# Patient Record
Sex: FEMALE | Race: BLACK OR AFRICAN AMERICAN | Employment: UNEMPLOYED | URBAN - METROPOLITAN AREA
[De-identification: names, ages, dates, MRNs, and addresses within clinical notes are randomized per-mention and may not be internally consistent; named-entity substitution may affect disease eponyms.]

---

## 2018-03-29 PROBLEM — Z82.49 FAMILY HISTORY OF ISCHEMIC HEART DISEASE (IHD): Status: ACTIVE | Noted: 2018-03-29

## 2018-03-29 PROBLEM — R07.2 PRECORDIAL PAIN: Status: ACTIVE | Noted: 2018-03-29

## 2018-03-29 PROBLEM — I10 ESSENTIAL HYPERTENSION: Status: ACTIVE | Noted: 2018-03-29

## 2018-05-17 ENCOUNTER — HOSPITAL ENCOUNTER (OUTPATIENT)
Dept: LAB | Age: 63
Discharge: HOME | End: 2018-05-17

## 2018-05-17 PROCEDURE — 88305 TISSUE EXAM BY PATHOLOGIST: CPT | Performed by: INTERNAL MEDICINE

## 2020-02-18 ENCOUNTER — ANESTHESIA EVENT (OUTPATIENT)
Dept: SURGERY | Age: 65
End: 2020-02-18
Payer: MEDICARE

## 2020-02-19 ENCOUNTER — APPOINTMENT (OUTPATIENT)
Dept: GENERAL RADIOLOGY | Age: 65
End: 2020-02-19
Attending: PODIATRIST
Payer: MEDICARE

## 2020-02-19 ENCOUNTER — ANESTHESIA (OUTPATIENT)
Dept: SURGERY | Age: 65
End: 2020-02-19
Payer: MEDICARE

## 2020-02-19 ENCOUNTER — HOSPITAL ENCOUNTER (OUTPATIENT)
Age: 65
Setting detail: OUTPATIENT SURGERY
Discharge: HOME | End: 2020-02-19
Attending: PODIATRIST | Admitting: PODIATRIST
Payer: MEDICARE

## 2020-02-19 VITALS
DIASTOLIC BLOOD PRESSURE: 88 MMHG | WEIGHT: 208 LBS | BODY MASS INDEX: 30.72 KG/M2 | HEART RATE: 66 BPM | RESPIRATION RATE: 17 BRPM | SYSTOLIC BLOOD PRESSURE: 180 MMHG | OXYGEN SATURATION: 98 % | TEMPERATURE: 97.6 F

## 2020-02-19 DIAGNOSIS — G89.18 POST-OPERATIVE PAIN: Primary | ICD-10-CM

## 2020-02-19 LAB
ALBUMIN SERPL-MCNC: 3.6 G/DL (ref 3.2–4.6)
ALBUMIN/GLOB SERPL: 1 {RATIO} (ref 1.2–3.5)
ALP SERPL-CCNC: 73 U/L (ref 50–136)
ALT SERPL-CCNC: 17 U/L (ref 12–65)
ANION GAP SERPL CALC-SCNC: 8 MMOL/L (ref 7–16)
AST SERPL-CCNC: 15 U/L (ref 15–37)
BILIRUB SERPL-MCNC: 0.3 MG/DL (ref 0.2–1.1)
BUN SERPL-MCNC: 21 MG/DL (ref 8–23)
CALCIUM SERPL-MCNC: 9.2 MG/DL (ref 8.3–10.4)
CHLORIDE SERPL-SCNC: 107 MMOL/L (ref 98–107)
CO2 SERPL-SCNC: 26 MMOL/L (ref 21–32)
CREAT SERPL-MCNC: 0.83 MG/DL (ref 0.6–1)
ERYTHROCYTE [DISTWIDTH] IN BLOOD BY AUTOMATED COUNT: 13.2 % (ref 11.9–14.6)
GLOBULIN SER CALC-MCNC: 3.6 G/DL (ref 2.3–3.5)
GLUCOSE SERPL-MCNC: 98 MG/DL (ref 65–100)
HCT VFR BLD AUTO: 35.1 % (ref 35.8–46.3)
HGB BLD-MCNC: 10.9 G/DL (ref 11.7–15.4)
MCH RBC QN AUTO: 27.2 PG (ref 26.1–32.9)
MCHC RBC AUTO-ENTMCNC: 31.1 G/DL (ref 31.4–35)
MCV RBC AUTO: 87.5 FL (ref 79.6–97.8)
NRBC # BLD: 0 K/UL (ref 0–0.2)
PLATELET # BLD AUTO: 223 K/UL (ref 150–450)
PMV BLD AUTO: 10.4 FL (ref 9.4–12.3)
POTASSIUM SERPL-SCNC: 3.7 MMOL/L (ref 3.5–5.1)
PROT SERPL-MCNC: 7.2 G/DL (ref 6.3–8.2)
RBC # BLD AUTO: 4.01 M/UL (ref 4.05–5.2)
SODIUM SERPL-SCNC: 141 MMOL/L (ref 136–145)
WBC # BLD AUTO: 8.2 K/UL (ref 4.3–11.1)

## 2020-02-19 PROCEDURE — 77030033138 HC SUT PGA STRATFX J&J -B: Performed by: PODIATRIST

## 2020-02-19 PROCEDURE — 77030006788 HC BLD SAW OSC STRY -B: Performed by: PODIATRIST

## 2020-02-19 PROCEDURE — 76010000138 HC OR TIME 0.5 TO 1 HR: Performed by: PODIATRIST

## 2020-02-19 PROCEDURE — C1776 JOINT DEVICE (IMPLANTABLE): HCPCS | Performed by: PODIATRIST

## 2020-02-19 PROCEDURE — 80053 COMPREHEN METABOLIC PANEL: CPT

## 2020-02-19 PROCEDURE — 77030032759 HC BIT DRL RMR DISP STRY -D: Performed by: PODIATRIST

## 2020-02-19 PROCEDURE — 74011000250 HC RX REV CODE- 250: Performed by: PODIATRIST

## 2020-02-19 PROCEDURE — 85027 COMPLETE CBC AUTOMATED: CPT

## 2020-02-19 PROCEDURE — 74011000250 HC RX REV CODE- 250: Performed by: NURSE ANESTHETIST, CERTIFIED REGISTERED

## 2020-02-19 PROCEDURE — 77030031139 HC SUT VCRL2 J&J -A: Performed by: PODIATRIST

## 2020-02-19 PROCEDURE — 77030018836 HC SOL IRR NACL ICUM -A: Performed by: PODIATRIST

## 2020-02-19 PROCEDURE — 76210000006 HC OR PH I REC 0.5 TO 1 HR: Performed by: PODIATRIST

## 2020-02-19 PROCEDURE — 74011250636 HC RX REV CODE- 250/636: Performed by: NURSE ANESTHETIST, CERTIFIED REGISTERED

## 2020-02-19 PROCEDURE — 74011250636 HC RX REV CODE- 250/636: Performed by: PODIATRIST

## 2020-02-19 PROCEDURE — 74011250636 HC RX REV CODE- 250/636: Performed by: ANESTHESIOLOGY

## 2020-02-19 PROCEDURE — 76060000033 HC ANESTHESIA 1 TO 1.5 HR: Performed by: PODIATRIST

## 2020-02-19 PROCEDURE — 76210000020 HC REC RM PH II FIRST 0.5 HR: Performed by: PODIATRIST

## 2020-02-19 DEVICE — INTRAMEDULLARY ARTHRODESIS IMPLANT
Type: IMPLANTABLE DEVICE | Site: TOE | Status: FUNCTIONAL
Brand: SMART TOE

## 2020-02-19 RX ORDER — MIDAZOLAM HYDROCHLORIDE 1 MG/ML
2 INJECTION, SOLUTION INTRAMUSCULAR; INTRAVENOUS
Status: DISCONTINUED | OUTPATIENT
Start: 2020-02-19 | End: 2020-02-19 | Stop reason: HOSPADM

## 2020-02-19 RX ORDER — LIDOCAINE HYDROCHLORIDE 10 MG/ML
INJECTION INFILTRATION; PERINEURAL AS NEEDED
Status: DISCONTINUED | OUTPATIENT
Start: 2020-02-19 | End: 2020-02-19 | Stop reason: HOSPADM

## 2020-02-19 RX ORDER — NALOXONE HYDROCHLORIDE 0.4 MG/ML
0.04 INJECTION, SOLUTION INTRAMUSCULAR; INTRAVENOUS; SUBCUTANEOUS
Status: DISCONTINUED | OUTPATIENT
Start: 2020-02-19 | End: 2020-02-19 | Stop reason: HOSPADM

## 2020-02-19 RX ORDER — PROPOFOL 10 MG/ML
INJECTION, EMULSION INTRAVENOUS
Status: DISCONTINUED | OUTPATIENT
Start: 2020-02-19 | End: 2020-02-19 | Stop reason: HOSPADM

## 2020-02-19 RX ORDER — LIDOCAINE HYDROCHLORIDE 10 MG/ML
0.1 INJECTION INFILTRATION; PERINEURAL AS NEEDED
Status: DISCONTINUED | OUTPATIENT
Start: 2020-02-19 | End: 2020-02-19 | Stop reason: HOSPADM

## 2020-02-19 RX ORDER — MIDAZOLAM HYDROCHLORIDE 1 MG/ML
2 INJECTION, SOLUTION INTRAMUSCULAR; INTRAVENOUS ONCE
Status: DISCONTINUED | OUTPATIENT
Start: 2020-02-19 | End: 2020-02-19 | Stop reason: HOSPADM

## 2020-02-19 RX ORDER — ACETAMINOPHEN AND CODEINE PHOSPHATE 300; 30 MG/1; MG/1
1 TABLET ORAL
Qty: 28 TAB | Refills: 0 | Status: SHIPPED | OUTPATIENT
Start: 2020-02-19 | End: 2020-02-22

## 2020-02-19 RX ORDER — OXYCODONE HYDROCHLORIDE 5 MG/1
5 TABLET ORAL
Status: DISCONTINUED | OUTPATIENT
Start: 2020-02-19 | End: 2020-02-19 | Stop reason: HOSPADM

## 2020-02-19 RX ORDER — FENTANYL CITRATE 50 UG/ML
100 INJECTION, SOLUTION INTRAMUSCULAR; INTRAVENOUS ONCE
Status: DISCONTINUED | OUTPATIENT
Start: 2020-02-19 | End: 2020-02-19 | Stop reason: HOSPADM

## 2020-02-19 RX ORDER — CEFAZOLIN SODIUM/WATER 2 G/20 ML
2 SYRINGE (ML) INTRAVENOUS ONCE
Status: COMPLETED | OUTPATIENT
Start: 2020-02-19 | End: 2020-02-19

## 2020-02-19 RX ORDER — SODIUM CHLORIDE, SODIUM LACTATE, POTASSIUM CHLORIDE, CALCIUM CHLORIDE 600; 310; 30; 20 MG/100ML; MG/100ML; MG/100ML; MG/100ML
100 INJECTION, SOLUTION INTRAVENOUS CONTINUOUS
Status: DISCONTINUED | OUTPATIENT
Start: 2020-02-19 | End: 2020-02-19 | Stop reason: HOSPADM

## 2020-02-19 RX ORDER — SODIUM CHLORIDE 0.9 % (FLUSH) 0.9 %
5-40 SYRINGE (ML) INJECTION AS NEEDED
Status: DISCONTINUED | OUTPATIENT
Start: 2020-02-19 | End: 2020-02-19 | Stop reason: HOSPADM

## 2020-02-19 RX ORDER — DEXAMETHASONE SODIUM PHOSPHATE 4 MG/ML
INJECTION, SOLUTION INTRA-ARTICULAR; INTRALESIONAL; INTRAMUSCULAR; INTRAVENOUS; SOFT TISSUE AS NEEDED
Status: DISCONTINUED | OUTPATIENT
Start: 2020-02-19 | End: 2020-02-19 | Stop reason: HOSPADM

## 2020-02-19 RX ORDER — HYDROMORPHONE HYDROCHLORIDE 2 MG/ML
0.5 INJECTION, SOLUTION INTRAMUSCULAR; INTRAVENOUS; SUBCUTANEOUS
Status: DISCONTINUED | OUTPATIENT
Start: 2020-02-19 | End: 2020-02-19 | Stop reason: HOSPADM

## 2020-02-19 RX ORDER — BUPIVACAINE HYDROCHLORIDE 5 MG/ML
INJECTION, SOLUTION EPIDURAL; INTRACAUDAL AS NEEDED
Status: DISCONTINUED | OUTPATIENT
Start: 2020-02-19 | End: 2020-02-19 | Stop reason: HOSPADM

## 2020-02-19 RX ORDER — SODIUM CHLORIDE 0.9 % (FLUSH) 0.9 %
5-40 SYRINGE (ML) INJECTION EVERY 8 HOURS
Status: DISCONTINUED | OUTPATIENT
Start: 2020-02-19 | End: 2020-02-19 | Stop reason: HOSPADM

## 2020-02-19 RX ORDER — CEPHALEXIN 500 MG/1
500 CAPSULE ORAL 2 TIMES DAILY
Qty: 20 CAP | Refills: 0 | Status: SHIPPED | OUTPATIENT
Start: 2020-02-19 | End: 2022-01-24

## 2020-02-19 RX ORDER — LIDOCAINE HYDROCHLORIDE 20 MG/ML
INJECTION, SOLUTION EPIDURAL; INFILTRATION; INTRACAUDAL; PERINEURAL AS NEEDED
Status: DISCONTINUED | OUTPATIENT
Start: 2020-02-19 | End: 2020-02-19 | Stop reason: HOSPADM

## 2020-02-19 RX ORDER — PROPOFOL 10 MG/ML
INJECTION, EMULSION INTRAVENOUS AS NEEDED
Status: DISCONTINUED | OUTPATIENT
Start: 2020-02-19 | End: 2020-02-19 | Stop reason: HOSPADM

## 2020-02-19 RX ADMIN — SODIUM CHLORIDE, SODIUM LACTATE, POTASSIUM CHLORIDE, AND CALCIUM CHLORIDE 100 ML/HR: 600; 310; 30; 20 INJECTION, SOLUTION INTRAVENOUS at 06:24

## 2020-02-19 RX ADMIN — PROPOFOL 40 MG: 10 INJECTION, EMULSION INTRAVENOUS at 07:13

## 2020-02-19 RX ADMIN — PROPOFOL 30 MG: 10 INJECTION, EMULSION INTRAVENOUS at 07:16

## 2020-02-19 RX ADMIN — PROPOFOL 200 MCG/KG/MIN: 10 INJECTION, EMULSION INTRAVENOUS at 07:13

## 2020-02-19 RX ADMIN — Medication 2 G: at 07:12

## 2020-02-19 RX ADMIN — LIDOCAINE HYDROCHLORIDE 60 MG: 20 INJECTION, SOLUTION EPIDURAL; INFILTRATION; INTRACAUDAL; PERINEURAL at 07:12

## 2020-02-19 NOTE — ANESTHESIA POSTPROCEDURE EVALUATION
Procedure(s):  LEFT 3RD TOE CORRECTION OF DEFORMED TOE . total IV anesthesia    Anesthesia Post Evaluation        Patient location during evaluation: PACU  Patient participation: complete - patient participated  Level of consciousness: awake  Pain management: satisfactory to patient  Airway patency: patent  Anesthetic complications: no  Cardiovascular status: hemodynamically stable  Respiratory status: spontaneous ventilation  Hydration status: euvolemic  Post anesthesia nausea and vomiting:  none      Vitals Value Taken Time   /81 2/19/2020  8:22 AM   Temp 36.4 °C (97.6 °F) 2/19/2020  8:09 AM   Pulse 65 2/19/2020  8:24 AM   Resp 15 2/19/2020  8:12 AM   SpO2 100 % 2/19/2020  8:24 AM   Vitals shown include unvalidated device data.

## 2020-02-19 NOTE — H&P
Patient: Yuliya Murray MRN: [id number]  SSN: [id number]    YOB: 1955  Age: 59 y.o. Sex: female      History and Physical    Yuliya Murray is a 59 y.o. female having Procedure(s):  LEFT 3RD TOE CORRECTION OF DEFORMED TOE LOCAL W/ MAC. Allergies: Allergies   Allergen Reactions    Latex Rash    Nexium [Esomeprazole Magnesium] Rash    Other Medication Rash     Silk tape    Seafood [Shellfish Containing Products] Rash        Chief Complaint: Left foot pain. History of Present Illness: One year history of left foot pain, difficulty walking and wearign shoes due to hammertoe. Past Medical History:   Diagnosis Date    Arthritis     pinched nerve in neck    GERD (gastroesophageal reflux disease)     well controlled with omeprazole daily--    Hypertension     Iron deficiency anemia     takes oral Fe- denies hx of blood transfusion      Past Surgical History:   Procedure Laterality Date    HX COLONOSCOPY      HX HYSTERECTOMY  1995    HX TONSILLECTOMY        Family History   Problem Relation Age of Onset    Coronary Artery Disease Mother     Arrhythmia Mother    Lucila Patel Mother     Other Mother 80        Gastroparesis    Stroke Mother         TIA    Diabetes Father     Kidney Disease Father     Breast Cancer Sister     Hypertension Sister     Drug Abuse Brother     Hypertension Brother       Social History     Tobacco Use    Smoking status: Never Smoker    Smokeless tobacco: Never Used   Substance Use Topics    Alcohol use: No        Prior to Admission medications    Medication Sig Start Date End Date Taking? Authorizing Provider   fluticasone propionate (FLONASE NA) by Nasal route daily as needed. Yes Provider, Historical   meloxicam (MOBIC) 15 mg tablet Take 15 mg by mouth daily.  2/18/2020 Instructed to hold for surgery as of now 12/2/19  Yes Provider, Historical   cyclobenzaprine (FLEXERIL) 10 mg tablet Take  by mouth three (3) times daily as needed for Muscle Spasm(s). Yes Provider, Historical   meclizine (ANTIVERT) 25 mg tablet Take 25 mg by mouth three (3) times daily as needed. Yes Provider, Historical   omeprazole (PRILOSEC) 40 mg capsule Take 40 mg by mouth daily. Yes Provider, Historical   multivitamin (ONE A DAY) tablet Take 1 Tab by mouth daily. Yes Provider, Historical   lisinopril-hydrochlorothiazide (ZESTORETIC) 20-12.5 mg per tablet Take 1 Tab by mouth daily. Yes Other, MD Aden   Calcium Carbonate-Vit D3-Min (CALTRATE 600+D PLUS MINERALS) 600-400 mg-unit Tab Take  by mouth. Yes Other, MD Aden   ferrous sulfate (IRON, FERROUS SULFATE,) 325 mg (65 mg Iron) tablet Take  by mouth daily (before breakfast). Yes Other, MD Aden   psyllium (METAMUCIL SMOOTH TEXTURE) 28 % packet Take  by mouth two (2) times daily as needed. Yes Other, MD Aden        Visit Vitals  Resp 16   Wt 94.3 kg (208 lb)   BMI 30.72 kg/m²        Assessment and Plan:   Mauricio Kovacs is a 59 y.o. female having Procedure(s):  LEFT 3RD TOE CORRECTION OF DEFORMED TOE LOCAL W/ MAC for left foot pain. Preanesthesia Evaluation     Last edited 02/19/20 0641 by Meg Bass MD  Date of Service 02/19/20 2573             Relevant Problems   No relevant active problems       Anesthetic History   No history of anesthetic complications            Review of Systems / Medical History  Pertinent labs reviewed    Pulmonary  Within defined limits                 Neuro/Psych   Within defined limits           Cardiovascular    Hypertension              Exercise tolerance: >4 METS  Comments: Normal cardiac w/u with echo and stress test 2018.    GI/Hepatic/Renal     GERD: well controlled           Endo/Other        Arthritis and anemia     Other Findings              Physical Exam    Airway  Mallampati: II  TM Distance: 4 - 6 cm  Neck ROM: normal range of motion   Mouth opening: Normal     Cardiovascular  Regular rate and rhythm,  S1 and S2 normal,  no murmur, click, rub, or gallop             Dental    Dentition: Upper partial plate and Lower partial plate     Pulmonary  Breath sounds clear to auscultation               Abdominal  GI exam deferred       Other Findings            Anesthetic Plan    ASA: 2  Anesthesia type: total IV anesthesia          Induction: Intravenous  Anesthetic plan and risks discussed with: Patient and Spouse               Preanesthesia evaluation performed by Liliane Triplett MD    Revision History       Date/Time User Provider Type Action    > 02/19/20 0641 Liliane Triplett MD Anesthesiologist Addend     02/19/20 8274 Liliane Triplett MD Anesthesiologist Sign                  Signed By: Marleny Sung MD     February 19, 2020

## 2020-02-19 NOTE — ANESTHESIA PREPROCEDURE EVALUATION
Relevant Problems   No relevant active problems       Anesthetic History   No history of anesthetic complications            Review of Systems / Medical History  Pertinent labs reviewed    Pulmonary  Within defined limits                 Neuro/Psych   Within defined limits           Cardiovascular    Hypertension              Exercise tolerance: >4 METS  Comments: Normal cardiac w/u with echo and stress test 2018.    GI/Hepatic/Renal     GERD: well controlled           Endo/Other        Arthritis and anemia     Other Findings              Physical Exam    Airway  Mallampati: II  TM Distance: 4 - 6 cm  Neck ROM: normal range of motion   Mouth opening: Normal     Cardiovascular  Regular rate and rhythm,  S1 and S2 normal,  no murmur, click, rub, or gallop             Dental    Dentition: Upper partial plate and Lower partial plate     Pulmonary  Breath sounds clear to auscultation               Abdominal  GI exam deferred       Other Findings            Anesthetic Plan    ASA: 2  Anesthesia type: total IV anesthesia          Induction: Intravenous  Anesthetic plan and risks discussed with: Patient and Spouse

## 2020-02-19 NOTE — PROGRESS NOTES
Pt instructed to take her BP medication that she held prior to surgery when she arrives home. Pt verbalized understanding.

## 2020-02-19 NOTE — BRIEF OP NOTE
BRIEF OPERATIVE NOTE    Date of Procedure: 2/19/2020   Preoperative Diagnosis: Hammer toe of left foot [M20.42]  Postoperative Diagnosis: Hammer toe of left foot [M20.42]    Procedure(s):  LEFT 3RD TOE CORRECTION OF DEFORMED TOE   Surgeon(s) and Role:     * Korin Hernandez DPM - Primary         Surgical Assistant: none    Surgical Staff:  Circ-1: Jose Alberto Calhoun RN  Scrub Tech-1: Miguel Huang  Event Time In Time Out   Incision Start 0217    Incision Close 0758      Anesthesia: MAC   Estimated Blood Loss: minimal  Specimens: * No specimens in log *   Findings: dictated  Complications: none  Implants:   Implant Name Type Inv.  Item Serial No.  Lot No. LRB No. Used Action   TOE IMPL II 15MM --  - [id number]  TOE IMPL II 15MM --   [employer] HOW [id number] Left 1 Implanted

## 2020-02-19 NOTE — DISCHARGE INSTRUCTIONS
[facility]    Post operative instructions and recommendations for your personal comfort following foot surgery:    1. Upon arrival home, lie down and elevate your feet and legs approximately 16 on pillows. Also, support your knees with pillows. 2. Post-operative swelling is greatly reduced by the use of ice packs. Ice packs should be applied over the top or bottom of the bandage every twenty minutes on the hour until returning to the office. 3. Take medication prescribed by the doctor according to directions. Avoid taking medication on an empty stomach. 4. Remain quiet and off of your feet as much as possible for the first 48 hours. After this period use discretion and common sense regarding the amount of standing or walking you do. Generally, post-operative patients should stay off their feet for 24-48hours. After this period walking to tolerance is usually allowed (unless otherwise specified by your doctor). 5. Do not be alarmed if there is some slight bleeding on the bandages; good blood supply is essential for normal tissue healing. 6. Keep feet elevated as much as possible for the first three days. Generally above the chest is most desirable. 7. Keep bandages completely dry. 8. Do not remove the surgical bandages. 9. The successful result of your surgery depends on the careful following of these instructions. 10. Please refrain from all alcoholic beverages. 11. If there are any questions or problems, please feel free to phone the doctor at the office. Your follow-up appointment has been scheduled for **, 2009 at the [place] office. Nicho Germain, [facility] [address] [place] [address] [phone]  [facility] [phone]    Dr. Orlin Martin  Cell [facility]--[phone]    ACTIVITY  · As tolerated and as directed by your doctor. · Bathe or shower as directed by your doctor.      DIET  · Clear liquids until no nausea or vomiting; then light diet for the first day. · Advance to regular diet on second day, unless your doctor orders otherwise. · If nausea and vomiting continues, call your doctor. PAIN  · Take pain medication as directed by your doctor. · Call your doctor if pain is NOT relieved by medication. · DO NOT take aspirin of blood thinners unless directed by your doctor. DRESSING CARE       CALL YOUR DOCTOR IF   · Excessive bleeding that does not stop after holding pressure over the area  · Temperature of 101 degrees F or above  · Excessive redness, swelling or bruising, and/ or green or yellow, smelly discharge from incision    AFTER ANESTHESIA   · For the first 24 hours: DO NOT Drive, Drink alcoholic beverages, or Make important decisions. · Be aware of dizziness following anesthesia and while taking pain medication. APPOINTMENT DATE/ TIME    YOUR DOCTOR'S PHONE NUMBER       DISCHARGE SUMMARY from Nurse    PATIENT INSTRUCTIONS:    After general anesthesia or intravenous sedation, for 24 hours or while taking prescription Narcotics:  · Limit your activities  · Do not drive and operate hazardous machinery  · Do not make important personal or business decisions  · Do  not drink alcoholic beverages  · If you have not urinated within 8 hours after discharge, please contact your surgeon on call. *  Please give a list of your current medications to your Primary Care Provider. *  Please update this list whenever your medications are discontinued, doses are      changed, or new medications (including over-the-counter products) are added. *  Please carry medication information at all times in case of emergency situations. These are general instructions for a healthy lifestyle:    No smoking/ No tobacco products/ Avoid exposure to second hand smoke    Surgeon General's Warning:  Quitting smoking now greatly reduces serious risk to your health.     Obesity, smoking, and sedentary lifestyle greatly increases your risk for illness    A healthy diet, regular physical exercise & weight monitoring are important for maintaining a healthy lifestyle    You may be retaining fluid if you have a history of heart failure or if you experience any of the following symptoms:  Weight gain of 3 pounds or more overnight or 5 pounds in a week, increased swelling in our hands or feet or shortness of breath while lying flat in bed. Please call your doctor as soon as you notice any of these symptoms; do not wait until your next office visit. Recognize signs and symptoms of STROKE:    F-face looks uneven    A-arms unable to move or move unevenly    S-speech slurred or non-existent    T-time-call 911 as soon as signs and symptoms begin-DO NOT go       Back to bed or wait to see if you get better-TIME IS BRAIN.

## 2020-02-20 NOTE — OP NOTES
[facility]  OPERATIVE REPORT    Name:  Maritza Mena  MR#:  [id number]  :  1955  ACCOUNT #:  [de-identified]  DATE OF SERVICE:  2020    PREOPERATIVE DIAGNOSIS:  Deformed third digit of the left foot. POSTOPERATIVE DIAGNOSIS:  Deformed third digit of the left foot. PROCEDURE PERFORMED:  Correction of the deformed third digit with arthrodesis. SURGEON:  Kary Banda DPM    ASSISTANT:  none    ANESTHESIA:  IV sedation with local infiltration. COMPLICATIONS:  none    SPECIMENS REMOVED:  none    IMPLANTS:  dictated    ESTIMATED BLOOD LOSS:  minimal    PROCEDURE IN DETAIL:  The patient was brought to the operating room and placed on operating table in supine position. After a brief general anesthesia, local anesthesia was achieved utilizing 1:1 mixture of 1% Xylocaine plain and 0.5% Marcaine plain, a total of 6 mL. At this time, the left foot was prepped and draped in the usual standard fashion. The foot was exsanguinated and tourniquet was elevated at the ankle level at 250 mmHg. Attention was then directed to the third digit, which was noted to have a significant deformity at the PIPJ. A dorsolinear incision of approximately 2-3 cm was placed centered at the PIPJ. The incision was carried down to the deeper layer via sharp and dull dissection taking care of neurovascular structures as encountered. At this time, a transverse capsulotomy was performed and the head of the proximal phalanx and base of the intermediate phalanx were exposed into the surgical site. Utilizing a rongeur, the entire cartilaginous surfaces were denuded. At this time, the both proximal phalanx and the intermediate phalanx were prepared for the size 15-mm Smart Toe Implant. The surgical site was irrigated. The implant was implanted. It should be noted that it was a straight. The intraoperative radiograph showed satisfactory alignment and satisfactory approximation.   At this time, the surgical site was once again irrigated. Deep closure was achieved utilizing 3-0 and 4-0 Vicryl and the skin was closed utilizing 4-0 Monocryl. At this time, the surgical area, just proximal to it, in the MPJ area, approximately 3 mL of 0.5% Marcaine mixed with 2 mg of dexamethasone was injected for prolonged anesthesia and to keep the inflammation at minimum. The wound was dressed with a Steri-Strip, Betadine-soaked Adaptic, plain 4x4s, Kerlix and Coban. As soon as the tourniquet was released, instant warming and pinking of the digits were noted indicating good return of the vascular supply. The patient tolerated the anesthesia and procedure well and left the operating room in apparent satisfactory condition. Postoperatively, she has been given written postoperative instructions to follow. She has been provided with numbers to call if any questions or concerns arise. I have given her prescriptions for Keflex 500 mg, #20 one p.o. b.i.d. and Tylenol 3, #28 one every six hours as needed for foot pain. She is to come and see me in approximately 5-7 days.       AJITH [id number]  D:  02/19/2020 8:08  T:  02/19/2020 19:37  JOB #:  [id number]

## 2022-01-24 ENCOUNTER — APPOINTMENT (OUTPATIENT)
Dept: CT IMAGING | Age: 67
End: 2022-01-24
Attending: EMERGENCY MEDICINE
Payer: MEDICARE

## 2022-01-24 ENCOUNTER — HOSPITAL ENCOUNTER (EMERGENCY)
Age: 67
Discharge: HOME | End: 2022-01-24
Attending: EMERGENCY MEDICINE
Payer: MEDICARE

## 2022-01-24 VITALS
WEIGHT: 207.89 LBS | DIASTOLIC BLOOD PRESSURE: 76 MMHG | TEMPERATURE: 98.5 F | SYSTOLIC BLOOD PRESSURE: 113 MMHG | RESPIRATION RATE: 20 BRPM | OXYGEN SATURATION: 97 % | HEART RATE: 74 BPM | BODY MASS INDEX: 30.79 KG/M2 | HEIGHT: 69 IN

## 2022-01-24 DIAGNOSIS — T14.8XXA MUSCLE STRAIN: Primary | ICD-10-CM

## 2022-01-24 PROCEDURE — 99283 EMERGENCY DEPT VISIT LOW MDM: CPT

## 2022-01-24 PROCEDURE — 70450 CT HEAD/BRAIN W/O DYE: CPT

## 2022-01-24 PROCEDURE — 72125 CT NECK SPINE W/O DYE: CPT

## 2022-01-24 RX ORDER — METHOCARBAMOL 750 MG/1
750 TABLET, FILM COATED ORAL 3 TIMES DAILY
Qty: 18 TABLET | Refills: 0 | Status: SHIPPED | OUTPATIENT
Start: 2022-01-24 | End: 2022-04-18 | Stop reason: SDUPTHER
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
## 2022-03-19 PROBLEM — R07.2 PRECORDIAL PAIN: Status: ACTIVE | Noted: 2018-03-29

## 2022-03-19 PROBLEM — I10 ESSENTIAL HYPERTENSION: Status: ACTIVE | Noted: 2018-03-29

## 2022-03-20 PROBLEM — Z82.49 FAMILY HISTORY OF ISCHEMIC HEART DISEASE (IHD): Status: ACTIVE | Noted: 2018-03-29

## 2022-04-04 ENCOUNTER — HOSPITAL ENCOUNTER (OUTPATIENT)
Dept: SURGERY | Age: 67
Discharge: HOME | End: 2022-04-04
Payer: MEDICARE

## 2022-04-04 ENCOUNTER — HOSPITAL ENCOUNTER (OUTPATIENT)
Dept: REHABILITATION | Age: 67
Discharge: HOME | End: 2022-04-04
Payer: MEDICARE

## 2022-04-04 VITALS
BODY MASS INDEX: 32.02 KG/M2 | DIASTOLIC BLOOD PRESSURE: 78 MMHG | HEIGHT: 67 IN | WEIGHT: 204 LBS | TEMPERATURE: 98 F | RESPIRATION RATE: 18 BRPM | SYSTOLIC BLOOD PRESSURE: 146 MMHG | HEART RATE: 59 BPM | OXYGEN SATURATION: 99 %

## 2022-04-04 DIAGNOSIS — R06.83 SNORING: Primary | ICD-10-CM

## 2022-04-04 LAB
ANION GAP SERPL CALC-SCNC: 5 MMOL/L (ref 7–16)
APTT PPP: 29.2 SEC (ref 24.1–35.1)
ATRIAL RATE: 59 BPM
BACTERIA SPEC CULT: NORMAL
BASOPHILS # BLD: 0 K/UL (ref 0–0.2)
BASOPHILS NFR BLD: 1 % (ref 0–2)
BUN SERPL-MCNC: 13 MG/DL (ref 8–23)
CALCIUM SERPL-MCNC: 9.3 MG/DL (ref 8.3–10.4)
CALCULATED P AXIS, ECG09: 42 DEGREES
CALCULATED R AXIS, ECG10: 57 DEGREES
CALCULATED T AXIS, ECG11: 41 DEGREES
CHLORIDE SERPL-SCNC: 105 MMOL/L (ref 98–107)
CO2 SERPL-SCNC: 28 MMOL/L (ref 21–32)
CREAT SERPL-MCNC: 0.9 MG/DL (ref 0.6–1)
DIAGNOSIS, 93000: NORMAL
DIFFERENTIAL METHOD BLD: ABNORMAL
EOSINOPHIL # BLD: 0.2 K/UL (ref 0–0.8)
EOSINOPHIL NFR BLD: 3 % (ref 0.5–7.8)
ERYTHROCYTE [DISTWIDTH] IN BLOOD BY AUTOMATED COUNT: 13.5 % (ref 11.9–14.6)
EST. AVERAGE GLUCOSE BLD GHB EST-MCNC: 105 MG/DL
GLUCOSE SERPL-MCNC: 92 MG/DL (ref 65–100)
HBA1C MFR BLD: 5.3 % (ref 4.2–6.3)
HCT VFR BLD AUTO: 36.9 % (ref 35.8–46.3)
HGB BLD-MCNC: 11.4 G/DL (ref 11.7–15.4)
IMM GRANULOCYTES # BLD AUTO: 0 K/UL (ref 0–0.5)
IMM GRANULOCYTES NFR BLD AUTO: 0 % (ref 0–5)
INR PPP: 1.1
LYMPHOCYTES # BLD: 2 K/UL (ref 0.5–4.6)
LYMPHOCYTES NFR BLD: 35 % (ref 13–44)
MCH RBC QN AUTO: 27.2 PG (ref 26.1–32.9)
MCHC RBC AUTO-ENTMCNC: 30.9 G/DL (ref 31.4–35)
MCV RBC AUTO: 88.1 FL (ref 79.6–97.8)
MONOCYTES # BLD: 0.4 K/UL (ref 0.1–1.3)
MONOCYTES NFR BLD: 8 % (ref 4–12)
NEUTS SEG # BLD: 3.1 K/UL (ref 1.7–8.2)
NEUTS SEG NFR BLD: 53 % (ref 43–78)
NRBC # BLD: 0 K/UL (ref 0–0.2)
P-R INTERVAL, ECG05: 170 MS
PLATELET # BLD AUTO: 229 K/UL (ref 150–450)
PMV BLD AUTO: 10.9 FL (ref 9.4–12.3)
POTASSIUM SERPL-SCNC: 3.4 MMOL/L (ref 3.5–5.1)
PROTHROMBIN TIME: 14.4 SEC (ref 12.6–14.5)
Q-T INTERVAL, ECG07: 408 MS
QRS DURATION, ECG06: 80 MS
QTC CALCULATION (BEZET), ECG08: 403 MS
RBC # BLD AUTO: 4.19 M/UL (ref 4.05–5.2)
SERVICE CMNT-IMP: NORMAL
SODIUM SERPL-SCNC: 138 MMOL/L (ref 136–145)
VENTRICULAR RATE, ECG03: 59 BPM
WBC # BLD AUTO: 5.7 K/UL (ref 4.3–11.1)

## 2022-04-04 PROCEDURE — 85730 THROMBOPLASTIN TIME PARTIAL: CPT

## 2022-04-04 PROCEDURE — 80048 BASIC METABOLIC PNL TOTAL CA: CPT

## 2022-04-04 PROCEDURE — 83036 HEMOGLOBIN GLYCOSYLATED A1C: CPT

## 2022-04-04 PROCEDURE — 94760 N-INVAS EAR/PLS OXIMETRY 1: CPT

## 2022-04-04 PROCEDURE — 85610 PROTHROMBIN TIME: CPT

## 2022-04-04 PROCEDURE — 77030027138 HC INCENT SPIROMETER -A

## 2022-04-04 PROCEDURE — 97161 PT EVAL LOW COMPLEX 20 MIN: CPT

## 2022-04-04 PROCEDURE — 85025 COMPLETE CBC W/AUTO DIFF WBC: CPT

## 2022-04-04 PROCEDURE — 87641 MR-STAPH DNA AMP PROBE: CPT

## 2022-04-04 PROCEDURE — 93005 ELECTROCARDIOGRAM TRACING: CPT

## 2022-04-04 RX ORDER — ASPIRIN 325 MG
325 TABLET ORAL DAILY
COMMUNITY
End: 2022-04-14

## 2022-04-04 RX ORDER — MAG/ALUMINUM/SOD BICARB/ALGINC 14.2-80MG
1 TABLET,CHEWABLE ORAL
COMMUNITY

## 2022-04-04 NOTE — PROGRESS NOTES
Roberto Barney  : [birth year](72 y.o.) [facility]  [address], [place]  [phone]       Physical Therapy Prehab Plan of Treatment and Evaluation Summary:2022    ICD-10: Treatment Diagnosis:   · Pain in Right Knee (M25.561)  · Stiffness of Right Knee, Not elsewhere classified (M25.661)  · Difficulty in walking, Not elsewhere classified (R26.2)  Precautions/Allergies:   Latex, Atorvastatin, Nexium [esomeprazole magnesium], Other medication, and Seafood [shellfish containing products]  MEDICAL/REFERRING DIAGNOSIS:  Unilateral primary osteoarthritis, right knee [M17.11]  REFERRING PHYSICIAN: Gurjit Perez MD  DATE OF SURGERY: 22    Assessment:   Comments:  Pt. Plans to go home with . She needs a left tka as well. PROBLEM LIST (Impacting functional limitations):  Ms. [address] presents with the following right lower extremity(s) problems:  1. Strength  2. Range of Motion  3. Home Exercise Program  4. Pain   INTERVENTIONS PLANNED:  1. Home Exercise Program  2. Educational Discussion      TREATMENT PLAN: Effective Dates: 2022 TO 2022. Frequency/Duration: Patient to continue to perform home exercise program at least twice per day up until her surgery. GOALS: (Goals have been discussed and agreed upon with patient.)  Discharge Goals: Time Frame: 1 Day  1. Patient will demonstrate independence with a home exercise program designed to increase strength, range of motion and pain control to minimize functional deficits and optimize patient for total joint replacement. Rehabilitation Potential For Stated Goals: Good  Regarding Richard Crockett's therapy, I certify that the treatment plan above will be carried out by a therapist or under their direction.   Thank you for this referral,  Cary Cuadra, PT               HISTORY:   Present Symptoms:  Pain Intensity 1:  (10 at worst)  Pain Location 1: Knee   History of Present Injury/Illness (Reason for Referral):  Medical/Referring Diagnosis: Unilateral primary osteoarthritis, right knee [M17.11]   Past Medical History/Comorbidities:   Ms. Mandi Hernandez  has a past medical history of Arthritis, GERD (gastroesophageal reflux disease), Hypertension, Iron deficiency anemia, Osteoarthritis, and Vertigo. Ms. Mandi Hernandez  has a past surgical history that includes hx hysterectomy (1995); hx tonsillectomy; hx colonoscopy; and hx orthopaedic (Left, 02/2020).   Social History/Living Environment:   Home Environment: Private residence  # Steps to Enter: 10  Rails to Enter: Yes  Hand Rails : Right  One/Two Story Residence: One story  Living Alone: No  Support Systems: Spouse/Significant Other  Patient Expects to be Discharged to[de-identified] Home  Current DME Used/Available at Home: None  Tub or Shower Type: Shower    Work/Activity:  disabled  Dominant Side:  BILATERAL  Current Medications:  See Pre-assessment nursing note   Number of Personal Factors/Comorbidities that affect the Plan of Care: 1-2: MODERATE COMPLEXITY   EXAMINATION:   ADLs (Current Functional Status):   Ambulation:  [x] Independent  [] Walk Indoors Only  [] Walk Outdoors  [] Use Assistive Device  [] Use Wheelchair Only Dressing:  [x] [address] from Someone for:  [] Sock/Shoes  [] Pants  [] Everything   Bathing/Showering:   [x] Independent  [] Requires Assistance from Someone  [] [address]:  [] Routine house and yard work  [x] Light Housework Only  [] None   Observation/Orthostatic Postural Assessment:   Exceptions to [employer] shoulders,Genu valgus right,Genu valgus left  ROM/Flexibility:   Gross Assessment: Yes  AROM: Generally decreased, functional (left  LE, left knee limited)                       RLE Assessment  RLE Assessment (WDL): Exceptions to WDL  RLE AROM  R Knee Flexion: 96  R Knee Extension: 9   Strength:   Gross Assessment: Yes  Strength: Generally decreased, functional (left LE)              RLE Strength  R Knee Flexion: 3  R Knee Extension: 3   Functional Mobility:    Gross Assessment: Yes    Gait Description (WDL): Exceptions to WDL  Stand to Sit: Additional time,Independent  Sit to Stand: Independent,Additional time  Distance (ft): 200 Feet (ft)  Ambulation - Level of Assistance: Independent  Speed/Katia: Delayed  Step Length: Right shortened;Left shortened  Stance: Right decreased  Gait Abnormalities: Antalgic          Balance:    Sitting: Intact  Standing: Intact   Body Structures Involved:  1. Bones  2. Joints  3. Muscles  4. Ligaments Body Functions Affected:  1. Movement Related Activities and Participation Affected:  1. Mobility   Number of elements that affect the Plan of Care: 3: MODERATE COMPLEXITY   CLINICAL PRESENTATION:   Presentation: Stable and uncomplicated: LOW COMPLEXITY   CLINICAL DECISION MAKING:   Tool Used: Knee injury and Osteoarthritis Outcome Score for Joint Replacement (KOOS, JR)  Score:  Initial: 16 (Interval: 47.487) 4/4/2022 Most Recent: TBD   Interpretation of Score: The KOOS, JR contains 7 items from the original KOOS survey. Items are coded from 0 to 4, none to extreme respectively. Vic Mosqueda is scored by summing the raw response (range 0-28) and then converting it to an interval score using the table provided below. The interval score ranges from 0 to 100 where 0 represents total knee disability and 100 represents perfect knee health. Medical Necessity:   · Ms. [address] is expected to optimize her lower extremity strength and ROM in preparation for joint replacement surgery. Reason for Services/Other Comments:  · Achieve baseline assesment of musculoskeletal system, functional mobility and home environment. , educate in PT HEP in preparation for surgery, educate in hospital plan of care.    Use of outcome tool(s) and clinical judgement create a POC that gives a: Clear prediction of patient's progress: LOW COMPLEXITY   TREATMENT:   Treatment/Session Assessment:  Patient was instructed in PT- HEP to increase strength and ROM in LEs. Answered all questions.   · Post session pain:  Knee pain  · Compliance with Program/Exercises: compliant  Total Treatment Duration:  PT Patient Time In/Time Out  Time In: 0800  Time Out: [address] [facility],

## 2022-04-04 NOTE — H&P (VIEW-ONLY)
[address] [facility]  Pre Operative History and Physical Exam    Patient ID:  Roberto Barney  [id number]  77 y.o.  1955    Today: April 4, 2022           CC: Right knee pain    HPI:   The patient has end stage arthritis of the right knee. The patient was evaluated and examined during a consultation prior to this office visit. There have been no changes to the patient's orthopedic condition since the initial consultation. The patient has failed previous conservative treatment for this condition including antiinflammatories , and lifestyle modifications. The necessity for joint replacement is present. The patient will be admitted the day of surgery for right knee replacement    Past Medical/Surgical History:  Past Medical History:   Diagnosis Date    Arthritis     pinched nerve in neck    GERD (gastroesophageal reflux disease)     well controlled with omeprazole daily--    Hypertension     managed with medication     Iron deficiency anemia     takes oral Fe- denies hx of blood transfusion    Osteoarthritis     Vertigo     Meclizine PRN      Past Surgical History:   Procedure Laterality Date    HX COLONOSCOPY      HX HYSTERECTOMY  1995    HX ORTHOPAEDIC Left 02/2020    LEFT 3RD TOE CORRECTION OF DEFORMED TOE     HX TONSILLECTOMY          Allergies:    Allergies   Allergen Reactions    Latex Rash    Atorvastatin Rash    Nexium [Esomeprazole Magnesium] Rash    Other Medication Rash     Silk tape    Seafood [Shellfish Containing Products] Rash        Physical Exam:   General: NAD, Alert, Oriented, Appears their stated age     [de-identified]: NC/AT    Skin: No rashes, lesions or wounds seen      Psych: normal affect      Heart: Regular Rate, Rhythm     Lungs: unlabored respirations, no wheezing    Abdomen: Soft and non-distended     Ortho: Pain with limited ROM of the right knee    Neuro: no focal defects, moving extremities equally    Lymph: no lymphadenopathy     Meds:   Current Outpatient Medications Medication Sig    aspirin (aspirin) 325 mg tablet Take 325 mg by mouth daily.  aluminum hydrox-magnesium carb (Gaviscon) 80-14.2 mg chew Take 1 Tablet by mouth four (4) times daily as needed for Indigestion.  methocarbamoL (ROBAXIN) 750 mg tablet Take 1 Tablet by mouth three (3) times daily.  meloxicam (MOBIC) 15 mg tablet Take 15 mg by mouth daily as needed.  meclizine (ANTIVERT) 25 mg tablet Take 25 mg by mouth three (3) times daily as needed.  omeprazole (PRILOSEC) 40 mg capsule Take 40 mg by mouth daily.  multivitamin (ONE A DAY) tablet Take 1 Tab by mouth daily.  lisinopril-hydrochlorothiazide (ZESTORETIC) 20-12.5 mg per tablet Take 1 Tab by mouth daily.  Calcium Carbonate-Vit D3-Min (CALTRATE 600+D PLUS MINERALS) 600-400 mg-unit Tab Take  by mouth.  ferrous sulfate (IRON, FERROUS SULFATE,) 325 mg (65 mg Iron) tablet Take  by mouth daily (before breakfast).  psyllium (METAMUCIL SMOOTH TEXTURE) 28 % packet Take  by mouth two (2) times daily as needed. No current facility-administered medications for this visit.          Labs:  Hospital Outpatient Visit on 04/04/2022   Component Date Value Ref Range Status    WBC 04/04/2022 5.7  4.3 - 11.1 K/uL Final    RBC 04/04/2022 4.19  4.05 - 5.2 M/uL Final    HGB 04/04/2022 11.4* 11.7 - 15.4 g/dL Final    HCT 04/04/2022 36.9  35.8 - 46.3 % Final    MCV 04/04/2022 88.1  79.6 - 97.8 FL Final    MCH 04/04/2022 27.2  26.1 - 32.9 PG Final    MCHC 04/04/2022 30.9* 31.4 - 35.0 g/dL Final    RDW 04/04/2022 13.5  11.9 - 14.6 % Final    PLATELET 50/74/7503 704  150 - 450 K/uL Final    MPV 04/04/2022 10.9  9.4 - 12.3 FL Final    ABSOLUTE NRBC 04/04/2022 0.00  0.0 - 0.2 K/uL Final    **Note: Absolute NRBC parameter is now reported with Hemogram**    NEUTROPHILS 04/04/2022 53  43 - 78 % Final    LYMPHOCYTES 04/04/2022 35  13 - 44 % Final    MONOCYTES 04/04/2022 8  4.0 - 12.0 % Final    EOSINOPHILS 04/04/2022 3  0.5 - 7.8 % Final    BASOPHILS 04/04/2022 1  0.0 - 2.0 % Final    IMMATURE GRANULOCYTES 04/04/2022 0  0.0 - 5.0 % Final    ABS. NEUTROPHILS 04/04/2022 3.1  1.7 - 8.2 K/UL Final    ABS. LYMPHOCYTES 04/04/2022 2.0  0.5 - 4.6 K/UL Final    ABS. MONOCYTES 04/04/2022 0.4  0.1 - 1.3 K/UL Final    ABS. EOSINOPHILS 04/04/2022 0.2  0.0 - 0.8 K/UL Final    ABS. BASOPHILS 04/04/2022 0.0  0.0 - 0.2 K/UL Final    ABS. IMM. GRANS. 04/04/2022 0.0  0.0 - 0.5 K/UL Final    DF 04/04/2022 AUTOMATED    Final    Ventricular Rate 04/04/2022 59  BPM Incomplete    Atrial Rate 04/04/2022 59  BPM Incomplete    P-R Interval 04/04/2022 170  ms Incomplete    QRS Duration 04/04/2022 80  ms Incomplete    Q-T Interval 04/04/2022 408  ms Incomplete    QTC Calculation (Bezet) 04/04/2022 403  ms Incomplete    Calculated P Axis 04/04/2022 42  degrees Incomplete    Calculated R Axis 04/04/2022 57  degrees Incomplete    Calculated T Axis 04/04/2022 41  degrees Incomplete    Diagnosis 04/04/2022    Incomplete                    Value:Sinus bradycardia  Increased R/S ratio in V1, consider early transition or posterior infarct  Abnormal ECG  When compared with ECG of 10-OCT-2016 17:18,  ST elevation now present in Anterior leads  Nonspecific T wave abnormality no longer evident in Anterior leads      Hemoglobin A1c 04/04/2022 5.3  4.20 - 6.30 % Final    Est. average glucose 04/04/2022 105  mg/dL Final    Comment: (NOTE)  The eAG should be interpreted with patient characteristics in mind   since ethnicity, interindividual differences, red cell lifespan,   variation in rates of glycation, etc. may affect the validity of the   calculation.       Sodium 04/04/2022 138  136 - 145 mmol/L Final    Potassium 04/04/2022 3.4* 3.5 - 5.1 mmol/L Final    Chloride 04/04/2022 105  98 - 107 mmol/L Final    CO2 04/04/2022 28  21 - 32 mmol/L Final    Anion gap 04/04/2022 5* 7 - 16 mmol/L Final    Glucose 04/04/2022 92  65 - 100 mg/dL Final    Comment: 47 - 60 mg/dl Consistent with, but not fully diagnostic of hypoglycemia. 101 - 125 mg/dl Impaired fasting glucose/consistent with pre-diabetes mellitus  > 126 mg/dl Fasting glucose consistent with overt diabetes mellitus      BUN 04/04/2022 13  8 - 23 MG/DL Final    Creatinine 04/04/2022 0.90  0.6 - 1.0 MG/DL Final    GFR est AA 04/04/2022 >60  >60 ml/min/1.73m2 Final    GFR est non-AA 04/04/2022 >60  >60 ml/min/1.73m2 Final    Comment: (NOTE)  Estimated GFR is calculated using the Modification of Diet in Renal   Disease (MDRD) Study equation, reported for both  Americans   (GFRAA) and non- Americans (GFRNA), and normalized to 1.73m2   body surface area. The physician must decide which value applies to   the patient. The MDRD study equation should only be used in   individuals age 25 or older. It has not been validated for the   following: pregnant women, patients with serious comorbid conditions,   or on certain medications, or persons with extremes of body size,   muscle mass, or nutritional status.  Calcium 04/04/2022 9.3  8.3 - 10.4 MG/DL Final    Prothrombin time 04/04/2022 14.4  12.6 - 14.5 sec Final    INR 04/04/2022 1.1    Final    Comment: Suggested therapeutic INR range:  Venous thrombosis and embolus  2.0-3.0  Prosthetic heart valve         2.5-3.5  ** Note new reference range and method **      aPTT 04/04/2022 29.2  24.1 - 35.1 SEC Final    Comment: Heparin Therapeutic Range = 74 - 123 seconds  In addition to factor deficiency, monitoring heparin therapy, etc., evaluation of a prolonged aPTT result should include consideration of preanalytic variables such as heparin flush contamination, specimen integrity issues, etc.                   Patient Active Problem List   Diagnosis Code    Precordial pain R07.2    Essential hypertension I10    Family history of ischemic heart disease (IHD) Z82.49         Assessment:   1. Arthritis of the right knee      Plan:    1.  Proceed with scheduled right knee replacement      The patient was counseled at length about the risks of jessica Covid-19 during their perioperative period and any recovery window from their procedure. The patient was made aware that jessica Covid-19  may worsen their prognosis for recovering from their procedure and lend to a higher morbidity and/or mortality risk. All material risks, benefits, and reasonable alternatives including postponing the procedure were discussed. The patient does wish to proceed with the procedure at this time.          Signed By: Saint Rubenstein, PA  April 4, 2022

## 2022-04-04 NOTE — PERIOP NOTES
PLEASE CONTINUE TAKING ALL PRESCRIPTION MEDICATIONS UP TO THE DAY OF SURGERY UNLESS OTHERWISE DIRECTED BELOW. DISCONTINUE all vitamins, herbals and supplements 21 days prior to surgery. DISCONTINUE Non-Steriodal Anti-Inflammatory (NSAIDS) such as Advil, Ibuprofen, and Aleve 5 days prior to surgery. Home Medications to HOLD      All vitamins, supplements, and herbals stop today. All NSAIDs such as Meloxicam,Advil, Aleve, Ibuprofen, Diclofenac, Naproxen, etc. Aspirin Stop 5 days prior to surgery. *IT IS OK TO TAKE TYLENOL*  *CONTINUE IRON SUPPLEMENT*     Home Medications to take  the day of surgery   Omeprazole, Meclizine if needed, Methocarbamol (Robaxin) if needed        Comments   *The day before surgery, 4/12/22, take Acetaminophen (Tylenol) 1000mg in the morning and again at bedtime*   BRING: Omeprazole, incentive spirometer           Please do not bring home medications with you on the day of surgery unless otherwise directed by your nurse. If you are instructed to bring home medications, please give them to your nurse as they will be administered by the nursing staff. If you have any questions, please call [facility] [phone] or [facility] [phone]. Copy of above instructions given to patient.

## 2022-04-04 NOTE — PERIOP NOTES
Patient verified name and . Order for consent found in EHR and matches case posting; patient verified. Type 3 surgery, joint assessment complete. Labs per surgeon: CBC,BMP, PT/PTT, HgbA1c; results pending. T&S DOS and POC glucose DOS; orders signed and held in EHR. Labs per anesthesia protocol: no additional  EKG: Completed today; results to be reviewed by anesthesia. Charge nurse to f/u. Echo (18) and Stress () in EHR if needed. MRSA/MSSA swab collected; pharmacy to review and dose antibiotic as appropriate. Hospital approved surgical skin cleanser and instructions to return bottle on DOS given per hospital policy. Patient provided with handouts including Guide to Surgery, Pain Management, Hand Hygiene, Blood Transfusion Education, and [facility] Brochure. Patient answered medical/surgical history questions at their best of ability. All prior to admission medications documented in [facility]. Original medication prescription bottle NOT visualized during patient appointment. Patient instructed to hold all vitamins, supplements, herbals 3 weeks prior to surgery and NSAIDS/ASA 5 days prior to surgery. Pt instructed to continue iron supplement up until the DOS. Patient teach back successful and patient demonstrates knowledge of instruction.

## 2022-04-04 NOTE — PROGRESS NOTES
04/04/22 0815   Oxygen Therapy   O2 Sat (%) 99 %   Pulse via Oximetry 67 beats per minute   O2 Device None (Room air)   Pre-Treatment   Breath Sounds Bilateral Clear   Pre FEV1 (liters) 2 liters   % Predicted 82   Pt's symptoms include:    Snoring  Tiredness- excessive daytime sleepiness  Bed at 11:30pm until 10:00 am  TALK IN SLEEP  Waking up from sleep gasping   GERD  Neck size        36      cm  Modified Daily stage 4  SACS Score 6  STOP BANG 4  Height   5   ' 9   \"   Weight  204   lbs  BMI 32.5    Sleepiness Scale:     Sitting and reading 2    Watching TV 2    Sitting inactive in a public place 0    As a passenger in a car for an hour without a break 2    Lying down to rest in the afternoon when circumstances Permits 3    Sitting and talking to someone 0    Sitting quietly after lunch without alcohol 2    In a car, while stopped for a few minutes 0    Total :  11      Refer patient for sleep study based on above assessment. [facility]  Phone number:  [phone]  Initial respiratory Assessment completed with pt. Pt was interviewed and evaluated in [facility] prior to surgery. Patient ID:  Abdulkadir Colon  [id number]  45 y.o.  1955  Surgeon: Dr. Lesia Aldrich  Date of Surgery: 4/13/2022  Procedure:  Total Right Knee Arthroplasty  Primary Care Physician: Sarahi Luz [phone]  Specialists:     Pt taught proper COUGH technique  DIAPHRAGMATIC BREATHING EXERCISE INSTRUCTIONS GIVEN    History of smoking:   DENIES                 Quit date:         Secondhand smoke:DENIES    Past procedures with Oxygen desaturation or delayed awakening:DENIES    Past Medical History:   Diagnosis Date    Arthritis     pinched nerve in neck    GERD (gastroesophageal reflux disease)     well controlled with omeprazole daily--    Hypertension     managed with medication     Iron deficiency anemia     takes oral Fe- denies hx of blood transfusion    Osteoarthritis     Vertigo     Meclizine PRN         Respiratory history:DENIES SOB                                                                  Respiratory meds:  DENIES    FAMILY PRESENT:             NO     PAST SLEEP STUDY:                        DENIES  HX OF OSCAR:                                           DENIES  OSCAR assessment:                                               GOES TO BED AT 11:30 PM AND SLEEPS UNTIL 10:00 AM  SLEEPS ON SIDE       &      BACK         &       STOMACH  PHYSICAL EXAM   Body mass index is 32.43 kg/m².    Visit Vitals  BP (!) 146/78 (BP 1 Location: Right upper arm, BP Patient Position: Sitting)   Pulse (!) 59   Temp 98 °F (36.7 °C)   Resp 18   Ht 5' 6.5\" (1.689 m)   Wt 92.5 kg (204 lb)   SpO2 (P) 99%   BMI 32.43 kg/m²     Neck circumference:   36   cm    Loud snoring:                                                 YES            Witnessed apnea or wakening gasping or choking:        DENIES        Awakens with headaches:                                               DENIES  Morning or daytime tiredness/ sleepiness:                               TIRED- NAPS 30 MIN TO 1 HOUR  Dry mouth or sore throat in morning:            YES                                              Daily stage:  4                                   SACS score:6  Stop Bang   STOP-BANG  Does the patient snore loudly (louder than talking or loud enough to be heard through closed doors)?: Yes  Does the patient often feel tired, fatigued, or sleepy during the daytime, even after a \"good\" night's sleep?: Yes  Has anyone ever observed the patient stop breathing during their sleep? : No  Does the patient have or are they being treated for high blood pressure?: Yes  Is the patient's BMI greater than 35?: No  Is your neck circumference greater than 17 inches (Male) or 16 inches (Female)?: No  Is the patient older than 48?: Yes  Is the patient male?: No  OSCAR Score: 4  Has the patient been referred to Sleep Medicine?: Yes  Has the patient previously been diagnosed with Obstructive Sleep Apnea?: No                            CS [facility]  RESPIRATORY ASSESSMENT Q SHIFT   O2 PRN    ALBUTEROL  NEBULIZER Q6 PRN WHEEZING                                                Referrals:  [facility]  Pt.  Phone Number:

## 2022-04-04 NOTE — PERIOP NOTES
The below lab results are within anesthesia limits. Results routed, via [address] [facility], to patient's PCP per surgeon's request.     Recent Results (from the past 12 hour(s))   CBC WITH AUTOMATED DIFF    Collection Time: 04/04/22  7:30 AM   Result Value Ref Range    WBC 5.7 4.3 - 11.1 K/uL    RBC 4.19 4.05 - 5.2 M/uL    HGB 11.4 (L) 11.7 - 15.4 g/dL    HCT 36.9 35.8 - 46.3 %    MCV 88.1 79.6 - 97.8 FL    MCH 27.2 26.1 - 32.9 PG    MCHC 30.9 (L) 31.4 - 35.0 g/dL    RDW 13.5 11.9 - 14.6 %    PLATELET 165 489 - 489 K/uL    MPV 10.9 9.4 - 12.3 FL    ABSOLUTE NRBC 0.00 0.0 - 0.2 K/uL    NEUTROPHILS 53 43 - 78 %    LYMPHOCYTES 35 13 - 44 %    MONOCYTES 8 4.0 - 12.0 %    EOSINOPHILS 3 0.5 - 7.8 %    BASOPHILS 1 0.0 - 2.0 %    IMMATURE GRANULOCYTES 0 0.0 - 5.0 %    ABS. NEUTROPHILS 3.1 1.7 - 8.2 K/UL    ABS. LYMPHOCYTES 2.0 0.5 - 4.6 K/UL    ABS. MONOCYTES 0.4 0.1 - 1.3 K/UL    ABS. EOSINOPHILS 0.2 0.0 - 0.8 K/UL    ABS. BASOPHILS 0.0 0.0 - 0.2 K/UL    ABS. IMM. GRANS. 0.0 0.0 - 0.5 K/UL    DF AUTOMATED     HEMOGLOBIN A1C WITH EAG    Collection Time: 04/04/22  7:30 AM   Result Value Ref Range    Hemoglobin A1c 5.3 4.20 - 6.30 %    Est. average glucose 830 mg/dL   METABOLIC PANEL, BASIC    Collection Time: 04/04/22  7:30 AM   Result Value Ref Range    Sodium 138 136 - 145 mmol/L    Potassium 3.4 (L) 3.5 - 5.1 mmol/L    Chloride 105 98 - 107 mmol/L    CO2 28 21 - 32 mmol/L    Anion gap 5 (L) 7 - 16 mmol/L    Glucose 92 65 - 100 mg/dL    BUN 13 8 - 23 MG/DL    Creatinine 0.90 0.6 - 1.0 MG/DL    GFR est AA >60 >60 ml/min/1.73m2    GFR est non-AA >60 >60 ml/min/1.73m2    Calcium 9.3 8.3 - 10.4 MG/DL   MSSA/MRSA SC BY PCR, NASAL SWAB    Collection Time: 04/04/22  7:30 AM    Specimen: Nasal swab   Result Value Ref Range    Special Requests: NO SPECIAL REQUESTS      Culture result:        SA target not detected. A MRSA NEGATIVE, SA NEGATIVE test result does not preclude MRSA or SA nasal colonization. PROTHROMBIN TIME + INR    Collection Time: 04/04/22  7:30 AM   Result Value Ref Range    Prothrombin time 14.4 12.6 - 14.5 sec    INR 1.1     PTT    Collection Time: 04/04/22  7:30 AM   Result Value Ref Range    aPTT 29.2 24.1 - 35.1 SEC   EKG, 12 LEAD, INITIAL    Collection Time: 04/04/22  7:45 AM   Result Value Ref Range    Ventricular Rate 59 BPM    Atrial Rate 59 BPM    P-R Interval 170 ms    QRS Duration 80 ms    Q-T Interval 408 ms    QTC Calculation (Bezet) 403 ms    Calculated P Axis 42 degrees    Calculated R Axis 57 degrees    Calculated T Axis 41 degrees    Diagnosis       Sinus bradycardia  Increased R/S ratio in V1, consider early transition or posterior infarct  Abnormal ECG  When compared with ECG of 10-OCT-2016 17:18,  ST elevation now present in Anterior leads  Nonspecific T wave abnormality no longer evident in Anterior leads

## 2022-04-05 PROBLEM — R06.83 SNORING: Status: ACTIVE | Noted: 2022-04-05

## 2022-04-05 NOTE — PERIOP NOTES
Dr. Onur Cormier reviewed chart - EKGs dated 04/04/22 & 03/29/18 and Stress Test 04/23/18. No order received. Ok to proceed.

## 2022-04-05 NOTE — ADVANCED PRACTICE NURSE
Total Joint Surgery Preoperative Chart Review      Patient ID:  Isiah Rhodes  [id number]  77 y.o.  1955  Surgeon: Dr. Dominique Ashford  Date of Surgery: 4/13/2022  Procedure: Total Right Knee Arthroplasty  Primary Care Physician: Bonifacio Jensen [phone]  Specialty Physician(s):      Subjective:   Isiah Rhodes is a 77 y.o. BLACK/ female who presents for preoperative evaluation for Total Right Knee arthroplasty. This is a preoperative chart review note based on data collected by the nurse at the surgical Pre-Assessment visit. Past Medical History:   Diagnosis Date    Arthritis     pinched nerve in neck    GERD (gastroesophageal reflux disease)     well controlled with omeprazole daily--    Hypertension     managed with medication     Iron deficiency anemia     takes oral Fe- denies hx of blood transfusion    Osteoarthritis     Vertigo     Meclizine PRN       Past Surgical History:   Procedure Laterality Date    HX COLONOSCOPY      HX HYSTERECTOMY  1995    HX ORTHOPAEDIC Left 02/2020    LEFT 3RD TOE CORRECTION OF DEFORMED TOE     HX TONSILLECTOMY       Family History   Problem Relation Age of Onset    Coronary Art Dis Mother     Arrhythmia Mother    Marian Ravalli Mother     Other Mother 80        Gastroparesis    Stroke Mother         TIA    Diabetes Father     Kidney Disease Father     Breast Cancer Sister     Hypertension Sister     Drug Abuse Brother     Hypertension Brother       Social History     Tobacco Use    Smoking status: Never Smoker    Smokeless tobacco: Never Used   Substance Use Topics    Alcohol use: No       Prior to Admission medications    Medication Sig Start Date End Date Taking? Authorizing Provider   aspirin (aspirin) 325 mg tablet Take 325 mg by mouth daily. Yes Provider, Historical   aluminum hydrox-magnesium carb (Gaviscon) 80-14.2 mg chew Take 1 Tablet by mouth four (4) times daily as needed for Indigestion.    Yes Provider, Historical methocarbamoL (ROBAXIN) 750 mg tablet Take 1 Tablet by mouth three (3) times daily. 1/24/22  Yes Sigrid Mcdonough MD   meloxicam (MOBIC) 15 mg tablet Take 15 mg by mouth daily as needed. 12/2/19  Yes Provider, Historical   meclizine (ANTIVERT) 25 mg tablet Take 25 mg by mouth three (3) times daily as needed. Yes Provider, Historical   omeprazole (PRILOSEC) 40 mg capsule Take 40 mg by mouth daily. Yes Provider, Historical   multivitamin (ONE A DAY) tablet Take 1 Tab by mouth daily. Yes Provider, Historical   lisinopril-hydrochlorothiazide (ZESTORETIC) 20-12.5 mg per tablet Take 1 Tab by mouth daily. Yes Other, MD Aden   Calcium Carbonate-Vit D3-Min (CALTRATE 600+D PLUS MINERALS) 600-400 mg-unit Tab Take  by mouth. Yes Other, MD Aden   ferrous sulfate (IRON, FERROUS SULFATE,) 325 mg (65 mg Iron) tablet Take  by mouth daily (before breakfast). Yes Other, MD Aden   psyllium (METAMUCIL SMOOTH TEXTURE) 28 % packet Take  by mouth two (2) times daily as needed. Yes Other, MD Aden     Allergies   Allergen Reactions    Latex Rash    Atorvastatin Rash    Nexium [Esomeprazole Magnesium] Rash    Other Medication Rash     Silk tape    Seafood [Shellfish Containing Products] Rash          Objective:     Physical Exam:   No data found.     ECG:    EKG Results     Procedure 720 Value Units Date/Time    EKG, 12 LEAD, INITIAL [[id number]] Collected: 04/04/22 0745    Order Status: Completed Updated: 04/04/22 1823     Ventricular Rate 59 BPM      Atrial Rate 59 BPM      P-R Interval 170 ms      QRS Duration 80 ms      Q-T Interval 408 ms      QTC Calculation (Bezet) 403 ms      Calculated P Axis 42 degrees      Calculated R Axis 57 degrees      Calculated T Axis 41 degrees      Diagnosis --     Sinus bradycardia  Increased R/S ratio in V1, consider early transition or posterior infarct  ST elevation, consider early repolarization  Abnormal ECG  When compared with ECG of 10-OCT-2016 17:18,  ST elevation now present in Anterior leads  Nonspecific T wave abnormality no longer evident in Anterior leads  Confirmed by ST DEENA VARELA MD (), KRISTI BYRON ([id number]) on 4/4/2022 6:22:54 PM            Data Review:   Labs:   Labs reviewed    Problem List:  )  Patient Active Problem List   Diagnosis Code    Precordial pain R07.2    Essential hypertension I10    Family history of ischemic heart disease (IHD) Z82.49    Snoring R06.83       Total Joint Surgery Pre-Assessment Recommendations:           Patient reports the symptoms of snoring, fatigue, observed apnea and /or excessive daytime sleepiness. Will refer patient for HST based on above assessment. Recommend continuous saturation monitoring hours of sleep, during hospitalization.         Signed By: ROSANNE Garces    April 5, 2022

## 2022-04-12 ENCOUNTER — ANESTHESIA EVENT (OUTPATIENT)
Dept: SURGERY | Age: 67
End: 2022-04-12
Payer: MEDICARE

## 2022-04-13 ENCOUNTER — HOSPITAL ENCOUNTER (OUTPATIENT)
Age: 67
Discharge: HOME HEALTH | End: 2022-04-14
Attending: ORTHOPAEDIC SURGERY | Admitting: ORTHOPAEDIC SURGERY
Payer: MEDICARE

## 2022-04-13 ENCOUNTER — ANESTHESIA (OUTPATIENT)
Dept: SURGERY | Age: 67
End: 2022-04-13
Payer: MEDICARE

## 2022-04-13 ENCOUNTER — HOME HEALTH ADMISSION (OUTPATIENT)
Dept: HOME HEALTH SERVICES | Facility: HOME HEALTH | Age: 67
End: 2022-04-13
Payer: MEDICARE

## 2022-04-13 DIAGNOSIS — Z96.651 STATUS POST TOTAL RIGHT KNEE REPLACEMENT: Primary | ICD-10-CM

## 2022-04-13 PROBLEM — M17.11 OSTEOARTHRITIS OF RIGHT KNEE: Status: ACTIVE | Noted: 2022-04-13

## 2022-04-13 LAB — HGB BLD-MCNC: 10.6 G/DL (ref 11.7–15.4)

## 2022-04-13 PROCEDURE — 74011250636 HC RX REV CODE- 250/636: Performed by: ANESTHESIOLOGY

## 2022-04-13 PROCEDURE — 77030040922 HC BLNKT HYPOTHRM STRY -A: Performed by: ANESTHESIOLOGY

## 2022-04-13 PROCEDURE — C1776 JOINT DEVICE (IMPLANTABLE): HCPCS | Performed by: ORTHOPAEDIC SURGERY

## 2022-04-13 PROCEDURE — 74011250637 HC RX REV CODE- 250/637: Performed by: ANESTHESIOLOGY

## 2022-04-13 PROCEDURE — 77030003028 HC SUT VCRL J&J -A: Performed by: ORTHOPAEDIC SURGERY

## 2022-04-13 PROCEDURE — 2709999900 HC NON-CHARGEABLE SUPPLY: Performed by: ORTHOPAEDIC SURGERY

## 2022-04-13 PROCEDURE — 74011250637 HC RX REV CODE- 250/637: Performed by: PHYSICIAN ASSISTANT

## 2022-04-13 PROCEDURE — 76010010054 HC POST OP PAIN BLOCK: Performed by: ORTHOPAEDIC SURGERY

## 2022-04-13 PROCEDURE — 74011000258 HC RX REV CODE- 258: Performed by: ORTHOPAEDIC SURGERY

## 2022-04-13 PROCEDURE — 97530 THERAPEUTIC ACTIVITIES: CPT

## 2022-04-13 PROCEDURE — 97165 OT EVAL LOW COMPLEX 30 MIN: CPT

## 2022-04-13 PROCEDURE — 94762 N-INVAS EAR/PLS OXIMTRY CONT: CPT

## 2022-04-13 PROCEDURE — 77030020044 HC CLD THERAPY UNIT -B

## 2022-04-13 PROCEDURE — 77030031139 HC SUT VCRL2 J&J -A: Performed by: ORTHOPAEDIC SURGERY

## 2022-04-13 PROCEDURE — 74011250636 HC RX REV CODE- 250/636: Performed by: PHYSICIAN ASSISTANT

## 2022-04-13 PROCEDURE — 77030029828 HC FEM TIB CKPNT KT DISP STRY -B: Performed by: ORTHOPAEDIC SURGERY

## 2022-04-13 PROCEDURE — 74011000250 HC RX REV CODE- 250: Performed by: PHYSICIAN ASSISTANT

## 2022-04-13 PROCEDURE — 85018 HEMOGLOBIN: CPT

## 2022-04-13 PROCEDURE — 77030003665 HC NDL SPN BBMI -A: Performed by: ANESTHESIOLOGY

## 2022-04-13 PROCEDURE — 76210000006 HC OR PH I REC 0.5 TO 1 HR: Performed by: ORTHOPAEDIC SURGERY

## 2022-04-13 PROCEDURE — 76942 ECHO GUIDE FOR BIOPSY: CPT | Performed by: ORTHOPAEDIC SURGERY

## 2022-04-13 PROCEDURE — 77030038149 HC BLD SAW SAG STRY -D: Performed by: ORTHOPAEDIC SURGERY

## 2022-04-13 PROCEDURE — 20985 CPTR-ASST DIR MS PX: CPT | Performed by: ORTHOPAEDIC SURGERY

## 2022-04-13 PROCEDURE — 77030007880 HC KT SPN EPDRL BBMI -B: Performed by: ANESTHESIOLOGY

## 2022-04-13 PROCEDURE — 77030039760: Performed by: ORTHOPAEDIC SURGERY

## 2022-04-13 PROCEDURE — C1713 ANCHOR/SCREW BN/BN,TIS/BN: HCPCS | Performed by: ORTHOPAEDIC SURGERY

## 2022-04-13 PROCEDURE — 77030029820: Performed by: ORTHOPAEDIC SURGERY

## 2022-04-13 PROCEDURE — 27447 TOTAL KNEE ARTHROPLASTY: CPT | Performed by: ORTHOPAEDIC SURGERY

## 2022-04-13 PROCEDURE — 97161 PT EVAL LOW COMPLEX 20 MIN: CPT

## 2022-04-13 PROCEDURE — 94760 N-INVAS EAR/PLS OXIMETRY 1: CPT

## 2022-04-13 PROCEDURE — 97535 SELF CARE MNGMENT TRAINING: CPT

## 2022-04-13 PROCEDURE — 2709999900 HC NON-CHARGEABLE SUPPLY

## 2022-04-13 PROCEDURE — 77030006720 HC BLD PAT RMR ZIMM -B: Performed by: ORTHOPAEDIC SURGERY

## 2022-04-13 PROCEDURE — 74011000250 HC RX REV CODE- 250: Performed by: NURSE ANESTHETIST, CERTIFIED REGISTERED

## 2022-04-13 PROCEDURE — 74011250636 HC RX REV CODE- 250/636: Performed by: ORTHOPAEDIC SURGERY

## 2022-04-13 PROCEDURE — 77030012935 HC DRSG AQUACEL BMS -B: Performed by: ORTHOPAEDIC SURGERY

## 2022-04-13 PROCEDURE — 77030002912 HC SUT ETHBND J&J -A: Performed by: ORTHOPAEDIC SURGERY

## 2022-04-13 PROCEDURE — 77030019557 HC ELECTRD VES SEAL MEDT -F: Performed by: ORTHOPAEDIC SURGERY

## 2022-04-13 PROCEDURE — 74011000250 HC RX REV CODE- 250: Performed by: ORTHOPAEDIC SURGERY

## 2022-04-13 PROCEDURE — 77030003602 HC NDL NRV BLK BBMI -B: Performed by: ANESTHESIOLOGY

## 2022-04-13 PROCEDURE — 77030038692 HC WND DEB SYS IRMX -B: Performed by: ORTHOPAEDIC SURGERY

## 2022-04-13 PROCEDURE — 27447 TOTAL KNEE ARTHROPLASTY: CPT | Performed by: PHYSICIAN ASSISTANT

## 2022-04-13 PROCEDURE — 36415 COLL VENOUS BLD VENIPUNCTURE: CPT

## 2022-04-13 PROCEDURE — 76010000171 HC OR TIME 2 TO 2.5 HR INTENSV-TIER 1: Performed by: ORTHOPAEDIC SURGERY

## 2022-04-13 PROCEDURE — 74011250636 HC RX REV CODE- 250/636: Performed by: NURSE ANESTHETIST, CERTIFIED REGISTERED

## 2022-04-13 PROCEDURE — 76060000035 HC ANESTHESIA 2 TO 2.5 HR: Performed by: ORTHOPAEDIC SURGERY

## 2022-04-13 PROCEDURE — 77030018836 HC SOL IRR NACL ICUM -A: Performed by: ORTHOPAEDIC SURGERY

## 2022-04-13 DEVICE — DEPUY CMW 2 FAST SET BONE CEMENT 20G: Type: IMPLANTABLE DEVICE | Site: KNEE | Status: FUNCTIONAL

## 2022-04-13 DEVICE — KNEE K2 TOT HEMI ADV CMTLS -- IMPL CAPPED K2: Type: IMPLANTABLE DEVICE | Status: FUNCTIONAL

## 2022-04-13 DEVICE — PATELLA
Type: IMPLANTABLE DEVICE | Site: KNEE | Status: FUNCTIONAL
Brand: TRIATHLON

## 2022-04-13 DEVICE — CRUCIATE RETAINING FEMORAL
Type: IMPLANTABLE DEVICE | Site: KNEE | Status: FUNCTIONAL
Brand: TRIATHLON

## 2022-04-13 DEVICE — TIBIAL COMPONENT
Type: IMPLANTABLE DEVICE | Site: KNEE | Status: FUNCTIONAL
Brand: TRIATHLON

## 2022-04-13 DEVICE — TIBIAL BEARING INSERT
Type: IMPLANTABLE DEVICE | Site: KNEE | Status: FUNCTIONAL
Brand: TRIATHLON

## 2022-04-13 RX ORDER — LISINOPRIL AND HYDROCHLOROTHIAZIDE 12.5; 2 MG/1; MG/1
1 TABLET ORAL DAILY
Status: DISCONTINUED | OUTPATIENT
Start: 2022-04-14 | End: 2022-04-14 | Stop reason: HOSPADM

## 2022-04-13 RX ORDER — METHOCARBAMOL 750 MG/1
750 TABLET, FILM COATED ORAL 3 TIMES DAILY
Status: DISCONTINUED | OUTPATIENT
Start: 2022-04-13 | End: 2022-04-14 | Stop reason: HOSPADM

## 2022-04-13 RX ORDER — CEFAZOLIN SODIUM/WATER 2 G/20 ML
2 SYRINGE (ML) INTRAVENOUS ONCE
Status: COMPLETED | OUTPATIENT
Start: 2022-04-13 | End: 2022-04-13

## 2022-04-13 RX ORDER — ACETAMINOPHEN 325 MG/1
975 TABLET ORAL ONCE
Status: DISCONTINUED | OUTPATIENT
Start: 2022-04-13 | End: 2022-04-13

## 2022-04-13 RX ORDER — OXYCODONE HYDROCHLORIDE 5 MG/1
5 TABLET ORAL
Status: DISCONTINUED | OUTPATIENT
Start: 2022-04-13 | End: 2022-04-13 | Stop reason: HOSPADM

## 2022-04-13 RX ORDER — MIDAZOLAM HYDROCHLORIDE 1 MG/ML
INJECTION, SOLUTION INTRAMUSCULAR; INTRAVENOUS
Status: ACTIVE
Start: 2022-04-13 | End: 2022-04-13

## 2022-04-13 RX ORDER — DEXAMETHASONE SODIUM PHOSPHATE 4 MG/ML
INJECTION, SOLUTION INTRA-ARTICULAR; INTRALESIONAL; INTRAMUSCULAR; INTRAVENOUS; SOFT TISSUE
Status: COMPLETED | OUTPATIENT
Start: 2022-04-13 | End: 2022-04-13

## 2022-04-13 RX ORDER — PROMETHAZINE HYDROCHLORIDE 12.5 MG/1
12.5 TABLET ORAL
Qty: 30 TABLET | Refills: 0 | Status: SHIPPED | OUTPATIENT
Start: 2022-04-13

## 2022-04-13 RX ORDER — OXYCODONE HYDROCHLORIDE 5 MG/1
5-10 TABLET ORAL
Qty: 60 TABLET | Refills: 0 | Status: SHIPPED | OUTPATIENT
Start: 2022-04-13 | End: 2022-04-18

## 2022-04-13 RX ORDER — MIDAZOLAM HYDROCHLORIDE 1 MG/ML
INJECTION, SOLUTION INTRAMUSCULAR; INTRAVENOUS AS NEEDED
Status: DISCONTINUED | OUTPATIENT
Start: 2022-04-13 | End: 2022-04-13 | Stop reason: HOSPADM

## 2022-04-13 RX ORDER — DEXAMETHASONE SODIUM PHOSPHATE 100 MG/10ML
INJECTION INTRAMUSCULAR; INTRAVENOUS AS NEEDED
Status: DISCONTINUED | OUTPATIENT
Start: 2022-04-13 | End: 2022-04-13 | Stop reason: HOSPADM

## 2022-04-13 RX ORDER — LANOLIN ALCOHOL/MO/W.PET/CERES
1 CREAM (GRAM) TOPICAL
Status: DISCONTINUED | OUTPATIENT
Start: 2022-04-14 | End: 2022-04-14 | Stop reason: HOSPADM

## 2022-04-13 RX ORDER — NALOXONE HYDROCHLORIDE 0.4 MG/ML
.2-.4 INJECTION, SOLUTION INTRAMUSCULAR; INTRAVENOUS; SUBCUTANEOUS
Status: DISCONTINUED | OUTPATIENT
Start: 2022-04-13 | End: 2022-04-14 | Stop reason: HOSPADM

## 2022-04-13 RX ORDER — OXYCODONE HYDROCHLORIDE 5 MG/1
10 TABLET ORAL
Status: DISCONTINUED | OUTPATIENT
Start: 2022-04-13 | End: 2022-04-14 | Stop reason: HOSPADM

## 2022-04-13 RX ORDER — ASPIRIN 81 MG/1
81 TABLET ORAL EVERY 12 HOURS
Qty: 70 TABLET | Refills: 0 | Status: SHIPPED | OUTPATIENT
Start: 2022-04-13 | End: 2022-05-18

## 2022-04-13 RX ORDER — NALOXONE HYDROCHLORIDE 0.4 MG/ML
0.04 INJECTION, SOLUTION INTRAMUSCULAR; INTRAVENOUS; SUBCUTANEOUS
Status: DISCONTINUED | OUTPATIENT
Start: 2022-04-13 | End: 2022-04-13 | Stop reason: HOSPADM

## 2022-04-13 RX ORDER — ASPIRIN 81 MG/1
81 TABLET ORAL EVERY 12 HOURS
Status: DISCONTINUED | OUTPATIENT
Start: 2022-04-13 | End: 2022-04-14 | Stop reason: HOSPADM

## 2022-04-13 RX ORDER — TRANEXAMIC ACID 100 MG/ML
INJECTION, SOLUTION INTRAVENOUS AS NEEDED
Status: DISCONTINUED | OUTPATIENT
Start: 2022-04-13 | End: 2022-04-13 | Stop reason: HOSPADM

## 2022-04-13 RX ORDER — FENTANYL CITRATE 50 UG/ML
100 INJECTION, SOLUTION INTRAMUSCULAR; INTRAVENOUS ONCE
Status: COMPLETED | OUTPATIENT
Start: 2022-04-13 | End: 2022-04-13

## 2022-04-13 RX ORDER — PANTOPRAZOLE SODIUM 40 MG/1
40 TABLET, DELAYED RELEASE ORAL
Status: DISCONTINUED | OUTPATIENT
Start: 2022-04-14 | End: 2022-04-14 | Stop reason: HOSPADM

## 2022-04-13 RX ORDER — SODIUM CHLORIDE 9 MG/ML
100 INJECTION, SOLUTION INTRAVENOUS CONTINUOUS
Status: DISCONTINUED | OUTPATIENT
Start: 2022-04-13 | End: 2022-04-14 | Stop reason: HOSPADM

## 2022-04-13 RX ORDER — LIDOCAINE HYDROCHLORIDE 20 MG/ML
INJECTION, SOLUTION EPIDURAL; INFILTRATION; INTRACAUDAL; PERINEURAL AS NEEDED
Status: DISCONTINUED | OUTPATIENT
Start: 2022-04-13 | End: 2022-04-13 | Stop reason: HOSPADM

## 2022-04-13 RX ORDER — PROPOFOL 10 MG/ML
INJECTION, EMULSION INTRAVENOUS AS NEEDED
Status: DISCONTINUED | OUTPATIENT
Start: 2022-04-13 | End: 2022-04-13 | Stop reason: HOSPADM

## 2022-04-13 RX ORDER — CELECOXIB 200 MG/1
200 CAPSULE ORAL EVERY 12 HOURS
Status: DISCONTINUED | OUTPATIENT
Start: 2022-04-13 | End: 2022-04-14 | Stop reason: HOSPADM

## 2022-04-13 RX ORDER — CELECOXIB 200 MG/1
CAPSULE ORAL
Status: ACTIVE
Start: 2022-04-13 | End: 2022-04-13

## 2022-04-13 RX ORDER — ONDANSETRON 4 MG/1
4 TABLET, ORALLY DISINTEGRATING ORAL
Status: DISCONTINUED | OUTPATIENT
Start: 2022-04-13 | End: 2022-04-14 | Stop reason: HOSPADM

## 2022-04-13 RX ORDER — ACETAMINOPHEN 650 MG/1
650 SUPPOSITORY RECTAL ONCE
Status: DISCONTINUED | OUTPATIENT
Start: 2022-04-13 | End: 2022-04-13

## 2022-04-13 RX ORDER — SODIUM CHLORIDE 0.9 % (FLUSH) 0.9 %
5-40 SYRINGE (ML) INJECTION AS NEEDED
Status: DISCONTINUED | OUTPATIENT
Start: 2022-04-13 | End: 2022-04-14 | Stop reason: HOSPADM

## 2022-04-13 RX ORDER — MIDAZOLAM HYDROCHLORIDE 1 MG/ML
2 INJECTION, SOLUTION INTRAMUSCULAR; INTRAVENOUS
Status: DISCONTINUED | OUTPATIENT
Start: 2022-04-13 | End: 2022-04-13 | Stop reason: HOSPADM

## 2022-04-13 RX ORDER — LIDOCAINE HYDROCHLORIDE 10 MG/ML
0.1 INJECTION INFILTRATION; PERINEURAL AS NEEDED
Status: DISCONTINUED | OUTPATIENT
Start: 2022-04-13 | End: 2022-04-13 | Stop reason: HOSPADM

## 2022-04-13 RX ORDER — CEFAZOLIN SODIUM/WATER 2 G/20 ML
2 SYRINGE (ML) INTRAVENOUS EVERY 8 HOURS
Status: COMPLETED | OUTPATIENT
Start: 2022-04-13 | End: 2022-04-13

## 2022-04-13 RX ORDER — GLYCOPYRROLATE 0.2 MG/ML
INJECTION INTRAMUSCULAR; INTRAVENOUS AS NEEDED
Status: DISCONTINUED | OUTPATIENT
Start: 2022-04-13 | End: 2022-04-13 | Stop reason: HOSPADM

## 2022-04-13 RX ORDER — FENTANYL CITRATE 50 UG/ML
INJECTION, SOLUTION INTRAMUSCULAR; INTRAVENOUS
Status: ACTIVE
Start: 2022-04-13 | End: 2022-04-13

## 2022-04-13 RX ORDER — AMOXICILLIN 250 MG
2 CAPSULE ORAL DAILY
Status: DISCONTINUED | OUTPATIENT
Start: 2022-04-14 | End: 2022-04-14 | Stop reason: HOSPADM

## 2022-04-13 RX ORDER — KETOROLAC TROMETHAMINE 30 MG/ML
INJECTION, SOLUTION INTRAMUSCULAR; INTRAVENOUS AS NEEDED
Status: DISCONTINUED | OUTPATIENT
Start: 2022-04-13 | End: 2022-04-13 | Stop reason: HOSPADM

## 2022-04-13 RX ORDER — ACETAMINOPHEN 500 MG
1000 TABLET ORAL EVERY 6 HOURS
Status: DISCONTINUED | OUTPATIENT
Start: 2022-04-13 | End: 2022-04-14 | Stop reason: HOSPADM

## 2022-04-13 RX ORDER — DEXAMETHASONE SODIUM PHOSPHATE 100 MG/10ML
10 INJECTION INTRAMUSCULAR; INTRAVENOUS ONCE
Status: DISCONTINUED | OUTPATIENT
Start: 2022-04-14 | End: 2022-04-14 | Stop reason: HOSPADM

## 2022-04-13 RX ORDER — MECLIZINE HYDROCHLORIDE 25 MG/1
25 TABLET ORAL
Status: DISCONTINUED | OUTPATIENT
Start: 2022-04-13 | End: 2022-04-13

## 2022-04-13 RX ORDER — ROPIVACAINE HYDROCHLORIDE 2 MG/ML
INJECTION, SOLUTION EPIDURAL; INFILTRATION; PERINEURAL AS NEEDED
Status: DISCONTINUED | OUTPATIENT
Start: 2022-04-13 | End: 2022-04-13 | Stop reason: HOSPADM

## 2022-04-13 RX ORDER — HYDROMORPHONE HYDROCHLORIDE 1 MG/ML
1 INJECTION, SOLUTION INTRAMUSCULAR; INTRAVENOUS; SUBCUTANEOUS
Status: DISCONTINUED | OUTPATIENT
Start: 2022-04-13 | End: 2022-04-14 | Stop reason: HOSPADM

## 2022-04-13 RX ORDER — CEFAZOLIN SODIUM/WATER 2 G/20 ML
SYRINGE (ML) INTRAVENOUS
Status: ACTIVE
Start: 2022-04-13 | End: 2022-04-13

## 2022-04-13 RX ORDER — SODIUM CHLORIDE 0.9 % (FLUSH) 0.9 %
5-40 SYRINGE (ML) INJECTION EVERY 8 HOURS
Status: DISCONTINUED | OUTPATIENT
Start: 2022-04-13 | End: 2022-04-14 | Stop reason: HOSPADM

## 2022-04-13 RX ORDER — ACETAMINOPHEN 500 MG
1000 TABLET ORAL ONCE
Status: DISCONTINUED | OUTPATIENT
Start: 2022-04-13 | End: 2022-04-13 | Stop reason: HOSPADM

## 2022-04-13 RX ORDER — ONDANSETRON 2 MG/ML
INJECTION INTRAMUSCULAR; INTRAVENOUS AS NEEDED
Status: DISCONTINUED | OUTPATIENT
Start: 2022-04-13 | End: 2022-04-13 | Stop reason: HOSPADM

## 2022-04-13 RX ORDER — SODIUM CHLORIDE, SODIUM LACTATE, POTASSIUM CHLORIDE, CALCIUM CHLORIDE 600; 310; 30; 20 MG/100ML; MG/100ML; MG/100ML; MG/100ML
100 INJECTION, SOLUTION INTRAVENOUS CONTINUOUS
Status: DISCONTINUED | OUTPATIENT
Start: 2022-04-13 | End: 2022-04-13 | Stop reason: HOSPADM

## 2022-04-13 RX ORDER — HYDROMORPHONE HYDROCHLORIDE 2 MG/ML
0.5 INJECTION, SOLUTION INTRAMUSCULAR; INTRAVENOUS; SUBCUTANEOUS
Status: DISCONTINUED | OUTPATIENT
Start: 2022-04-13 | End: 2022-04-13 | Stop reason: HOSPADM

## 2022-04-13 RX ORDER — CELECOXIB 200 MG/1
200 CAPSULE ORAL ONCE
Status: COMPLETED | OUTPATIENT
Start: 2022-04-13 | End: 2022-04-13

## 2022-04-13 RX ORDER — DIPHENHYDRAMINE HCL 25 MG
25 CAPSULE ORAL
Status: DISCONTINUED | OUTPATIENT
Start: 2022-04-13 | End: 2022-04-14 | Stop reason: HOSPADM

## 2022-04-13 RX ORDER — MIDAZOLAM HYDROCHLORIDE 1 MG/ML
2 INJECTION, SOLUTION INTRAMUSCULAR; INTRAVENOUS ONCE
Status: COMPLETED | OUTPATIENT
Start: 2022-04-13 | End: 2022-04-13

## 2022-04-13 RX ADMIN — GLYCOPYRROLATE 0.1 MG: 0.2 INJECTION, SOLUTION INTRAMUSCULAR; INTRAVENOUS at 09:50

## 2022-04-13 RX ADMIN — CELECOXIB 200 MG: 200 CAPSULE ORAL at 06:30

## 2022-04-13 RX ADMIN — CELECOXIB 200 MG: 200 CAPSULE ORAL at 21:22

## 2022-04-13 RX ADMIN — MIDAZOLAM 1 MG: 1 INJECTION INTRAMUSCULAR; INTRAVENOUS at 08:10

## 2022-04-13 RX ADMIN — LIDOCAINE HYDROCHLORIDE 60 MG: 20 INJECTION, SOLUTION EPIDURAL; INFILTRATION; INTRACAUDAL; PERINEURAL at 08:17

## 2022-04-13 RX ADMIN — GLYCOPYRROLATE 0.1 MG: 0.2 INJECTION, SOLUTION INTRAMUSCULAR; INTRAVENOUS at 08:16

## 2022-04-13 RX ADMIN — PROPOFOL 20 MG: 10 INJECTION, EMULSION INTRAVENOUS at 08:16

## 2022-04-13 RX ADMIN — ONDANSETRON 4 MG: 2 INJECTION INTRAMUSCULAR; INTRAVENOUS at 08:13

## 2022-04-13 RX ADMIN — Medication 3 AMPULE: at 06:30

## 2022-04-13 RX ADMIN — CEFAZOLIN SODIUM 2 G: 10 INJECTION, POWDER, FOR SOLUTION INTRAVENOUS at 23:48

## 2022-04-13 RX ADMIN — MIDAZOLAM 2 MG: 1 INJECTION INTRAMUSCULAR; INTRAVENOUS at 07:37

## 2022-04-13 RX ADMIN — PROPOFOL 30 MG: 10 INJECTION, EMULSION INTRAVENOUS at 09:50

## 2022-04-13 RX ADMIN — SODIUM CHLORIDE, SODIUM LACTATE, POTASSIUM CHLORIDE, AND CALCIUM CHLORIDE 100 ML/HR: 600; 310; 30; 20 INJECTION, SOLUTION INTRAVENOUS at 06:38

## 2022-04-13 RX ADMIN — TRANEXAMIC ACID 1 G: 100 INJECTION, SOLUTION INTRAVENOUS at 08:18

## 2022-04-13 RX ADMIN — PROPOFOL 50 MCG/KG/MIN: 10 INJECTION, EMULSION INTRAVENOUS at 08:17

## 2022-04-13 RX ADMIN — CEFAZOLIN SODIUM 2 G: 10 INJECTION, POWDER, FOR SOLUTION INTRAVENOUS at 15:36

## 2022-04-13 RX ADMIN — METHOCARBAMOL TABLETS 750 MG: 750 TABLET, COATED ORAL at 15:36

## 2022-04-13 RX ADMIN — DEXAMETHASONE SODIUM PHOSPHATE 10 MG: 10 INJECTION INTRAMUSCULAR; INTRAVENOUS at 08:18

## 2022-04-13 RX ADMIN — SODIUM CHLORIDE, SODIUM LACTATE, POTASSIUM CHLORIDE, AND CALCIUM CHLORIDE: 600; 310; 30; 20 INJECTION, SOLUTION INTRAVENOUS at 08:28

## 2022-04-13 RX ADMIN — ACETAMINOPHEN 1000 MG: 500 TABLET, FILM COATED ORAL at 21:21

## 2022-04-13 RX ADMIN — Medication 81 MG: at 21:22

## 2022-04-13 RX ADMIN — DEXAMETHASONE SODIUM PHOSPHATE 4 MG: 4 INJECTION, SOLUTION INTRAMUSCULAR; INTRAVENOUS at 07:40

## 2022-04-13 RX ADMIN — Medication 2 G: at 08:18

## 2022-04-13 RX ADMIN — LIDOCAINE HYDROCHLORIDE 40 MG: 20 INJECTION, SOLUTION EPIDURAL; INFILTRATION; INTRACAUDAL; PERINEURAL at 08:16

## 2022-04-13 RX ADMIN — METHOCARBAMOL TABLETS 750 MG: 750 TABLET, COATED ORAL at 21:22

## 2022-04-13 RX ADMIN — FENTANYL CITRATE 50 MCG: 50 INJECTION, SOLUTION INTRAMUSCULAR; INTRAVENOUS at 07:37

## 2022-04-13 RX ADMIN — ROPIVACAINE HYDROCHLORIDE 20 ML: 2 INJECTION, SOLUTION EPIDURAL; INFILTRATION at 07:40

## 2022-04-13 RX ADMIN — ACETAMINOPHEN 1000 MG: 500 TABLET, FILM COATED ORAL at 15:36

## 2022-04-13 NOTE — PROGRESS NOTES
Problem: Mobility Impaired (Adult and Pediatric)  Goal: *Acute Goals and Plan of Care (Insert Text)  Outcome: Progressing Towards Goal  Note: GOALS (1-4 days):  (1.)Ms. Rodger Florian will move from supine to sit and sit to supine  in bed with STAND BY ASSIST.  (2.)Ms. Rodger Florian will transfer from bed to chair and chair to bed with STAND BY ASSIST using the least restrictive device. (3.)Ms. Rodger Florian will ambulate with STAND BY ASSIST for 200 feet with the least restrictive device. (4.)Ms. Rodger Florian will ambulate up/down 10 steps with right railing with CONTACT GUARD ASSIST with cane. (5.)Ms. Rodger Florian will increase right knee ROM to 0°-90°.  ________________________________________________________________________________________________        PHYSICAL THERAPY JOINT CAMP TKA: Initial Assessment and PM 4/13/2022  OUTPATIENT: Hospital Day: 1  [place] / [place] B / Product Type: Medicare /      NAME/AGE/GENDER: Courtney Kate is a 77 y.o. female   PRIMARY DIAGNOSIS:  Primary osteoarthritis of right knee [M17.11]   Procedure(s) and Anesthesia Type:     * RIGHT KNEE ARTHROPLASTY TOTAL ROBOTIC ASSISTED TANYA MILDRED SPINAL/ADDUCTOR-Spinal (Right)  ICD-10: Treatment Diagnosis:    Pain in Right Knee (M25.561)  Stiffness of Right Knee, Not elsewhere classified (M25.661)  Difficulty in walking, Not elsewhere classified (R26.2)      ASSESSMENT:     Ms. Rodger Florian presents with decreased strength and range of motion right lower extremity and with decreased independence with functional mobility s/p right TKA. Pt will benefit from skilled PT interventions to maximize independence with functional mobility and TKA management. Pt did well with assessment. She was already up with student nurse and OT. PT had patient stand and ambulate 40 ft with RW and Min assist then she transferred back into the chair and performed LE therex. Patient left up in chair. Pt instructed not to get up without assistance.  Hope to progress mobility and exercises in the morning. Pt plans to discharge to her home with her  and with continued therapy for follow up. This section established at most recent assessment   PROBLEM LIST (Impairments causing functional limitations):  Decreased Strength  Decreased ADL/Functional Activities  Decreased Transfer Abilities  Decreased Ambulation Ability/Technique  Decreased Flexibility/Joint Mobility  Edema/Girth  Decreased [place] with Home Exercise Program   INTERVENTIONS PLANNED: (Benefits and precautions of physical therapy have been discussed with the patient.)  Bed Mobility  Cold  Gait Training  Home Exercise Program (HEP)  Range of Motion (ROM)  Therapeutic Activites  Therapeutic Exercise/Strengthening  Transfer Training     TREATMENT PLAN: Frequency/Duration: Follow patient BID for duration of hospital stay to address above goals. Rehabilitation Potential For Stated Goals: Excellent     RECOMMENDED REHABILITATION/EQUIPMENT: (at time of discharge pending progress): Continue Skilled Therapy and Home Health: Physical Therapy. HISTORY:   History of Present Injury/Illness (Reason for Referral):  Pt s/p total knee arthroplasty on R LE  Past Medical History/Comorbidities:   Ms. Kandace Ochoa  has a past medical history of Arthritis, GERD (gastroesophageal reflux disease), Hypertension, Iron deficiency anemia, Osteoarthritis, and Vertigo. Ms. Kandace Ochoa  has a past surgical history that includes hx hysterectomy (1995); hx tonsillectomy; hx colonoscopy; and hx orthopaedic (Left, 02/2020). Social History/Living Environment:   Home Environment: Private residence  # Steps to Enter: 10  Rails to Enter: Yes  Hand Rails : Right  One/Two Story Residence: One story  Living Alone: No  Support Systems: Spouse/Significant Other  Patient Expects to be Discharged to[de-identified] Home with home health  Current DME Used/Available at Home: None  Tub or Shower Type: Shower  Prior Level of Function/Work/Activity:  Independent prior to admit. Lives with her . Number of Personal Factors/Comorbidities that affect the Plan of Care: 0: LOW COMPLEXITY   EXAMINATION:   Most Recent Physical Functioning:      Gross Assessment  AROM: Within functional limits (limited R LE)  Strength: Within functional limits (limited R LE)                          Transfers  Sit to Stand: Contact guard assistance  Stand to Sit: Contact guard assistance  Bed to Chair: Minimum assistance    Balance  Sitting: Intact  Standing: Pull to stand; With support              Weight Bearing Status  Right Side Weight Bearing: As tolerated  Distance (ft): 40 Feet (ft)  Ambulation - Level of Assistance: Minimal assistance  Assistive Device: Walker, rolling  Speed/Katia: Pace decreased (<100 feet/min)  Step Length: Left shortened  Stance: Right decreased  Gait Abnormalities: Antalgic;Decreased step clearance; Step to gait  Interventions: Verbal cues; Safety awareness training     Braces/Orthotics: none    Right Knee Cold  Type: Cryocuff      Body Structures Involved:  Joints  Muscles Body Functions Affected: Movement Related Activities and Participation Affected: Mobility  Self Care   Number of elements that affect the Plan of Care: 4+: HIGH COMPLEXITY   CLINICAL PRESENTATION:   Presentation: Stable and uncomplicated: LOW COMPLEXITY   CLINICAL DECISION MAKIN[address] [place] [address] AM-PAC 6 Clicks   Basic Mobility Inpatient Short Form  How much difficulty does the patient currently have. .. Unable A Lot A Little None   1. Turning over in bed (including adjusting bedclothes, sheets and blankets)? [] 1   [] 2   [x] 3   [] 4   2. Sitting down on and standing up from a chair with arms ( e.g., wheelchair, bedside commode, etc.)   [] 1   [] 2   [x] 3   [] 4   3. Moving from lying on back to sitting on the side of the bed? [] 1   [] 2   [x] 3   [] 4   How much help from another person does the patient currently need. .. Total A Lot A Little None   4.   Moving to and from a bed to a chair (including a wheelchair)? [] 1   [] 2   [x] 3   [] 4   5. Need to walk in hospital room? [] 1   [] 2   [x] 3   [] 4   6. Climbing 3-5 steps with a railing? [] 1   [] 2   [x] 3   [] 4   © 2007, Trustees of [facility] [employer], under license to [employer]. All rights reserved     Score:  Initial: 18 Most Recent: X (Date: -- )    Interpretation of Tool:  Represents activities that are increasingly more difficult (i.e. Bed mobility, Transfers, Gait). Medical Necessity:     Patient is expected to demonstrate progress in   strength, range of motion, and functional technique   to   decrease assistance required with functional mobility and TKA managment  . Reason for Services/Other Comments:  Patient continues to require skilled intervention due to   Inability to complete functional mobility and TKA management independently  . Use of outcome tool(s) and clinical judgement create a POC that gives a: Clear prediction of patient's progress: LOW COMPLEXITY            TREATMENT:   (In addition to Assessment/Re-Assessment sessions the following treatments were rendered)     Pre-treatment Symptoms/Complaints:  none  Pain Initial:      Post Session:  0     Therapeutic Activity: (    30 minutes): Therapeutic activities including Chair transfers, Ambulation on level ground, and LE therex to improve mobility, strength, and balance. Required minimal Verbal cues; Safety awareness training to promote dynamic balance in standing.        ASSESSMENT TODAY    Date:  4/13 Date:   Date:     ACTIVITY/EXERCISE AM PM AM PM AM PM     []  []  []  []  []  []   Ankle Pumps  15       Quad Sets  15       Gluteal Sets  15       Hip ABd/ADduction  15       Straight Leg Raises  15       Knee Slides  15       Short Arc Quads  15       Chair Slides                           B = bilateral; AA = active assistive; A = active; P = passive      Treatment/Session Assessment:     Response to Treatment:  tolerated therapy well but wants to stay until tomorrow. Education:  [x] Home Exercises  [x] Fall Precautions  [x] Use of Cold Therapy Unit [] D/C Instruction Review  [] Knee Prosthesis Review  [x] Walker Management/Safety [] Adaptive Equipment as Needed  [x] No pillow under knee       Interdisciplinary Collaboration:   Physical Therapist  Occupational Therapist  Registered Nurse    After treatment position/precautions:   Up in chair  Bed/Chair-wheels locked  Bed in low position  Call light within reach  RN notified    Compliance with Program/Exercises: Compliant all of the time. Recommendations/Intent for next treatment session:  Treatment next visit will focus on increasing Ms. Fair's independence with bed mobility, transfers, gait training, strength/ROM exercises, modalities for pain, and patient education.       Total Treatment Duration:  PT Patient Time In/Time Out  Time In: 1600  Time Out: Tobiase Eldon [address], PT

## 2022-04-13 NOTE — OP NOTES
[facility] Robotic Assisted Total Knee Arthroplasty: Posterior Cruciate Retaining       Patient:Richard Crockett   : 1955  Medical Record Number:[id number]  Pre-operative Diagnosis:  Primary osteoarthritis of right knee [M17.11]  Post-operative Diagnosis: Primary osteoarthritis of right knee [M17.11]  Location: [address] [place] [address]  Surgeon: Jeremy Gurrola MD   Assistant: Larissa Oliva    Anesthesia: Spinal and ACB    Indications: Patient has end stage arthritis. They have tried and failed conservative management. Procedure:            CPT- 11428- Total knee arthroplasty           46161- Other procedures on musculoskeletal system            0055T- Computer assisted surgical navigation   The complexity of the total joint surgery requires the use of a first assistant for positioning, retraction and expertise in closure. Tourniquet Time: 0 minutes  EBL: 250 cc  Findings: severe degenerative arthritis with loss of cartilage in weight bearing compartments of the knee,  patellar osteophytes with loss of patella cartilage, posterior femoral osteophytes   BMI: Body mass index is 32.91 kg/m². Asuncion Dumont was brought to the operating room and positioned on the operating table. She was anesthestized with anesthesia. IV antibiotics were administered. Prior to the incision being made a timeout was called identifying the patient, procedure ,operative side and surgeon The operative leg was prepped and draped in the usual sterile manner. An anterior longitudinal incision was accomplished just medial to the tibial tubercle and extending approximal 6 centimeters proximal to the superior pole of the patella. A medial parapatellar capsular incision was performed. The medial capsular flap was elevated around to the insertion of the semimembranous tendon. The patella was everted and the knee flexed and externally rotated. The medial and external menisci were excised.   The lateral half of the fat pad excised and the patella femoral ligament was released. The anterior cruciate ligament was resect and the posterior cruciate ligament was retained. The femoral and tibial arrays were pinned in place and registered with the [employer] 92. The patient landmarks were collected and the tibial and femoral checkpoints were registered and verified. The preresection balancing was performed. The distal femur was addressed first. Utilizing the [facility] robotic arm the distal femoral cut was made. The anterior and posterior cuts were then made. The osteophytes were removed from the tibial and femoral surfaces. The tibia was then addressed. The [employer] robotic arm was then used to make the measured resection of the tibia. The tibia was sized. The tibial base plate was pinned into place with the appropriate external rotation and stem site prepared. A trial femoral component and poly was placed. A preliminary range of motion was accomplished with the trial components. The patient was found to obtain full extension as well as appropriate flexion. The patient's ligaments were stable in flexion and extension to medial and lateral stressing and the alignment was through the appropriate mechanical axis. Additional surgical procedures included: none. The patella was then everted. The bone was resect to accommodate the three peg patellar button. A trial reduction of the patella revealed appropriate tracking through the patellofemoral groove with no lateral retinacular release being accomplished. All trial components were removed. The real implants were opened: Sizes listed below. The knee was irrigated. There were no femoral deficiencies. There were no tibial deficiencies. No augmentation was utilized. The tibial and femoral components were impacted into place. The patella component was cemented into place.     [facility] knee was placed through range of motion and noted to be stable as mentioned above with the trail components. The wound was dry, therefore no drain was used. The operative knee was injected with 60 cc of Naropin, 10 cc's of morphine and 1 cc of 30 mg of Toradol. The knee was then soaked with a diluted betadine solution for approximately 3 min. This was then thoroughly irrigated. The capsular layer was closed using a #1 Stratafix suture. Then, 1 gram (100 mg/ml) of Transexamic Acid was injected into the joint space. The subcutaneous layers were closed using 2-0 Stratafix. Finally the skin was closed using 3-0 Vicryl and staples which were applied in occlusive fashion and sterile bandage applied. An Iceman cryo pad was applied on the operative leg. Sponge count and needle counts were correct. Candie Gordon left the operating room     Implants:   Implant Name Type Inv.  Item Serial No.  Lot No. LRB No. Used Action   CEMENT BNE 20GM HALF DOSE PMMA W/ GENT M VISC RADPQ FAST - [id number]  CEMENT BNE 20GM HALF DOSE PMMA W/ GENT M VISC RADPQ FAST  [id number] [id number] Right 1 Implanted   COMPNT FEM CR TRIATHLN 2 R PA -- MOR-KNEE - [id number]  COMPNT FEM CR TRIATHLN 2 R PA -- MOR-KNEE [id number] [employer] HOWM[employer]WD [id number] Right 1 Implanted   BASEPLATE TIB SZ 3 [id number] ML67MM KNEE TRITANIUM 4 CRUCFRM - [id number]  BASEPLATE TIB SZ 3 [id number] ML67MM KNEE TRITANIUM 4 CRUCFRM [id number] [employer] HOWM[employer]WD [id number] Right 1 Implanted   COMPONENT PAT [id number] SUP INFERIOR ASYM TRIATHLON - [id number]  COMPONENT PAT [id number] SUP INFERIOR ASYM TRIATHLON [id number] [employer] HOWM[employer]WD [id number] Right 1 Implanted   INSERT TIB SZ 3 THK9MM UNIV KNEE POLYETH CNDYL STBL CHIRAG NEUT - [id number]  INSERT TIB SZ 3 THK9MM UNIV KNEE POLYETH CNDYL STBL CHIRAG NEUT [id number] [employer] HOWM_WD [id number] Right 1 Implanted         Signed By: Gurjit Ha MD   4/13/2022,  10:55 AM

## 2022-04-13 NOTE — ANESTHESIA PROCEDURE NOTES
Peripheral Block    Start time: 4/13/2022 7:37 AM  End time: 4/13/2022 7:40 AM  Performed by: Oh Corea MD  Authorized by: Oh Corea MD       Pre-procedure: Indications: at surgeon's request and post-op pain management    Preanesthetic Checklist: patient identified, risks and benefits discussed, site marked, timeout performed, anesthesia consent given and patient being monitored    Timeout Time: 07:37 EDT  Preanesthetic Checklist comment:  Risk of nerve damage discussed. Block Type:   Block Type: Adductor canal  Laterality:  Right  Monitoring:  Standard ASA monitoring, continuous pulse ox, frequent vital sign checks, heart rate, oxygen and responsive to questions  Injection Technique:  Single shot  Procedures: ultrasound guided    Prep: chlorhexidine    Location:  Mid thigh  Needle Type:  Stimuplex (4 inch)  Needle Gauge:  20 G  Needle Localization:  Ultrasound guidance  Medication Injected:  Ropivacaine 0.2% with epinephrine 1:200,000 injection, 20 mL (Mixture components: ropivacaine 2 mg/mL (0.2 %) Soln, 1 mL; EPINEPHrine HCl (PF) 1 mg/mL (1 mL) Soln, . 005 mL)  dexamethasone (DECADRON) 4 mg/mL injection, 4 mg  Med Admin Time: 4/13/2022 7:40 AM    Assessment:  Number of attempts:  1  Injection Assessment:  Incremental injection every 5 mL, local visualized surrounding nerve on ultrasound, negative aspiration for blood, no intravascular symptoms, no paresthesia and ultrasound image on chart  Patient tolerance:  Patient tolerated the procedure well with no immediate complications  3 cc 1% Lidocaine injected at needle insertion site. Needle inserted and placed in close proximity to the nerve under real time ultrasound guidance. Ultrasound was used to visualize the spread of local anesthetic in close proximity to the nerve being blocked. The nerve appeared anatomically normal and there were no abnormal findings.

## 2022-04-13 NOTE — PROGRESS NOTES
Problem: Self Care Deficits Care Plan (Adult)  Goal: *Acute Goals and Plan of Care (Insert Text)  Note: GOALS:   DISCHARGE GOALS (in preparation for going home/rehab):  3 days  1. Ms. Robert Westbrook will perform one lower body dressing activity with minimal assistance required to demonstrate improved functional mobility and safety. 2.  Ms. Robert Westbrook will perform one lower body bathing activity with minimal assistance required to demonstrate improved functional mobility and safety. 3.  Ms. Robert Westbrook will perform toileting/toilet transfer with contact guard assistance to demonstrate improved functional mobility and safety. 4.  Ms. Robert Westbrook will perform shower transfer with contact guard assistance to demonstrate improved functional mobility and safety. [facility] OCCUPATIONAL THERAPY TKA: Initial Assessment and PM 4/13/2022  OUTPATIENT: Hospital Day: 1  [place] / Plan[place] PART [place] / Product Type: Medicare /      NAME/AGE/GENDER: Char Contreras is a 77 y.o. female   PRIMARY DIAGNOSIS:  Primary osteoarthritis of right knee [M17.11]   Procedure(s) and Anesthesia Type:     * RIGHT KNEE ARTHROPLASTY TOTAL ROBOTIC ASSISTED TANYA MILDRED SPINAL/ADDUCTORSDD - Spinal (Right)  ICD-10: Treatment Diagnosis:    Pain in Right Knee (M25.561)  Stiffness of Right Knee, Not elsewhere classified (M25.661)  Other lack of cordination (R27.8)  Difficulty in walking, Not elsewhere classified (R26.2)  Other abnormalities of gait and mobility (R26.89)      ASSESSMENT:     Ms. Robert Westbrook is s/p right TKA and presents with decreased weight bearing on right LE and decreased independence with functional mobility and activities of daily living as compared to baseline level of function and safety. Patient would benefit from skilled Occupational Therapy to maximize independence and safety with self-care task and functional mobility.   Pt would also benefit from education on adaptive equipment and safety precautions in preparation for going home.  Earlier OT assisted patient onto bed pan. She was up on the bsc. She donned a clean taped brief. She did sit to stand multiple time to enter her bladder with CGA/min assist. SHe took steps to recliner with min assist. She was set up in recliner and washed her hands. She ambulated with PT in the room with min/CGA. She plans to spend the night and go home tomorrow. Will see in am for Full ADL session. This section established at most recent assessment   PROBLEM LIST (Impairments causing functional limitations):  Decreased Strength  Decreased ADL/Functional Activities  Decreased Transfer Abilities  Increased Pain  Increased Fatigue  Decreased Flexibility/Joint Mobility  Decreased Knowledge of Precautions   INTERVENTIONS PLANNED: (Benefits and precautions of occupational therapy have been discussed with the patient.)  Activities of daily living training  Adaptive equipment training  Balance training  Clothing management  Donning&doffing training  Theraputic activity     TREATMENT PLAN: Frequency/Duration: Follow patient 1-2 times to address above goals. Rehabilitation Potential For Stated Goals: Good     RECOMMENDED REHABILITATION/EQUIPMENT: (at time of discharge pending progress): Continue Skilled Therapy. OCCUPATIONAL PROFILE AND HISTORY:   History of Present Injury/Illness (Reason for Referral): Pt presents this date s/p (right) TKA. Past Medical History/Comorbidities:   Ms. Lisa Boykin  has a past medical history of Arthritis, GERD (gastroesophageal reflux disease), Hypertension, Iron deficiency anemia, Osteoarthritis, and Vertigo. Ms. Lisa Boykin  has a past surgical history that includes hx hysterectomy (1995); hx tonsillectomy; hx colonoscopy; and hx orthopaedic (Left, 02/2020).   Social History/Living Environment:   Home Environment: Private residence  # Steps to Enter: 10  Rails to Enter: Yes  Hand Rails : Right  One/Two Story Residence: One story  Living Alone: No  Support Systems: Spouse/Significant Other  Patient Expects to be Discharged to[de-identified] Home with home health  Current DME Used/Available at Home: None  Tub or Shower Type: Shower    Prior Level of Function/Work/Activity:  Mod I     Number of Personal Factors/Comorbidities that affect the Plan of Care: Brief history (0):  LOW COMPLEXITY   ASSESSMENT OF OCCUPATIONAL PERFORMANCE[de-identified]   Most Recent Physical Functioning:   Balance  Sitting: Intact  Standing: Pull to stand; With support                    Coordination  Fine Motor Skills-Upper: Left Intact; Right Intact  Gross Motor Skills-Upper: Left Intact; Right Intact         Mental Status  Neurologic State: Alert; Appropriate for age  Orientation Level: Appropriate for age  Cognition: Appropriate decision making; Appropriate for age attention/concentration; Appropriate safety awareness; Follows commands  Perception: Appears intact  Perseveration: No perseveration noted  Safety/Judgement: Awareness of environment; Fall prevention                Basic ADLs (From Assessment) Complex ADLs (From Assessment)   Basic ADL  Feeding: Independent  Oral Facial Hygiene/Grooming: Supervision  Bathing: Minimum assistance,Moderate assistance  Upper Body Dressing: Supervision  Lower Body Dressing: Minimum assistance,Moderate assistance  Toileting: Minimum assistance     Grooming/Bathing/Dressing Activities of Daily Living     Cognitive Retraining  Safety/Judgement: Awareness of environment; Fall prevention                 Functional Transfers  Toilet Transfer : Minimum assistance  Shower Transfer: Minimum assistance     Bed/Mat Mobility  Sit to Stand: Contact guard assistance  Stand to Sit: Minimum assistance         Physical Skills Involved:  Balance  Strength  Activity Tolerance Cognitive Skills Affected (resulting in the inability to perform in a timely and safe manner):  none Psychosocial Skills Affected:  Habits/Routines  Environmental Adaptation  Self-Awareness   Number of elements that affect the Plan of Care: 5+:  HIGH COMPLEXITY   CLINICAL DECISION MAKING:   [facility] AM-PAC 6 Clicks   Daily Activity Inpatient Short Form  How much help from another person does the patient currently need. .. Total A Lot A Little None   1. Putting on and taking off regular lower body clothing? [] 1   [x] 2   [] 3   [] 4   2. Bathing (including washing, rinsing, drying)? [] 1   [x] 2   [] 3   [] 4   3. Toileting, which includes using toilet, bedpan or urinal?   [] 1   [] 2   [x] 3   [] 4   4. Putting on and taking off regular upper body clothing? [] 1   [] 2   [] 3   [x] 4   5. Taking care of personal grooming such as brushing teeth? [] 1   [] 2   [] 3   [x] 4   6. Eating meals? [] 1   [] 2   [] 3   [x] 4   © 2007, Trustees of [facility], under license to [place]. All rights reserved     Score:  Initial: 19 Most Recent: X (Date: -- )    Interpretation of Tool:  Represents activities that are increasingly more difficult (i.e. Bed mobility, Transfers, Gait). Medical Necessity:     · Patient is expected to demonstrate progress in   · Self care skills and functional mobility  ·     Reason for Services/Other Comments:  · Patient continues to require skilled intervention due to   · Above listed deficits     Use of outcome tool(s) and clinical judgement create a POC that gives a: LOW COMPLEXITY            TREATMENT:   (In addition to Assessment/Re-Assessment sessions the following treatments were rendered)     Pre-treatment Symptoms/Complaints:    Pain: Initial:   Pain Intensity 1: 2  Post Session:  2/10 rest     Self Care: (25): Procedure(s) (per grid) utilized to improve and/or restore self-care/home management as related to dressing, toileting, grooming, and functional transfers . Required minimal visual, verbal, manual, and tactile cueing to facilitate activities of daily living skills and compensatory activities.   Assessment complete    Treatment/Session Assessment:     Response to Treatment: tolerated well. Education:  [] Home Exercises  [x] Fall Precautions  [] Hip Precautions [] Going Home Video  [x] Knee/Hip Prosthesis Review  [x] Walker Management/Safety [x] Adaptive Equipment as Needed       Interdisciplinary Collaboration:   Physical Therapist  Occupational Therapist  Registered Nurse    After treatment position/precautions:   Up in chair  Bed/Chair-wheels locked  Bed in low position  Caregiver at bedside  Call light within reach  RN notified     Compliance with Program/Exercises: Will assess as treatment progresses. Recommendations/Intent for next treatment session:  Treatment next visit will focus on increasing Ms. Fair's independence with bed mobility, transfers, self care, functional mobility, modalities for pain, and patient education.       Total Treatment Duration:25  OT Patient Time In/Time Out  Time In: 1540  Time Out: [address], OT

## 2022-04-13 NOTE — ANESTHESIA POSTPROCEDURE EVALUATION
Procedure(s):  RIGHT KNEE ARTHROPLASTY TOTAL ROBOTIC ASSISTED TANYA MILDRED SPINAL/ADDUCTOR    spinal    Anesthesia Post Evaluation        Patient location during evaluation: PACU  Patient participation: complete - patient participated  Level of consciousness: awake  Pain management: satisfactory to patient  Airway patency: patent  Anesthetic complications: no  Cardiovascular status: hemodynamically stable  Respiratory status: spontaneous ventilation  Hydration status: euvolemic  Post anesthesia nausea and vomiting:  none      INITIAL Post-op Vital signs:   Vitals Value Taken Time   /81 04/13/22 1058   Temp 37.1 °C (98.7 °F) 04/13/22 1028   Pulse 58 04/13/22 1058   Resp 16 04/13/22 1058   SpO2 100 % 04/13/22 1028

## 2022-04-13 NOTE — INTERVAL H&P NOTE
Update History & Physical    The Patient's History and Physical of April 4,   H&P was reviewed with the patient and I examined the patient. There was no change. The surgical site was confirmed by the patient and me. Plan:  The risk, benefits, expected outcome, and alternative to the recommended procedure have been discussed with the patient. Patient understands and wants to proceed with the procedure.     Electronically signed by Beverly Sotelo MD on 4/13/2022 at 6:34 AM

## 2022-04-13 NOTE — PROGRESS NOTES
04/13/22 1534   Oxygen Therapy   O2 Sat (%) 95 %   Pulse via Oximetry 72 beats per minute   O2 Device None (Room air)   O2 Flow Rate (L/min) 0 l/min   Incentive Spirometry Treatment   Actual Volume (ml) 1500 ml   Number of Attempts 1   Patient achieved    1500    Ml/sec on IS. Patient encouraged to do every hour while awake-patient agreed and demonstrated. No shortness of breath or distress noted. BS are clear b/l.    [facility] notes reviewed- continuous sat # ordered HS

## 2022-04-13 NOTE — ANESTHESIA PREPROCEDURE EVALUATION
Relevant Problems   CARDIOVASCULAR   (+) Essential hypertension       Anesthetic History   No history of anesthetic complications            Review of Systems / Medical History  Pertinent labs reviewed    Pulmonary  Within defined limits        Undiagnosed apnea         Neuro/Psych   Within defined limits           Cardiovascular    Hypertension              Exercise tolerance: >4 METS  Comments: Unremarkable stress test 2018.    GI/Hepatic/Renal     GERD: well controlled           Endo/Other        Arthritis and anemia     Other Findings              Physical Exam    Airway  Mallampati: II  TM Distance: 4 - 6 cm  Neck ROM: normal range of motion   Mouth opening: Normal     Cardiovascular  Regular rate and rhythm,  S1 and S2 normal,  no murmur, click, rub, or gallop             Dental    Dentition: Lower partial plate and Upper partial plate     Pulmonary  Breath sounds clear to auscultation               Abdominal  GI exam deferred       Other Findings            Anesthetic Plan    ASA: 2  Anesthesia type: spinal      Post-op pain plan if not by surgeon: peripheral nerve block single    Induction: Intravenous  Anesthetic plan and risks discussed with: Patient

## 2022-04-13 NOTE — PERIOP NOTES
TRANSFER - OUT REPORT:    Verbal report given to [facility] RN on [employer]  being transferred to [facility] for routine post - op       Report consisted of patients Situation, Background, Assessment and   Recommendations(SBAR). Information from the following report(s) SBAR was reviewed with the receiving nurse. Opportunity for questions and clarification was provided.       Patient transported with:   [employer]

## 2022-04-14 VITALS
TEMPERATURE: 97.7 F | WEIGHT: 207 LBS | HEIGHT: 67 IN | HEART RATE: 62 BPM | SYSTOLIC BLOOD PRESSURE: 130 MMHG | DIASTOLIC BLOOD PRESSURE: 88 MMHG | RESPIRATION RATE: 17 BRPM | OXYGEN SATURATION: 96 % | BODY MASS INDEX: 32.49 KG/M2

## 2022-04-14 LAB — HGB BLD-MCNC: 9.9 G/DL (ref 11.7–15.4)

## 2022-04-14 PROCEDURE — 99024 POSTOP FOLLOW-UP VISIT: CPT | Performed by: ORTHOPAEDIC SURGERY

## 2022-04-14 PROCEDURE — 74011250637 HC RX REV CODE- 250/637: Performed by: PHYSICIAN ASSISTANT

## 2022-04-14 PROCEDURE — 36415 COLL VENOUS BLD VENIPUNCTURE: CPT

## 2022-04-14 PROCEDURE — 97530 THERAPEUTIC ACTIVITIES: CPT

## 2022-04-14 PROCEDURE — 85018 HEMOGLOBIN: CPT

## 2022-04-14 PROCEDURE — 97535 SELF CARE MNGMENT TRAINING: CPT

## 2022-04-14 RX ADMIN — OXYCODONE 10 MG: 5 TABLET ORAL at 04:24

## 2022-04-14 RX ADMIN — Medication 1 AMPULE: at 08:57

## 2022-04-14 RX ADMIN — METHOCARBAMOL TABLETS 750 MG: 750 TABLET, COATED ORAL at 09:00

## 2022-04-14 RX ADMIN — ACETAMINOPHEN 1000 MG: 500 TABLET, FILM COATED ORAL at 04:10

## 2022-04-14 RX ADMIN — ONDANSETRON 4 MG: 4 TABLET, ORALLY DISINTEGRATING ORAL at 10:18

## 2022-04-14 RX ADMIN — Medication 81 MG: at 08:57

## 2022-04-14 RX ADMIN — PANTOPRAZOLE SODIUM 40 MG: 40 TABLET, DELAYED RELEASE ORAL at 05:55

## 2022-04-14 RX ADMIN — CELECOXIB 200 MG: 200 CAPSULE ORAL at 08:57

## 2022-04-14 RX ADMIN — OXYCODONE 10 MG: 5 TABLET ORAL at 08:57

## 2022-04-14 RX ADMIN — LISINOPRIL AND HYDROCHLOROTHIAZIDE 1 TABLET: 12.5; 2 TABLET ORAL at 08:57

## 2022-04-14 RX ADMIN — FERROUS SULFATE 325 MG: 325 TABLET ORAL at 05:55

## 2022-04-14 RX ADMIN — DOCUSATE SODIUM 50MG AND SENNOSIDES 8.6MG 2 TABLET: 8.6; 5 TABLET, FILM COATED ORAL at 08:57

## 2022-04-14 NOTE — PROGRESS NOTES
Care Management Interventions  PCP Verified by CM: Yes  Mode of Transport at Discharge: Self  Transition of Care Consult (CM Consult): Home Health,DME/Supply [address]: Yes  Discharge Durable Medical Equipment: Yes  Physical Therapy Consult: Yes  Occupational Therapy Consult: No  Speech Therapy Consult: No  Support Systems: Spouse/Significant Other  Confirm Follow Up Transport: Family  The Patient and/or Patient Representative was Provided with a Choice of Provider and Agrees with the Discharge Plan?: Yes  Name of the Patient Representative Who was Provided with a Choice of Provider and Agrees with the Discharge Plan: Lisa Cano, pt  Discharge Location  Patient Expects to be Discharged to[de-identified] Home with family assistance     CM met with patient to confirm discharge plans. Pt to discharge home with spouse. She is in agreement with plan for home health services and referral to [facility]. Pt in need of DME (rolling walker and vania) and has no preference in providers. DME ordered from [facility] and delivered to patient's room by CM. No other discharge planning needs noted at this time.

## 2022-04-14 NOTE — DISCHARGE INSTRUCTIONS
[address] [facility]   Patient Discharge Instructions    Lisa Cano / [id number] : 1955    Admitted 2022 Discharged: 2022     IF YOU HAVE ANY PROBLEMS ONCE YOU ARE AT HOME CALL THE FOLLOWING NUMBERS:   Main office number: [phone]      Medications    · The medications you are to continue on are listed on the medication reconciliation sheet. · Narcotic pain medications as well as supplemental iron can cause constipation. If this occurs try stopping the narcotic pain medication and/or the iron. · It is important that you take the medication exactly as they are prescribed. · Medications which increase your risk of blood clots are listed to stop for 5 weeks after surgery as well as medications or supplements which increase your risk of bleeding complications. · Keep your medication in the bottles provided by the pharmacist and keep a list of the medication names, dosages, and times to be taken in your wallet. · Do not take other medications without consulting your doctor. Important Information    Do NOT smoke as this will greatly increase your risk of infection! Resume your prehospital diet. If you have excessive nausea or vomitting call your doctor's office     Leg swelling and warmth is normal for 6 months after surgery. If you experience swelling in your leg elevate you leg while laying down with your toes above your heart. If you have sudden onset severe swelling with leg pain call our office. The stitches deep inside take approximately 6 months to dissolve. There will be sharp shooting, stinging and burning pain. This is normal and will resolve between 3-6 months after surgery. Difficulty sleeping is normal following total Knee and Hip replacement. You may try melatonin, an over-the-counter sleep aid or benadryl to help with sleep. Most patients will resume sleeping through the night 8 weeks after surgery. Home Physical Therapy is arranged.  Home Health will contact you within 48 hrs of discharge that you have chosen. If you have not received a call within this time frame please contact that provider you chose. You should be given this information before you leave the hospital.     You are at a risk for falls. Use the rolling walker when walking. Patients who have had a joint replacement should not drive if they are still taking narcotic pain mediation during the daytime hours. Most patients wean themselves off of pain medication within 2-5 weeks after surgery. When to Call the office    - If you have a temperature greater then 101  - Uncontrolled vomiting   - Loose control of your bladder or bowel function  - Are unable to bear any wieght   - Need a pain medication refill     Patient Education        Total Knee Replacement: What to Expect at  [address] had a total knee replacement. The doctor replaced the worn ends of the bones that connect to your knee (thighbone and lower leg bone) with plastic and metal parts. When you leave the hospital, you should be able to move around with a walker or crutches. But you will need someone to help you at home until you have more energy and can move around better. You will go home with a bandage and stitches, staples, skin glue, or tape strips. Change the bandage as your doctor tells you to. If you have stitches or staples, your doctor will remove them about 2 weeks after your surgery. Glue or tape strips will fall off on their own over time. You may still have some mild pain, and the area may be swollen for a few months after surgery. Your knee will continue to improve for up to a year. You will probably use a walker for some time after surgery. When you are ready, you can use a cane. You may be able to walk without support after a couple weeks, or when you are comfortable. You will need to do months of physical rehabilitation (rehab) after a knee replacement.  Rehab will help you strengthen the muscles of the knee and help you regain movement. After you recover, your artificial knee will allow you to do normal daily activities with less pain or no pain at all. You may be able to hike, dance, or ride a bike. Talk to your doctor about whether you can do more strenuous activities. Always tell your caregivers that you have an artificial knee. How long it will take to walk on your own, return to normal activities, and go back to work depends on your health and how well your rehabilitation (rehab) program goes. The better you do with your rehab exercises, the quicker you will get your strength and movement back. This care sheet gives you a general idea about how long it will take for you to recover. But each person recovers at a different pace. Follow the steps below to get better as quickly as possible. How can you care for yourself at home? Activity    · Rest when you feel tired. You may take a nap, but don't stay in bed all day. When you sit, use a chair with arms. You can use the arms to help you stand up.     · Work with your physical therapist to find the best way to exercise. What you can do as your knee heals will depend on whether your new knee is cemented or uncemented. You may not be able to do certain things for a while if your new knee is uncemented.     · After your knee has healed enough, you can do more strenuous activities with caution. ? You can golf, but you may want to use a golf cart for some time. And don't wear shoes with spikes. ? You can bike on a flat road or on a stationary bike. Talk to your doctor before biking uphill. ? Your doctor may suggest that you stay away from activities that put stress on your knee. These include tennis, badminton, contact sports like football, jumping (such as in basketball), jogging, and running. ? Avoid activities where you might fall.     · Do not sit for more than 1 hour at a time. Get up and walk around for a while before you sit again.  If you must sit for a long time, prop up your leg with a chair or footstool. This will help you avoid swelling.     · Ask your doctor when you can drive again. It may take several weeks after knee replacement surgery before it's safe for you to drive.     · When you get into a car, sit on the edge of the seat. Then pull in your legs, and turn to face the front.     · You should be able to do many everyday activities 3 to 6 weeks after your surgery. You will probably need to take 4 to 16 weeks off from work. When you can go back to work depends on the type of work you do and how you feel.     · Ask your doctor when it is okay for you to have sex.     · For 12 weeks, do not lift anything heavier than 10 pounds and do not lift weights. Diet    · By the time you leave the hospital, you should be eating your normal diet. If your stomach is upset, try bland, low-fat foods like plain rice, broiled chicken, toast, and yogurt. Your doctor may suggest that you take iron and vitamin supplements.     · Drink plenty of fluids (unless your doctor tells you not to).   · Eat healthy foods, and watch your portion sizes. Try to stay at your ideal weight. Too much weight puts more stress on your new knee.     · You may notice that your bowel movements are not regular right after your surgery. This is common. Try to avoid constipation and straining with bowel movements. Drinking enough fluids, taking a stool softener, and eating foods that are good sources of fiber can help you avoid constipation. If you have not had a bowel movement after a couple of days, talk to your doctor. Medicines    · Your doctor will tell you if and when you can restart your medicines. You will also get instructions about taking any new medicines.     · If you take aspirin or some other blood thinner, ask your doctor if and when to start taking it again.  Make sure that you understand exactly what your doctor wants you to do.     · Your doctor may give you a blood-thinning medicine to prevent blood clots. If you take a blood thinner, be sure you get instructions about how to take your medicine safely. Blood thinners can cause serious bleeding problems. This medicine could be in pill form or as a shot (injection). If a shot is needed, your doctor will tell you how to do this.     · Be safe with medicines. Take pain medicines exactly as directed. ? If the doctor gave you a prescription medicine for pain, take it as prescribed. ? If you are not taking a prescription pain medicine, ask your doctor if you can take an over-the-counter medicine. ? Plan to take your pain medicine 30 minutes before exercises. It is easier to prevent pain before it starts than to stop it after it has started.     · If you think your pain medicine is making you sick to your stomach:  ? Take your medicine after meals (unless your doctor has told you not to). ? Ask your doctor for a different pain medicine.     · If your doctor prescribed antibiotics, take them as directed. Do not stop taking them just because you feel better. You need to take the full course of antibiotics. Incision care    · If your doctor told you how to care for your cut (incision), follow your doctor's instructions. You will have a dressing over the cut. A dressing helps the incision heal and protects it. Your doctor will tell you how to take care of this.     · If you did not get instructions, follow this general advice:  ? If you have strips of tape on the cut the doctor made, leave the tape on for a week or until it falls off.  ? If you have stitches or staples, your doctor will tell you when to come back to have them removed. ? If you have skin glue on the cut, leave it on until it falls off. Skin glue is also called skin adhesive or liquid stitches. ? Change the bandage every day. ? Wash the area daily with warm water, and pat it dry. Don't use hydrogen peroxide or alcohol. They can slow healing.   ? You may cover the area with a gauze bandage if it oozes fluid or rubs against clothing. ? You may shower 24 to 48 hours after surgery. Pat the incision dry. Don't swim or take a bath for the first 2 weeks, or until your doctor tells you it is okay. Exercise    · Your rehab program will give you a number of exercises to do to help you get back your knee's range of motion and strength. Always do them as your therapist tells you. Ice    · For pain and swelling, put ice or a cold pack on the area for 10 to 20 minutes at a time. Put a thin cloth between the ice and your skin. If your doctor recommended cold therapy using a portable machine, follow the instructions that came with the machine. Other instructions    · Wear compression stockings if your doctor told you to. These may help to prevent blood clots. Your doctor will tell you how long you need to keep wearing the compression stockings.     · Carry a medical alert card that says you have an artificial joint. You have metal pieces in your knee. These may set off some airport metal detectors. Follow-up care is a key part of your treatment and safety. Be sure to make and go to all appointments, and call your doctor if you are having problems. It's also a good idea to know your test results and keep a list of the medicines you take. When should you call for help? Call 911 anytime you think you may need emergency care. For example, call if:    · You passed out (lost consciousness).     · You have severe trouble breathing.     · You have sudden chest pain and shortness of breath, or you cough up blood. Call your doctor now or seek immediate medical care if:    · You have signs of infection, such as:  ? Increased pain, swelling, warmth, or redness. ? Red streaks leading from the incision. ? Pus draining from the incision. ? A fever.     · You have signs of a blood clot, such as:  ? Pain in your calf, back of the knee, thigh, or groin. ?  Redness and swelling in your leg or groin.     · Your incision comes open and begins to bleed, or the bleeding increases.     · You have pain that does not get better after you take pain medicine. Watch closely for changes in your health, and be sure to contact your doctor if:    · You do not have a bowel movement after taking a laxative. Where can you learn more? Go to http://www.gray.com/  Enter T054 in the search box to learn more about \"Total Knee Replacement: What to Expect at Home. \"  Current as of: July 1, 2021               Content Version: 13.2  © 7291-0431 [employer]. Care instructions adapted under license by [employer] (which disclaims liability or warranty for this information). If you have questions about a medical condition or this instruction, always ask your healthcare professional. [place] [address] any warranty or liability for your use of this information. Information obtained by :  I understand that if any problems occur once I am at home I am to contact my physician. I understand and acknowledge receipt of the instructions indicated above.                                                                                                                                            Physician's or R.N.'s Signature                                                                  Date/Time                                                                                                                                              Patient or Representative Signature                                                          Date/Time

## 2022-04-14 NOTE — PROGRESS NOTES
Problem: Self Care Deficits Care Plan (Adult)  Goal: *Acute Goals and Plan of Care (Insert Text)  Note: GOALS: ALL GOALS MET 4/14/22    DISCHARGE GOALS (in preparation for going home/rehab):  3 days  1. Ms. Robert Westbrook will perform one lower body dressing activity with minimal assistance required to demonstrate improved functional mobility and safety. 2.  Ms. Robert Westbrook will perform one lower body bathing activity with minimal assistance required to demonstrate improved functional mobility and safety. 3.  Ms. Robert Westbrook will perform toileting/toilet transfer with contact guard assistance to demonstrate improved functional mobility and safety. 4.  Ms. Robert Westbrook will perform shower transfer with contact guard assistance to demonstrate improved functional mobility and safety. JOINT CAMP OCCUPATIONAL THERAPY TKA: Daily Note, Discharge and AM 4/14/2022  OUTPATIENT: Hospital Day: 2  [place] / [place] / Product Type: Medicare /      NAME/AGE/GENDER: Joao Sales is a 77 y.o. female   PRIMARY DIAGNOSIS:  Primary osteoarthritis of right knee [M17.11]   Procedure(s) and Anesthesia Type:     * RIGHT KNEE ARTHROPLASTY TOTAL ROBOTIC ASSISTED TANYA MILDRED SPINAL/ADDUCTORSDD - Spinal (Right)  ICD-10: Treatment Diagnosis:    · Pain in Right Knee (M25.561)  · Stiffness of Right Knee, Not elsewhere classified (M25.661)  · Other lack of cordination (R27.8)  · Difficulty in walking, Not elsewhere classified (R26.2)  · Other abnormalities of gait and mobility (R26.89)      ASSESSMENT:     Ms. Robert Westbrook is s/p right TKA and presents with decreased weight bearing on right LE and decreased independence with functional mobility and activities of daily living as compared to baseline level of function and safety. Patient would benefit from skilled Occupational Therapy to maximize independence and safety with self-care task and functional mobility.   Pt would also benefit from education on adaptive equipment and safety precautions in preparation for going home. Earlier OT assisted patient onto bed pan. She was up on the bsc. She donned a clean taped brief. She did sit to stand multiple time to empty her bladder with CGA/min assist. SHe took steps to recliner with min assist. She was set up in recliner and washed her hands. She ambulated with PT in the room with min/CGA. She plans to spend the night and go home tomorrow. Will see in am for Full ADL session. 4/14/22: Pt was able to perform functional household mobility with CGA and RW. She performed dressing, bathing, toileting and grooming with assistance as charted below. She became slightly lightheaded. /64. She plans to have support from spouse and should progress well with continued therapy once home. Discharge acute OT as all goals have been met. This section established at most recent assessment   PROBLEM LIST (Impairments causing functional limitations):  1. Decreased Strength  2. Decreased ADL/Functional Activities  3. Decreased Transfer Abilities  4. Increased Pain  5. Increased Fatigue  6. Decreased Flexibility/Joint Mobility  7. Decreased Knowledge of Precautions   INTERVENTIONS PLANNED: (Benefits and precautions of occupational therapy have been discussed with the patient.)  1. Activities of daily living training  2. Adaptive equipment training  3. Balance training  4. Clothing management  5. Donning&doffing training  6. Theraputic activity     TREATMENT PLAN: Frequency/Duration: Follow patient 1-2 times to address above goals. Rehabilitation Potential For Stated Goals: Good     RECOMMENDED REHABILITATION/EQUIPMENT: (at time of discharge pending progress): Continue Skilled Therapy. OCCUPATIONAL PROFILE AND HISTORY:   History of Present Injury/Illness (Reason for Referral): Pt presents this date s/p (right) TKA.     Past Medical History/Comorbidities:   Ms. Sinan Santiago  has a past medical history of Arthritis, GERD (gastroesophageal reflux disease), Hypertension, Iron deficiency anemia, Osteoarthritis, and Vertigo. Ms. Alisia Ch  has a past surgical history that includes hx hysterectomy (1995); hx tonsillectomy; hx colonoscopy; and hx orthopaedic (Left, 02/2020).   Social History/Living Environment:   Home Environment: Private residence  # Steps to Enter: 10  Rails to Enter: Yes  Hand Rails : Right  One/Two Story Residence: One story  Living Alone: No  Support Systems: Spouse/Significant Other  Patient Expects to be Discharged to[de-identified] Home with family assistance  Current DME Used/Available at Home: None  Tub or Shower Type: Shower    Prior Level of Function/Work/Activity:  Mod I     Number of Personal Factors/Comorbidities that affect the Plan of Care: Brief history (0):  LOW COMPLEXITY   ASSESSMENT OF OCCUPATIONAL PERFORMANCE[de-identified]   Most Recent Physical Functioning:   Balance  Sitting: Intact  Standing: With support                              Mental Status  Neurologic State: Alert  Orientation Level: Oriented X4  Cognition: Appropriate decision making  Perception: Appears intact  Perseveration: No perseveration noted  Safety/Judgement: Awareness of environment          RLE AROM  R Knee Flexion: 91  R Knee Extension: 5     Basic ADLs (From Assessment) Complex ADLs (From Assessment)   Basic ADL  Feeding: Independent  Oral Facial Hygiene/Grooming: Supervision  Bathing: Minimum assistance,Moderate assistance  Type of Bath: Chlorhexidine (CHG),Full,Shower  Upper Body Dressing: Supervision  Lower Body Dressing: Minimum assistance,Moderate assistance  Toileting: Minimum assistance     Grooming/Bathing/Dressing Activities of Daily Living   Grooming  Grooming Assistance: Supervision  Position Performed: Standing Cognitive Retraining  Safety/Judgement: Awareness of environment   Upper Body Bathing  Bathing Assistance: Set-up     Lower Body Bathing  Bathing Assistance: Set-up     Upper Body Dressing Assistance  Dressing Assistance: Supervision Functional Transfers  Bathroom Mobility: Contact guard assistance  Toilet Transfer : Stand-by assistance  Shower Transfer: Contact guard assistance   Lower Body Dressing Assistance  Dressing Assistance: Stand-by assistance  Socks: Minimum assistance Bed/Mat Mobility  Supine to Sit: Contact guard assistance  Sit to Stand: Stand-by assistance  Stand to Sit: Stand-by assistance  Bed to Chair: Contact guard assistance  Scooting: Contact guard assistance         Physical Skills Involved:  1. Balance  2. Strength  3. Activity Tolerance Cognitive Skills Affected (resulting in the inability to perform in a timely and safe manner): 1. none Psychosocial Skills Affected:  1. Habits/Routines  2. Environmental Adaptation  3. Self-Awareness   Number of elements that affect the Plan of Care: 5+:  HIGH COMPLEXITY   CLINICAL DECISION MAKIN[address] [place], [place] [id number] AM-PAC 6 Clicks   Daily Activity Inpatient Short Form  How much help from another person does the patient currently need. .. Total A Lot A Little None   1. Putting on and taking off regular lower body clothing? [] 1   [] 2   [x] 3   [] 4   2. Bathing (including washing, rinsing, drying)? [] 1   [] 2   [x] 3   [] 4   3. Toileting, which includes using toilet, bedpan or urinal?   [] 1   [] 2   [x] 3   [] 4   4. Putting on and taking off regular upper body clothing? [] 1   [] 2   [] 3   [x] 4   5. Taking care of personal grooming such as brushing teeth? [] 1   [] 2   [] 3   [x] 4   6. Eating meals? [] 1   [] 2   [] 3   [x] 4   © , Trustees of [address] [place], [place] [address], under license to [employer]. All rights reserved     Score:  Initial: 19 Most Recent: 21 (Date: 22  )    Interpretation of Tool:  Represents activities that are increasingly more difficult (i.e. Bed mobility, Transfers, Gait).    Medical Necessity:     · Patient is expected to demonstrate progress in   · Self care skills and functional mobility  ·     Reason for Services/Other Comments:  · Patient continues to require skilled intervention due to   · Above listed deficits     Use of outcome tool(s) and clinical judgement create a POC that gives a: LOW COMPLEXITY            TREATMENT:   (In addition to Assessment/Re-Assessment sessions the following treatments were rendered)     Pre-treatment Symptoms/Complaints:    Pain: Initial:   Pain Intensity 1: 3  Post Session:  2/10 rest     Self Care: (45): Procedure(s) (per grid) utilized to improve and/or restore self-care/home management as related to dressing, bathing, toileting, grooming and functional transfers. Required minimal visual, verbal, manual, and tactile cueing to facilitate activities of daily living skills and compensatory activities. Treatment/Session Assessment:     Response to Treatment:  tolerated well. Education:  [] Home Exercises  [x] Fall Precautions  [] Hip Precautions [] Going Home Video  [x] Knee/Hip Prosthesis Review  [x] Walker Management/Safety [x] Adaptive Equipment as Needed       Interdisciplinary Collaboration:   o Physical Therapist  o Occupational Therapist  o Registered Nurse    After treatment position/precautions:   o Up in chair  o Bed/Chair-wheels locked  o Caregiver at bedside  o Call light within reach  o RN notified     Compliance with Program/Exercises: Will assess as treatment progresses. Recommendations/Intent for next treatment session:  Treatment next visit will focus on increasing Ms. Fair's independence with bed mobility, transfers, self care, functional mobility, modalities for pain, and patient education.       Total Treatment Duration:25  OT Patient Time In/Time Out  Time In: 1100  Time Out: Steven Sky

## 2022-04-14 NOTE — PROGRESS NOTES
2022         Post Op day: 1 Day Post-Op      Admit Date: 2022  Admit Diagnosis: Primary osteoarthritis of right knee [M17.11]; Osteoarthritis of right knee [M17.11]    LAB:    Recent Results (from the past 24 hour(s))   HEMOGLOBIN    Collection Time: 22  7:50 PM   Result Value Ref Range    HGB 10.6 (L) 11.7 - 15.4 g/dL   HEMOGLOBIN    Collection Time: 22  4:59 AM   Result Value Ref Range    HGB 9.9 (L) 11.7 - 15.4 g/dL     Vital Signs:    Patient Vitals for the past 8 hrs:   BP Temp Pulse Resp SpO2   22 0410 116/67 97.6 °F (36.4 °C) 70 16 100 %   22 2356 121/73 97.7 °F (36.5 °C) 62 16 99 %     Temp (24hrs), Av.9 °F (36.6 °C), Min:97.6 °F (36.4 °C), Max:98.7 °F (37.1 °C)    Body mass index is 32.91 kg/m².   Pain Control:   Pain Assessment  Pain Scale 1: Numeric (0 - 10)  Pain Intensity 1: 5  Pain Onset 1: post op  Pain Location 1: Knee  Pain Orientation 1: Right  Pain Description 1: Aching  Pain Intervention(s) 1: Medication (see MAR)    Subjective: Doing well, No complaints, No SOB, No Chest Pain, No nausea or vomiting     Objective: Vital Signs are Stable, No Acute Distress, Alert and Oriented, Dressing is dry,  Neurovascular exam is normal.       PT/OT:            Assistive Device: Walker (comment)                Wieght Bearing Status: WBAT    Meds:    Current Facility-Administered Medications:     ferrous sulfate tablet 325 mg, 1 Tablet, Oral, ACB, Dolores Quentin, PA, 325 mg at 22 0555    lisinopril-hydroCHLOROthiazide (PRINZIDE, ZESTORETIC) 20-12.5 mg per tablet 1 Tablet, 1 Tablet, Oral, DAILY, Dolores Quentin, PA    methocarbamoL (ROBAXIN) tablet 750 mg, 750 mg, Oral, TID, Dolores Quentin, PA, 750 mg at 04/13/22 2122    pantoprazole (PROTONIX) tablet 40 mg, 40 mg, Oral, ACB, Dolores Faustt, PA, 40 mg at 22 0555    psyllium husk-aspartame (METAMUCIL FIBER) packet 1 Packet, 1 Packet, Oral, DAILY PRN, Dolores Saavedra, PA    alcohol 62% (NOZIN) nasal  1 Ampule, 1 Ampule, Topical, Q12H, Manolo, [address]    0.9% sodium chloride infusion, 100 mL/hr, IntraVENous, CONTINUOUS, Manolo, [address]    sodium chloride (NS) flush 5-40 mL, 5-40 mL, IntraVENous, Q8H, GHADA Lama, [address]    sodium chloride (NS) flush 5-40 mL, 5-40 mL, IntraVENous, PRN, JOANA French    acetaminophen (TYLENOL) tablet 1,000 mg, 1,000 mg, Oral, Q6H, Manolo, PA, 1,000 mg at 04/14/22 0410    celecoxib (CELEBREX) capsule 200 mg, 200 mg, Oral, Q12H, Manolo, PA, 200 mg at 04/13/22 2122    oxyCODONE IR (ROXICODONE) tablet 10 mg, 10 mg, Oral, Q4H PRN, JOANA French, 10 mg at 04/14/22 0424    HYDROmorphone (DILAUDID) injection 1 mg, 1 mg, IntraVENous, Q3H PRN, JOANA French    naloxone [facility]) injection 0.2-0.4 mg, 0.2-0.4 mg, IntraVENous, Q10MIN PRN, JOANA French    dexamethasone (DECADRON) 10 mg/mL injection 10 mg, 10 mg, IntraVENous, ONCE, JOANA French    ondansetron (ZOFRAN ODT) tablet 4 mg, 4 mg, Oral, Q4H PRN, JOANA French    diphenhydrAMINE (BENADRYL) capsule 25 mg, 25 mg, Oral, Q4H PRN, JOANA French    senna-docusate (PERICOLACE) 8.6-50 mg per tablet 2 Tablet, 2 Tablet, Oral, DAILY, JOANA French    aspirin delayed-release tablet 81 mg, 81 mg, Oral, Q12H, JOANA French, 81 mg at 04/13/22 2122     Assessment:   Patient Active Problem List   Diagnosis Code    Precordial pain R07.2    Essential hypertension I10    Family history of ischemic heart disease (IHD) Z82.49    Snoring R06.83    Osteoarthritis of right knee M17.11    Status post right knee replacement Z96.651             Plan:  Continue Physical Therapy, Monitor labs, home today        Signed By: Dani Diego MD

## 2022-04-14 NOTE — PROGRESS NOTES
Problem: Mobility Impaired (Adult and Pediatric)  Goal: *Acute Goals and Plan of Care (Insert Text)  Outcome: Progressing Towards Goal  Note: GOALS (1-4 days):  (1.)Ms. Mandi Hernandez will move from supine to sit and sit to supine  in bed with STAND BY ASSIST.  (2.)Ms. Mandi Hernandez will transfer from bed to chair and chair to bed with STAND BY ASSIST using the least restrictive device. (3.)Ms. Mandi Hernandez will ambulate with STAND BY ASSIST for 200 feet with the least restrictive device. (4.)Ms. Mandi Hernandez will ambulate up/down 10 steps with right railing with CONTACT GUARD ASSIST with cane. (5.)Ms. Mandi Hernandez will increase right knee ROM to 0°-90°.  ________________________________________________________________________________________________        PHYSICAL THERAPY JOINT CAMP TKA: Daily Note and AM 4/14/2022  OUTPATIENT: Hospital Day: 2  [place] / [place] B / Product Type: Medicare /      NAME/AGE/GENDER: Rosangela Caldwell is a 77 y.o. female   PRIMARY DIAGNOSIS:  Primary osteoarthritis of right knee [M17.11]   Procedure(s) and Anesthesia Type:     * RIGHT KNEE ARTHROPLASTY TOTAL ROBOTIC ASSISTED TANYA MILDRED SPINAL/ADDUCTOR-Spinal (Right)  ICD-10: Treatment Diagnosis:    · Pain in Right Knee (M25.561)  · Stiffness of Right Knee, Not elsewhere classified (M25.661)  · Difficulty in walking, Not elsewhere classified (R26.2)      ASSESSMENT:     Ms. Mandi Hernandez presents with decreased strength and range of motion right lower extremity and with decreased independence with functional mobility s/p right TKA. Pt will benefit from skilled PT interventions to maximize independence with functional mobility and TKA management. Pt plans to discharge to her home with her  and with continued therapy for follow up. This am, we review her PT HEP, PT DC Instructions and Use of Cryocuff. She performs her R TKA exs in the bed. ROM 5-91. She then performs bed mobility and sit to stand with CGA. She c/o slight dizziness.  As she ambulates a bit further, she asks to sit down. I then instruct stair climbing once she feels better. After this, she feels dizzy and again and nauseous. I took her back to her room, got her a cold rag for her forehead. RN aware and getting nausea meds. I left a ginger ale with her. Call bell in reach. Planning on going home today. Will see how she feels after her ADL. This section established at most recent assessment   PROBLEM LIST (Impairments causing functional limitations):  1. Decreased Strength  2. Decreased ADL/Functional Activities  3. Decreased Transfer Abilities  4. Decreased Ambulation Ability/Technique  5. Decreased Flexibility/Joint Mobility  6. Edema/Girth  7. Decreased Stanley with Home Exercise Program   INTERVENTIONS PLANNED: (Benefits and precautions of physical therapy have been discussed with the patient.)  1. Bed Mobility  2. Cold  3. Gait Training  4. Home Exercise Program (HEP)  5. Range of Motion (ROM)  6. Therapeutic Activites  7. Therapeutic Exercise/Strengthening  8. Transfer Training     TREATMENT PLAN: Frequency/Duration: Follow patient BID for duration of hospital stay to address above goals. Rehabilitation Potential For Stated Goals: Excellent     RECOMMENDED REHABILITATION/EQUIPMENT: (at time of discharge pending progress): Continue Skilled Therapy and Home Health: Physical Therapy. HISTORY:   History of Present Injury/Illness (Reason for Referral):  Pt s/p total knee arthroplasty on R LE  Past Medical History/Comorbidities:   Ms. Garth Brown  has a past medical history of Arthritis, GERD (gastroesophageal reflux disease), Hypertension, Iron deficiency anemia, Osteoarthritis, and Vertigo. Ms. Garth Brown  has a past surgical history that includes hx hysterectomy (1995); hx tonsillectomy; hx colonoscopy; and hx orthopaedic (Left, 02/2020).   Social History/Living Environment:   Home Environment: Private residence  # Steps to Enter: 10  Rails to Enter: Yes  [employer] : Right  One/Two Story Residence: One story  Living Alone: No  Support Systems: Spouse/Significant Other  Patient Expects to be Discharged to[de-identified] Home with home health  Current DME Used/Available at Home: None  Tub or Shower Type: Shower  Prior Level of Function/Work/Activity:  Independent prior to admit. Lives with her . Number of Personal Factors/Comorbidities that affect the Plan of Care: 0: LOW COMPLEXITY   EXAMINATION:   Most Recent Physical Functioning:               RLE AROM  R Knee Flexion: 91  R Knee Extension: 5            Bed Mobility  Supine to Sit: Contact guard assistance  Scooting: Contact guard assistance    Transfers  Sit to Stand: Contact guard assistance  Stand to Sit: Contact guard assistance  Bed to Chair: Contact guard assistance    Balance  Sitting: Intact  Standing: With support              Weight Bearing Status  Right Side Weight Bearing: As tolerated  Distance (ft): 50 Feet (ft)  Ambulation - Level of Assistance: Contact guard assistance  Assistive Device: Walker, rolling  Speed/Katia: Pace decreased (<100 feet/min)  Step Length: Left shortened  Stance: Right decreased  Gait Abnormalities: Antalgic  Stairs - Level of Assistance:  (up/down 5 steps with rails and close CGA)  Interventions: Safety awareness training;Verbal cues     Braces/Orthotics: none    Right Knee Cold  Type: Cryocuff      Body Structures Involved:  1. Joints  2. Muscles Body Functions Affected:  1. Movement Related Activities and Participation Affected:  1. Mobility  2. Self Care   Number of elements that affect the Plan of Care: 4+: HIGH COMPLEXITY   CLINICAL PRESENTATION:   Presentation: Stable and uncomplicated: LOW COMPLEXITY   CLINICAL DECISION MAKIN [facility] [address] [id number] AM-PAC 6 Clicks   Basic Mobility Inpatient Short Form  How much difficulty does the patient currently have. .. Unable A Lot A Little None   1. Turning over in bed (including adjusting bedclothes, sheets and blankets)? [] 1   [] 2   [x] 3   [] 4   2. Sitting down on and standing up from a chair with arms ( e.g., wheelchair, bedside commode, etc.)   [] 1   [] 2   [x] 3   [] 4   3. Moving from lying on back to sitting on the side of the bed? [] 1   [] 2   [x] 3   [] 4   How much help from another person does the patient currently need. .. Total A Lot A Little None   4. Moving to and from a bed to a chair (including a wheelchair)? [] 1   [] 2   [x] 3   [] 4   5. Need to walk in hospital room? [] 1   [] 2   [x] 3   [] 4   6. Climbing 3-5 steps with a railing? [] 1   [] 2   [x] 3   [] 4   © 2007, Trustees of [address] [place] [address] [address] [address], under license to [employer]. All rights reserved     Score:  Initial: 18 Most Recent: X (Date: -- )    Interpretation of Tool:  Represents activities that are increasingly more difficult (i.e. Bed mobility, Transfers, Gait). Medical Necessity:     · Patient is expected to demonstrate progress in   · strength, range of motion, and functional technique  ·  to   · decrease assistance required with functional mobility and TKA managment  · .  Reason for Services/Other Comments:  · Patient continues to require skilled intervention due to   · Inability to complete functional mobility and TKA management independently  · .    Use of outcome tool(s) and clinical judgement create a POC that gives a: Clear prediction of patient's progress: LOW COMPLEXITY            TREATMENT:   (In addition to Assessment/Re-Assessment sessions the following treatments were rendered)     Pre-treatment Symptoms/Complaints:  Dizziness and nausea when up  Pain Initial:   Pain Intensity 1: 3  Pain Location 1: Knee  Pain Orientation 1: Right  Pain Intervention(s) 1: Ambulation/Increased Activity,Cold pack,Elevation,Exercise,Repositioned  Post Session:  1, applied cryocuff and elevated LEs     Therapeutic Activity: (  30 Minutes ):  Therapeutic activities including bed mobility, Chair transfers, Ambulation on level ground and stairs, instruction in use of cryocuff, PT DC instructions and LE ther ex to improve mobility, strength, and balance. Required minimal Safety awareness training;Verbal cues to promote dynamic balance in standing and insure safe technique. ASSESSMENT TODAY    Date:  4/13 Date:  4/14/22 Date:     ACTIVITY/EXERCISE AM PM AM PM AM PM     []  []  []  []  []  []   Ankle Pumps  15 20      Quad Sets  15 20      Gluteal Sets  15 20      Hip ABd/ADduction  15 20      Straight Leg Raises  15 20AA      Knee Slides  15 20AA      Short Arc Quads  15 20      Chair Slides   20                        B = bilateral; AA = active assistive; A = active; P = passive      Treatment/Session Assessment:     Response to Treatment:  became dizzy and nauseous with ambulation. She thinks it's her pain meds     Education:  [x] Home Exercises  [x] Fall Precautions  [x] Use of Cold Therapy Unit [x] D/C Instruction Review  [] Knee Prosthesis Review  [x] Walker Management/Safety [] Adaptive Equipment as Needed  [x] No pillow under knee       Interdisciplinary Collaboration:   o Physical Therapist  o Occupational Therapist  o Registered Nurse    After treatment position/precautions:   o Up in chair  o Bed/Chair-wheels locked  o Bed in low position  o Call light within reach  o RN notified    Compliance with Program/Exercises: Compliant all of the time. Recommendations/Intent for next treatment session:  Treatment next visit will focus on increasing Ms. Crockett's independence with bed mobility, transfers, gait training, strength/ROM exercises, modalities for pain, and patient education.  Probable DC home later today with Aditya Sosa PT to follow     Total Treatment Duration:  PT Patient Time In/Time Out  Time In: 1000  Time Out: [address] [place], [place]

## 2022-04-14 NOTE — PROGRESS NOTES
04/13/22 2056   Oxygen Therapy   O2 Sat (%) 100 %   Pulse via Oximetry 78 beats per minute   O2 Device None (Room air)   Pt on continuous monitor for HS. Alarm limits set. Pt working on IS.

## 2022-04-16 ENCOUNTER — HOME CARE VISIT (OUTPATIENT)
Dept: SCHEDULING | Facility: HOME HEALTH | Age: 67
End: 2022-04-16
Payer: MEDICARE

## 2022-04-16 VITALS
TEMPERATURE: 97.5 F | DIASTOLIC BLOOD PRESSURE: 62 MMHG | SYSTOLIC BLOOD PRESSURE: 124 MMHG | RESPIRATION RATE: 18 BRPM | OXYGEN SATURATION: 100 % | HEART RATE: 80 BPM

## 2022-04-16 PROCEDURE — 400018 HH-NO PAY CLAIM PROCEDURE

## 2022-04-16 PROCEDURE — 3331090001 HH PPS REVENUE CREDIT

## 2022-04-16 PROCEDURE — 3331090002 HH PPS REVENUE DEBIT

## 2022-04-16 PROCEDURE — 400013 HH SOC

## 2022-04-16 PROCEDURE — G0151 HHCP-SERV OF PT,EA 15 MIN: HCPCS

## 2022-04-16 NOTE — Clinical Note
Please confirm appointment with patient, thank you.     ----- Message -----  From: Vargas Arguetaion: 4/17/2022   9:09 AM EDT  To: Watson Xiong  Subject: OP PT APPT                                         Please confirm with pt  5/3/22 @11am.  Please arrive [address]  Thank you  ----- Message -----  From: Cong Sands  Sent: 4/16/2022   2:37 PM EDT  To: Andrae 33    Patient requests to attend OP PT at [place] [address]. Please contact her to schedule for OP following HH PT DC planned for 4/29/22.

## 2022-04-16 NOTE — CASE COMMUNICATION
Patient requests to attend OP PT at [place] [address]. Please contact her to schedule for OP following HH PT DC planned for 4/29/22.

## 2022-04-16 NOTE — Clinical Note
S: 73yo female s/p MILDRED R TKA on 04/13/2022 by Dr. Cristina Juan, skin closed using staples; WBAT RLE.   B: PMH: OA, GERD, HTN, Iron deficiency anemia, Vertigo, HYSTERECTOMY, LEFT 3RD TOE CORRECTION OF DEFORMED TOE  A: Patient lives with spouse in single story home with x4 steps with railing to enter. PLOF: Independent in home and short community without AD. Patient utilized electric scooter in grocery store; independent with bathing, dressing, driving. Patient goal: Walk in home and community without AD. R knee AROM measured in seated, closed chain position: 82 degrees flexion to 14 degrees extension. Aquacel at R knee clean/dry/intact. Patient is managing pain with Oxycodone and ice. Patient will benefit from skilled [facility] PT intervention for strength, posture, balance, gait, step maneuvers, transfers, wound care, pt/CG education, edema management, and pain management. Pt agrees to PT POC. R: 1w1, 3w1, 2w1.

## 2022-04-17 PROCEDURE — 3331090002 HH PPS REVENUE DEBIT

## 2022-04-17 PROCEDURE — 3331090001 HH PPS REVENUE CREDIT

## 2022-04-18 ENCOUNTER — HOME CARE VISIT (OUTPATIENT)
Dept: SCHEDULING | Facility: HOME HEALTH | Age: 67
End: 2022-04-18
Payer: MEDICARE

## 2022-04-18 VITALS
DIASTOLIC BLOOD PRESSURE: 74 MMHG | TEMPERATURE: 98 F | RESPIRATION RATE: 17 BRPM | SYSTOLIC BLOOD PRESSURE: 128 MMHG | HEART RATE: 80 BPM | OXYGEN SATURATION: 98 %

## 2022-04-18 PROCEDURE — 3331090001 HH PPS REVENUE CREDIT

## 2022-04-18 PROCEDURE — G0157 HHC PT ASSISTANT EA 15: HCPCS

## 2022-04-18 PROCEDURE — 3331090002 HH PPS REVENUE DEBIT

## 2022-04-19 PROCEDURE — 3331090002 HH PPS REVENUE DEBIT

## 2022-04-19 PROCEDURE — 3331090001 HH PPS REVENUE CREDIT

## 2022-04-20 ENCOUNTER — HOME CARE VISIT (OUTPATIENT)
Dept: SCHEDULING | Facility: HOME HEALTH | Age: 67
End: 2022-04-20
Payer: MEDICARE

## 2022-04-20 VITALS
HEART RATE: 84 BPM | DIASTOLIC BLOOD PRESSURE: 74 MMHG | TEMPERATURE: 97.7 F | RESPIRATION RATE: 17 BRPM | SYSTOLIC BLOOD PRESSURE: 118 MMHG | OXYGEN SATURATION: 97 %

## 2022-04-20 PROCEDURE — 3331090002 HH PPS REVENUE DEBIT

## 2022-04-20 PROCEDURE — 3331090001 HH PPS REVENUE CREDIT

## 2022-04-20 PROCEDURE — G0157 HHC PT ASSISTANT EA 15: HCPCS

## 2022-04-21 PROCEDURE — 3331090002 HH PPS REVENUE DEBIT

## 2022-04-21 PROCEDURE — 3331090001 HH PPS REVENUE CREDIT

## 2022-04-22 ENCOUNTER — HOME CARE VISIT (OUTPATIENT)
Dept: SCHEDULING | Facility: HOME HEALTH | Age: 67
End: 2022-04-22
Payer: MEDICARE

## 2022-04-22 VITALS
RESPIRATION RATE: 17 BRPM | DIASTOLIC BLOOD PRESSURE: 62 MMHG | HEART RATE: 80 BPM | TEMPERATURE: 98.2 F | SYSTOLIC BLOOD PRESSURE: 108 MMHG | OXYGEN SATURATION: 98 %

## 2022-04-22 PROCEDURE — G0157 HHC PT ASSISTANT EA 15: HCPCS

## 2022-04-22 PROCEDURE — 3331090001 HH PPS REVENUE CREDIT

## 2022-04-22 PROCEDURE — 3331090002 HH PPS REVENUE DEBIT

## 2022-04-23 PROCEDURE — 3331090002 HH PPS REVENUE DEBIT

## 2022-04-23 PROCEDURE — 3331090001 HH PPS REVENUE CREDIT

## 2022-04-24 PROCEDURE — 3331090002 HH PPS REVENUE DEBIT

## 2022-04-24 PROCEDURE — 3331090001 HH PPS REVENUE CREDIT

## 2022-04-25 PROCEDURE — 3331090002 HH PPS REVENUE DEBIT

## 2022-04-25 PROCEDURE — 3331090001 HH PPS REVENUE CREDIT

## 2022-04-26 ENCOUNTER — HOME CARE VISIT (OUTPATIENT)
Dept: SCHEDULING | Facility: HOME HEALTH | Age: 67
End: 2022-04-26
Payer: MEDICARE

## 2022-04-26 ENCOUNTER — HOME CARE VISIT (OUTPATIENT)
Dept: HOME HEALTH SERVICES | Facility: HOME HEALTH | Age: 67
End: 2022-04-26
Payer: MEDICARE

## 2022-04-26 VITALS
TEMPERATURE: 98 F | SYSTOLIC BLOOD PRESSURE: 122 MMHG | HEART RATE: 84 BPM | RESPIRATION RATE: 17 BRPM | OXYGEN SATURATION: 98 % | DIASTOLIC BLOOD PRESSURE: 68 MMHG

## 2022-04-26 PROCEDURE — 3331090001 HH PPS REVENUE CREDIT

## 2022-04-26 PROCEDURE — 3331090002 HH PPS REVENUE DEBIT

## 2022-04-26 PROCEDURE — G0157 HHC PT ASSISTANT EA 15: HCPCS

## 2022-04-27 PROCEDURE — 3331090001 HH PPS REVENUE CREDIT

## 2022-04-27 PROCEDURE — 3331090002 HH PPS REVENUE DEBIT

## 2022-04-28 PROCEDURE — 3331090001 HH PPS REVENUE CREDIT

## 2022-04-28 PROCEDURE — 3331090002 HH PPS REVENUE DEBIT

## 2022-04-29 ENCOUNTER — HOME CARE VISIT (OUTPATIENT)
Dept: SCHEDULING | Facility: HOME HEALTH | Age: 67
End: 2022-04-29
Payer: MEDICARE

## 2022-04-29 PROCEDURE — G0151 HHCP-SERV OF PT,EA 15 MIN: HCPCS

## 2022-04-29 PROCEDURE — 3331090001 HH PPS REVENUE CREDIT

## 2022-04-29 PROCEDURE — 3331090002 HH PPS REVENUE DEBIT

## 2022-04-30 VITALS
TEMPERATURE: 97.8 F | SYSTOLIC BLOOD PRESSURE: 140 MMHG | DIASTOLIC BLOOD PRESSURE: 82 MMHG | RESPIRATION RATE: 18 BRPM | HEART RATE: 78 BPM | OXYGEN SATURATION: 98 %

## 2022-04-30 PROCEDURE — 3331090002 HH PPS REVENUE DEBIT

## 2022-04-30 PROCEDURE — 3331090001 HH PPS REVENUE CREDIT

## 2022-05-01 PROCEDURE — 3331090002 HH PPS REVENUE DEBIT

## 2022-05-01 PROCEDURE — 3331090001 HH PPS REVENUE CREDIT

## 2022-05-24 ENCOUNTER — OFFICE VISIT (OUTPATIENT)
Dept: ORTHOPEDIC SURGERY | Age: 67
End: 2022-05-24
Payer: MEDICARE

## 2022-05-24 DIAGNOSIS — R26.2 DIFFICULTY IN WALKING: Primary | ICD-10-CM

## 2022-05-24 DIAGNOSIS — Z74.09 DECREASED FUNCTIONAL MOBILITY AND ENDURANCE: ICD-10-CM

## 2022-05-24 DIAGNOSIS — M25.561 ACUTE PAIN OF RIGHT KNEE: ICD-10-CM

## 2022-05-24 DIAGNOSIS — R26.89 UNSTABLE BALANCE: ICD-10-CM

## 2022-05-24 DIAGNOSIS — M25.661 KNEE STIFFNESS, RIGHT: ICD-10-CM

## 2022-05-24 DIAGNOSIS — R53.1 WEAKNESS GENERALIZED: ICD-10-CM

## 2022-05-24 PROCEDURE — 97016 VASOPNEUMATIC DEVICE THERAPY: CPT | Performed by: PHYSICAL THERAPIST

## 2022-05-24 PROCEDURE — 97110 THERAPEUTIC EXERCISES: CPT | Performed by: PHYSICAL THERAPIST

## 2022-05-24 NOTE — PROGRESS NOTES
[address] [address] [phone]  Physical Therapy Daily Note      Referring MD: Liana Coyle MD  Diagnosis:     ICD-10-CM ICD-9-CM    1. Difficulty in walking  R26.2 719.7    2. Weakness generalized  R53.1 780.79    3. Decreased functional mobility and endurance  Z74.09 780.99    4. Unstable balance  R26.89 781.2    5. Acute pain of right knee  M25.561 719.46    6. Knee stiffness, right  M25.661 719.56      Surgery: Right Knee Arthroplasty Total Robotic Assisted Isaiah Shiva Spinal/adductor  DOS:  4/13/2022   Therapy precautions: Fall risk  Co-morbidities affecting plan of care: Vertigo    Total Timed Codes: 48 min, Total Treatment Time: 63 min  Time In: 02:08 PM  Time Out: 03:11 PM      Patient History    Past medical and surgical history:   Past Medical History:   Diagnosis Date    Arthritis     pinched nerve in neck    GERD (gastroesophageal reflux disease)     well controlled with omeprazole daily--    Hypertension     managed with medication     Iron deficiency anemia     takes oral Fe- denies hx of blood transfusion    Osteoarthritis     Vertigo     Meclizine PRN       Past Surgical History:   Procedure Laterality Date    HX COLONOSCOPY      HX HYSTERECTOMY  1995    HX ORTHOPAEDIC Left 02/2020    LEFT 3RD TOE CORRECTION OF DEFORMED TOE     HX TONSILLECTOMY       Medications: reviewed in chart   Allergies: Allergies   Allergen Reactions    Latex Rash    Atorvastatin Rash    Nexium [Esomeprazole Magnesium] Rash    Other Medication Rash     Silk tape    Seafood [Shellfish Containing Products] Rash        Chief complaints/history of injury: Patient is a 79 y.o. female with a PMH complicated as noted above. She presents to PT 3 weeks s/p right TKA. She has completed HHPT with no post-op complications. However notes last week she noted possible blood in her bowel movement.   States it was on one stool but not on the seconds to improve walking pace to be able to cross the street efficiently.  Patient will achieve an average score on the Patient-Specific Functional Scale ? 3/10 indicating improving functional mobility.  Patient will report pain no greater than 6/10 to improve performance of MRADLs. Short term goals to be met by 6/28/2022 (8 weeks)  · Pt. will demonstrate normal/unassisted ambulation on even terrain for home distances. · Patient will improve quadriceps strength to ? 3/5 for stair negotiation. · Pt. will be able to ascend steps with a reciprocal gait and descend with a step-to pattern safely using rails for balance. · Pt. will be able to do simple ADLs independently with min restrictions from knee including bathing, dressing and driving. · Patient will improve TUG time to ? 20 seconds to improve walking pace to be able to cross the street efficiently. · Patient will achieve an average score on the Patient-Specific Functional Scale ? 5/10 indicating improving functional mobility. · Patient will report pain no greater than 4/10 to improve performance of MRADLs    Long term goals to be met by 7/26/2022 (12 weeks)   Demonstrate AROM of involved knee to be ? 0°-110° allowing for ADLs with min to no restrictions from knee stiffness.  Patient will improve quadriceps strength to ? 3+/5 for stair negotiation.  Patient will demonstrate independent floor transfers for fall safety.  Pt. will be able to rise from commode height without having to use hands to help and weight distributed equally demonstrating improved functional strength of surgical LE.    Pt. will ascend and descend steps with a reciprocal gait pattern displaying good control and balance using rails only for balance.  Patient will achieve an average score on the Patient-Specific Functional Scale ? 7/10 indicating improving functional mobility.    Improve LEFS to ? 55/80 allowing for improved Functional Deficit of 31% (Mobility).  Discharged from PT     PLAN: Continue efforts to improve patient's quality of gait and quadriceps strength    Access Code: [email or web address]  URL: [email or web address]  Date: 05/12/2022  Prepared by: Pranav Laird DPT    Exercises  · [address] Way Patellar Barton - 3 x daily - 7 x weekly  · Seated Knee Flexion AAROM - 3 x daily - 7 x weekly - 1 sets - 5 reps - 12 seconds hold  · Seated Hamstring Stretch - 3 x daily - 7 x weekly - 1 sets - 5 reps - 12 seconds hold  · Seated Calf Stretch with Strap - 3 x daily - 7 x weekly - 1 sets - 5 reps - 12 seconds hold  · Side to Side Weight Shift with Counter Support - 4 x daily - 7 x weekly - 2 minutes duration  · Voodoo Pew - 4 x daily - 7 x weekly - 2 minutes duration  · Stride Stance Weight Shift - 4 x daily - 7 x weekly - 2 minutes duration  · Standing Hip Abduction with Counter Support - 2 x daily - 5 x weekly - 1 sets - 10 reps  · Standing Hip Extension with Counter Support - 2 x daily - 5 x weekly - 1 sets - 10 reps  · Standing March with Counter Support - 2 x daily - 5 x weekly - 1 sets - 10 reps  · Supine Knee Extension Strengthening - 2 x daily - 7 x weekly - 2 sets - 10 reps - 1-2 seconds hold  · Standing Terminal Knee Extension at Wall with Ball - 2 x daily - 7 x weekly - 3 sets - 10 reps - 1-2 seconds hold  · Ice - 2 x daily - 7 x weekly - 15-20 duration (minutes)    Patient Education  · Total Knee Replacement Handout  · Red Flags    Dawna Veronica, PTA

## 2022-05-26 ENCOUNTER — OFFICE VISIT (OUTPATIENT)
Dept: ORTHOPEDIC SURGERY | Age: 67
End: 2022-05-26
Payer: MEDICARE

## 2022-05-26 DIAGNOSIS — R26.89 UNSTABLE BALANCE: ICD-10-CM

## 2022-05-26 DIAGNOSIS — Z74.09 DECREASED FUNCTIONAL MOBILITY AND ENDURANCE: ICD-10-CM

## 2022-05-26 DIAGNOSIS — M25.661 KNEE STIFFNESS, RIGHT: ICD-10-CM

## 2022-05-26 DIAGNOSIS — R26.2 DIFFICULTY IN WALKING: Primary | ICD-10-CM

## 2022-05-26 DIAGNOSIS — M25.561 ACUTE PAIN OF RIGHT KNEE: ICD-10-CM

## 2022-05-26 DIAGNOSIS — R53.1 WEAKNESS GENERALIZED: ICD-10-CM

## 2022-05-26 PROCEDURE — 97016 VASOPNEUMATIC DEVICE THERAPY: CPT | Performed by: PHYSICAL THERAPIST

## 2022-05-26 PROCEDURE — 97110 THERAPEUTIC EXERCISES: CPT | Performed by: PHYSICAL THERAPIST

## 2022-05-26 PROCEDURE — 97530 THERAPEUTIC ACTIVITIES: CPT | Performed by: PHYSICAL THERAPIST

## 2022-05-26 NOTE — PROGRESS NOTES
[address] [address] [phone]  Physical Therapy Daily Note      Referring MD: Bc Cerda MD  Diagnosis:     ICD-10-CM ICD-9-CM    1. Difficulty in walking  R26.2 719.7    2. Weakness generalized  R53.1 780.79    3. Decreased functional mobility and endurance  Z74.09 780.99    4. Unstable balance  R26.89 781.2    5. Acute pain of right knee  M25.561 719.46    6. Knee stiffness, right  M25.661 719.56      Surgery: Right Knee Arthroplasty Total Robotic Assisted Isaiah Shiva Spinal/adductor  DOS:  4/13/2022   Therapy precautions: Fall risk  Co-morbidities affecting plan of care: Vertigo    Total Timed Codes: 40 min, Total Treatment Time: 55 min  Time In: 02:05 PM  Time Out: 03:00 PM      Patient History    Past medical and surgical history:   Past Medical History:   Diagnosis Date    Arthritis     pinched nerve in neck    GERD (gastroesophageal reflux disease)     well controlled with omeprazole daily--    Hypertension     managed with medication     Iron deficiency anemia     takes oral Fe- denies hx of blood transfusion    Osteoarthritis     Vertigo     Meclizine PRN       Past Surgical History:   Procedure Laterality Date    HX COLONOSCOPY      HX HYSTERECTOMY  1995    HX ORTHOPAEDIC Left 02/2020    LEFT 3RD TOE CORRECTION OF DEFORMED TOE     HX TONSILLECTOMY       Medications: reviewed in chart   Allergies: Allergies   Allergen Reactions    Latex Rash    Atorvastatin Rash    Nexium [Esomeprazole Magnesium] Rash    Other Medication Rash     Silk tape    Seafood [Shellfish Containing Products] Rash        Chief complaints/history of injury: Patient is a 79 y.o. female with a PMH complicated as noted above. She presents to PT 3 weeks s/p right TKA. She has completed HHPT with no post-op complications. However notes last week she noted possible blood in her bowel movement.   States it was on one stool but not on the others. She has not had another bowel movement since then. SUBJECTIVE:   Patient reports primary c/o tightness in her right knee. OBJECTIVE:   A/PROM: 5/3/2022 5/26/2022 Quality of Movement   Knee: 3°- 88°/0°- 92° 0°- 92°/106° Empty end feel       Today's treatment consisted of[de-identified]    Therapeutic exercise (39653) x 25 min to develop ROM, strength, endurance and flexibility RLE.  Slantboard Gastroc Stretch - 5x12\"   Standing heel/toe raises - 3x10   3-way hip - 3x10 (B)  · Seated HSS with heel on stool - 5x12\"  · Supine heel slides with min A x 1  · ROM assessment  · Updated HEP  · Manual flexion stretch    Therapeutic activities (10879) x 15 min using dynamic activities to improve function related to transfers and functional mobility. · Sit to stands with BUE support - 3x10  · Treadmill @ 1.2 mph, 0% incline x 6'    Vasopneumatic Compression (99419) with cold x 15 minutes: to R knee in order to reduce inflammation and swelling/joint effusion which will help improve ROM and manage pain. Pt supine with B LE elevated. ASSESSMENT: Patient required multiple standing rest breaks while walking on the treadmill. We discussed starting a walking program at home. Patient states she has a treadmill however it is not set up and it is in her basement. She states she does not feel comfortable walking in her neighborhood and appeared reluctant to ambulate in the mall or park. Patient displayed several instances of her right knee giving way during periods of SLS and left foot slap occurred with initial contact while ambulating on the treadmill. Patient achieved flexion AROM > 100° easily following manual stretching. Short term goals to be met by 5/31/2022 (4 weeks)   Patient will demonstrate good recall of HEP requiring minimal verbal cuing for proper form and technique.  Patient will achieve full extension AROM, 0°, to normalize gait pattern.  Patient will achieve knee flexion AROM to ? 100° to improve sitting comfort.  Patient will improve quadriceps strength to ? 2+/5 for stair negotiation.  Patient will improve TUG time to ? 30 seconds to improve walking pace to be able to cross the street efficiently.  Patient will achieve an average score on the Patient-Specific Functional Scale ? 3/10 indicating improving functional mobility.  Patient will report pain no greater than 6/10 to improve performance of MRADLs. Short term goals to be met by 6/28/2022 (8 weeks)  · Pt. will demonstrate normal/unassisted ambulation on even terrain for home distances. · Patient will improve quadriceps strength to ? 3/5 for stair negotiation. · Pt. will be able to ascend steps with a reciprocal gait and descend with a step-to pattern safely using rails for balance. · Pt. will be able to do simple ADLs independently with min restrictions from knee including bathing, dressing and driving. · Patient will improve TUG time to ? 20 seconds to improve walking pace to be able to cross the street efficiently. · Patient will achieve an average score on the Patient-Specific Functional Scale ? 5/10 indicating improving functional mobility. · Patient will report pain no greater than 4/10 to improve performance of MRADLs    Long term goals to be met by 7/26/2022 (12 weeks)   Demonstrate AROM of involved knee to be ? 0°-110° allowing for ADLs with min to no restrictions from knee stiffness.  Patient will improve quadriceps strength to ? 3+/5 for stair negotiation.  Patient will demonstrate independent floor transfers for fall safety.  Pt. will be able to rise from commode height without having to use hands to help and weight distributed equally demonstrating improved functional strength of surgical LE.    Pt. will ascend and descend steps with a reciprocal gait pattern displaying good control and balance using rails only for balance.     Patient will achieve an average score on the Patient-Specific Functional Scale ? 7/10 indicating improving functional mobility.  Improve LEFS to ? 55/80 allowing for improved Functional Deficit of 31% (Mobility).  Discharged from PT     PLAN: Re-eval and focus on achieving full flexion ROM, initiate upright bike    Access Code: [email or web address]  URL: [email or web address]  Date: 05/26/2022  Prepared by: Ivett Ochoa DPT    Exercises  · 4 Way Patellar [place] - 3 x daily - 7 x weekly  · Seated Hamstring Stretch with Chair - 2 x daily - 7 x weekly - 1 sets - 5 reps - 12 seconds hold  · Seated Calf Stretch with Strap - 2 x daily - 7 x weekly - 1 sets - 5 reps - 12 seconds hold  · Supine Heel Slide with Strap - 2 x daily - 7 x weekly - 1 sets - 10 reps - 5 seconds hold  · Stride Stance Weight Shift - 4 x daily - 7 x weekly - 2 minutes duration  · Standing Hip Abduction with Counter Support - 2 x daily - 5 x weekly - 3 sets - 10 reps  · Standing Hip Extension with Counter Support - 2 x daily - 5 x weekly - 3 sets - 10 reps  · Standing Hip Flexion with Ankle Weight - 2 x daily - 5 x weekly - 3 sets - 10 reps  · Standing Terminal Knee Extension at Wall with Ball - 2 x daily - 7 x weekly - 3 sets - 10 reps - 1-2 seconds hold  · Squat with Chair and Counter Support - 1 x daily - 5 x weekly - 3 sets - 10 reps  · Ice - 2 x daily - 7 x weekly - 15-20 duration (minutes)    Patient Education  · Total Knee Replacement Handout  · Red Flags    [facility] Center[facility]iky Rivera PTA

## 2022-05-31 ENCOUNTER — OFFICE VISIT (OUTPATIENT)
Dept: ORTHOPEDIC SURGERY | Age: 67
End: 2022-05-31
Payer: MEDICARE

## 2022-05-31 DIAGNOSIS — M25.661 KNEE STIFFNESS, RIGHT: ICD-10-CM

## 2022-05-31 DIAGNOSIS — R53.1 WEAKNESS GENERALIZED: ICD-10-CM

## 2022-05-31 DIAGNOSIS — Z74.09 DECREASED FUNCTIONAL MOBILITY AND ENDURANCE: ICD-10-CM

## 2022-05-31 DIAGNOSIS — R26.89 UNSTABLE BALANCE: ICD-10-CM

## 2022-05-31 DIAGNOSIS — R26.2 DIFFICULTY IN WALKING: Primary | ICD-10-CM

## 2022-05-31 DIAGNOSIS — M25.561 ACUTE PAIN OF RIGHT KNEE: ICD-10-CM

## 2022-05-31 PROCEDURE — 97110 THERAPEUTIC EXERCISES: CPT | Performed by: PHYSICAL THERAPIST

## 2022-05-31 NOTE — PROGRESS NOTES
[address] [address] [phone]  Physical Therapy Daily Note      Referring MD: Liana Coyle MD  Diagnosis:     ICD-10-CM ICD-9-CM    1. Difficulty in walking  R26.2 719.7    2. Weakness generalized  R53.1 780.79    3. Decreased functional mobility and endurance  Z74.09 780.99    4. Unstable balance  R26.89 781.2    5. Acute pain of right knee  M25.561 719.46    6. Knee stiffness, right  M25.661 719.56      Surgery: Right Knee Arthroplasty Total Robotic Assisted Isaiah Shiva Spinal/adductor  DOS:  4/13/2022   Therapy precautions: Fall risk  Co-morbidities affecting plan of care: Vertigo    Total Timed Codes: 56 min, Total Treatment Time: 56 min  Time In: 02:10 PM  Time Out: 03:06 PM      Patient History    Past medical and surgical history:   Past Medical History:   Diagnosis Date    Arthritis     pinched nerve in neck    GERD (gastroesophageal reflux disease)     well controlled with omeprazole daily--    Hypertension     managed with medication     Iron deficiency anemia     takes oral Fe- denies hx of blood transfusion    Osteoarthritis     Vertigo     Meclizine PRN       Past Surgical History:   Procedure Laterality Date    HX COLONOSCOPY      HX HYSTERECTOMY  1995    HX ORTHOPAEDIC Left 02/2020    LEFT 3RD TOE CORRECTION OF DEFORMED TOE     HX TONSILLECTOMY       Medications: reviewed in chart   Allergies: Allergies   Allergen Reactions    Latex Rash    Atorvastatin Rash    Nexium [Esomeprazole Magnesium] Rash    Other Medication Rash     Silk tape    Seafood [Shellfish Containing Products] Rash        Chief complaints/history of injury: Patient is a 79 y.o. female with a PMH complicated as noted above. She presents to PT 3 weeks s/p right TKA. She has completed HHPT with no post-op complications. However notes last week she noted possible blood in her bowel movement.   States it was on one stool but not on the others. She has not had another bowel movement since then. SUBJECTIVE:   Patient reports burning discomfort in her knee. States she has not started a walking program at home d/t her treadmill still being in the basement. RE-EVAL:    PAIN BEST WORST PRESENT    3/10 4/10 5/10     LEFS 5/3/2022 5/31/2022   SCORE 16/80 32/80   PERCENT DEFICIT 80% 60%       Outcome Measure: The Patient-Specific Functional Scale: Activity: 5/31/2022 Comments   1. Independent/Unassisted ambulation for home and community ambulation 7/10    2. Return to premorbid sleeping pattern 8-9/10    3. Driving 3/74 Have not attempted d/t taking prescription medication   Average score:  5.17/10      Interpretation of Score: The Patient-Specific functional scale is used to quantify activity limitation and measure functional outcome for patients with any orthopaedic condition. Each activity is scored 0-10, 0 representing unable to perform activity and 10 able to perform activity at the same level as before injury or problem.  Minimum detectable change is 2 points for average score and 3 points for single activity score.        A/PROM: RIGHT LEFT              Quality of Movement   Knee: 3°- 88°/0°- 92° 1°- 110° 0°- 107°/0°- 115°  Empty End Feel           44.5     MMT: RIGHT LEFT 5/31/2022 Quality of Testing   HIP FLEX: 3/5 3+/5 3+/5     HIP ABD: 2+/5 2+/5 2/5     HIP EXT: 2+/5 2+/5 2/5     KNEE EXT: 2/5 4/5 4-/5     KNEE FLEX: 2/5 4/5 4-/5     DF: 3/5 5/5 4/5     PF: 3/5 5/5 4/5        Gait:   Assistive Device Cane   Initial Contact Decreased Heel Strike   Mid-stance Achieves flat foot   Terminal Stance Normal   Swing Phase Passes stance leg   Gait Speed Below Functional Limits   Quality Non-antalgic      Stair Management:  Ascending  Bilateral HR and Step-to leading (L)   Descending  Bilateral HR and Step-to leading (R)       Sit to Stand Transfer Characteristics   Level of Assistance Modified Independent    UE Support BUE support   Weight Shift Excessive Left   Dynamic Knee Valgus Mild   Attempts One   30 Seconds Sit to Stand Test 0          TUG 5/3/2022 5/31/2022   Time: 56 seconds 28 seconds   Assistive Device Used: Rolling Walker Cane   Time to stand up, walk a distance of 10 feet, turn around, walk back to the chair and sit down again.     Predictive Results     Seconds                Rating  <10                        Freely mobile  <20                        Mostly independent  20-19                     Variable mobility  >20                        Impaired mobility        Tandem Balance:   Right Left   EO/Firm 29 seconds 30 seconds   EC/Firm 3 seconds Unable to perform           OBJECTIVE:     Today's treatment consisted of[de-identified]    Therapeutic exercise (97655) x 56 min to develop ROM, strength, endurance and flexibility RLE. · NuStep x 6', L3  · Re-eval      ASSESSMENT: Patient has completed 4 weeks of PT.  EOC includes 0 CXL and 0 no shows. She has progressed well with treatment meeting all STGs at this time. She has subjective reports of Constant mild to moderate pain and has improved functional deficit by 20%. Objective findings revealed patient improved her gait speed by 28 seconds, strength holds against mild to moderate resistance, flexion improved by 19°, and extension is now full. Overall deficits include:  Pain, Loss in ROM, Generalized Weakness, Abnormal Ambulation, Decreased Endurance, Impaired Balance/Proprioception, Impaired Motor Function and Edema  These impairments interfere with the patient's ability to:  Perform Home Duties, Safely Ambulate, Perform Stair Negotiation, Perform Transfers, Perform ADLs & IADLs and Participate in Recreation Activities  Continued skilled PT is recommended to address the above impairments and continue progress towards remaining therapy goals. Patient was instructed to progress from a RW to a straight cane and to try and work up to 30 minutes of walking 5 days per week. Examples of 3 5-minute sessions was given per patient's tolerance. She expressed good verbal understanding of instructions. Short term goals to be met by 5/31/2022 (4 weeks)   Patient will demonstrate good recall of HEP requiring minimal verbal cuing for proper form and technique. - MET: 5/31/22     Patient will achieve full extension AROM, 0°, to normalize gait pattern. - MET: 5/31/22   Patient will achieve knee flexion AROM to ? 100° to improve sitting comfort. - MET: 5/31/22   Patient will improve quadriceps strength to ? 2+/5 for stair negotiation. - MET: 5/31/22   Patient will improve TUG time to ? 30 seconds to improve walking pace to be able to cross the street efficiently. - MET: 5/31/22   Patient will achieve an average score on the Patient-Specific Functional Scale ? 3/10 indicating improving functional mobility. - MET: 5/31/22   Patient will report pain no greater than 6/10 to improve performance of MRADLs. - MET: 5/31/22    Short term goals to be met by 6/28/2022 (8 weeks)  · Pt. will demonstrate normal/unassisted ambulation on even terrain for home distances. · Patient will improve quadriceps strength to ? 3/5 for stair negotiation. · Pt. will be able to ascend steps with a reciprocal gait and descend with a step-to pattern safely using rails for balance. · Pt. will be able to do simple ADLs independently with min restrictions from knee including bathing, dressing and driving. · Patient will improve TUG time to ? 20 seconds to improve walking pace to be able to cross the street efficiently. · Patient will achieve an average score on the Patient-Specific Functional Scale ? 5/10 indicating improving functional mobility. · Patient will report pain no greater than 4/10 to improve performance of MRADLs    Long term goals to be met by 7/26/2022 (12 weeks)   Demonstrate AROM of involved knee to be ? 0°-110° allowing for ADLs with min to no restrictions from knee stiffness.       Patient will improve quadriceps strength to ? 3+/5 for stair negotiation.  Patient will demonstrate independent floor transfers for fall safety.  Pt. will be able to rise from commode height without having to use hands to help and weight distributed equally demonstrating improved functional strength of surgical LE.    Pt. will ascend and descend steps with a reciprocal gait pattern displaying good control and balance using rails only for balance.  Patient will achieve an average score on the Patient-Specific Functional Scale ? 7/10 indicating improving functional mobility.  Improve LEFS to ? 55/80 allowing for improved Functional Deficit of 31% (Mobility).  Discharged from PT     PLAN: Re-eval and focus on achieving full flexion ROM, initiate upright bike    Access Code: [email or web address]  URL: [email or web address]  Date: 05/26/2022  Prepared by: Virgie Gagnon DPT    Exercises  · 4 Way Patellar [place] - 3 x daily - 7 x weekly  · Seated Hamstring Stretch with Chair - 2 x daily - 7 x weekly - 1 sets - 5 reps - 12 seconds hold  · Seated Calf Stretch with Strap - 2 x daily - 7 x weekly - 1 sets - 5 reps - 12 seconds hold  · Supine Heel Slide with Strap - 2 x daily - 7 x weekly - 1 sets - 10 reps - 5 seconds hold  · Stride Stance Weight Shift - 4 x daily - 7 x weekly - 2 minutes duration  · Standing Hip Abduction with Counter Support - 2 x daily - 5 x weekly - 3 sets - 10 reps  · Standing Hip Extension with Counter Support - 2 x daily - 5 x weekly - 3 sets - 10 reps  · Standing Hip Flexion with Ankle Weight - 2 x daily - 5 x weekly - 3 sets - 10 reps  · Standing Terminal Knee Extension at Wall with Ball - 2 x daily - 7 x weekly - 3 sets - 10 reps - 1-2 seconds hold  · Squat with Chair and Counter Support - 1 x daily - 5 x weekly - 3 sets - 10 reps  · Ice - 2 x daily - 7 x weekly - 15-20 duration (minutes)    Patient Education  · Total Knee Replacement Handout  · Red Flags    [employer]

## 2022-06-02 ENCOUNTER — OFFICE VISIT (OUTPATIENT)
Dept: ORTHOPEDIC SURGERY | Age: 67
End: 2022-06-02
Payer: MEDICARE

## 2022-06-02 DIAGNOSIS — R26.2 DIFFICULTY IN WALKING: Primary | ICD-10-CM

## 2022-06-02 DIAGNOSIS — M25.661 KNEE STIFFNESS, RIGHT: ICD-10-CM

## 2022-06-02 DIAGNOSIS — R26.89 UNSTABLE BALANCE: ICD-10-CM

## 2022-06-02 DIAGNOSIS — M25.561 ACUTE PAIN OF RIGHT KNEE: ICD-10-CM

## 2022-06-02 DIAGNOSIS — R53.1 WEAKNESS GENERALIZED: ICD-10-CM

## 2022-06-02 DIAGNOSIS — Z74.09 DECREASED FUNCTIONAL MOBILITY AND ENDURANCE: ICD-10-CM

## 2022-06-02 PROCEDURE — 97530 THERAPEUTIC ACTIVITIES: CPT | Performed by: PHYSICAL THERAPIST

## 2022-06-02 PROCEDURE — 97110 THERAPEUTIC EXERCISES: CPT | Performed by: PHYSICAL THERAPIST

## 2022-06-02 NOTE — PROGRESS NOTES
[address] [address] [phone]  Physical Therapy Daily Note      Referring MD: Malvin Anne MD  Diagnosis:     ICD-10-CM ICD-9-CM    1. Difficulty in walking  R26.2 719.7    2. Weakness generalized  R53.1 780.79    3. Decreased functional mobility and endurance  Z74.09 780.99    4. Unstable balance  R26.89 781.2    5. Acute pain of right knee  M25.561 719.46    6. Knee stiffness, right  M25.661 719.56      Surgery: Right Knee Arthroplasty Total Robotic Assisted [place] Shiva Spinal/adductor  DOS:  4/13/2022   Therapy precautions: Fall risk  Co-morbidities affecting plan of care: Vertigo    Total Timed Codes: 60 min, Total Treatment Time: 60 min  Time In: 02:00 PM  Time Out: 03:00 PM      Patient History    Past medical and surgical history:   Past Medical History:   Diagnosis Date    Arthritis     pinched nerve in neck    GERD (gastroesophageal reflux disease)     well controlled with omeprazole daily--    Hypertension     managed with medication     Iron deficiency anemia     takes oral Fe- denies hx of blood transfusion    Osteoarthritis     Vertigo     Meclizine PRN       Past Surgical History:   Procedure Laterality Date    HX COLONOSCOPY      HX HYSTERECTOMY  1995    HX ORTHOPAEDIC Left 02/2020    LEFT 3RD TOE CORRECTION OF DEFORMED TOE     HX TONSILLECTOMY       Medications: reviewed in chart   Allergies: Allergies   Allergen Reactions    Latex Rash    Atorvastatin Rash    Nexium [Esomeprazole Magnesium] Rash    Other Medication Rash     Silk tape    Seafood [Shellfish Containing Products] Rash        Chief complaints/history of injury: Patient is a 79 y.o. female with a PMH complicated as noted above. She presents to PT 3 weeks s/p right TKA. She has completed HHPT with no post-op complications. However notes last week she noted possible blood in her bowel movement.   States it was on one stool but not on the others. She has not had another bowel movement since then. SUBJECTIVE:   Patient reports primary c/o stiffness today. States she tried to walk with her cane this morning however did not feel comfortable so she switched back to her RW. OBJECTIVE:     Today's treatment consisted of[de-identified]    Therapeutic exercise (20877) x 45 min to develop ROM, strength, endurance and flexibility RLE. · Pendulums on upright bike x 6', Holes: 5  · Full Backwards revolutions on bike x 5', Holes: 5  · Slantboard Gastroc Stretch - 5x12\"  · Standing heel/toe raises - 3x12  · Hip flexion on machine, 10# - 3x10  · Hip ABD on machine, 10# - 3x10  · Hip Ext on machine, 20# - 3x10    Therapeutic activities (81689) x 15 min using dynamic activities to improve function related to transfers. · Forward Step-ups, 3#, BUE support x 10  · Leg Press @ U50# - 2x10  · Sit to stands, low mat + 6\" step - 3x10      ASSESSMENT: Patient was advised to ambulate in her home with her cane and to use her RW for community ambulation. She was able to progress to full revolutions on the upright bike however with mild to moderate lateral shift to the left. Patient expressed muscular fatigue however no exacerbation of concordant symptoms during treatment today. Demonstration, tactile cuing, and verbal cuing provided to ensure proper performance of exercises. Patient displayed max use of BUE during forward step-ups, so exercise was moved to the leg press. She was able to perform repeated sit to stands off an elevated surface without use of upper extremities. Short term goals to be met by 5/31/2022 (4 weeks)    - MET: 5/31/22      - MET: 5/31/22   . - MET: 5/31/22   . - MET: 5/31/22    - MET: 5/31/22   . - MET: 5/31/22   . - MET: 5/31/22    Short term goals to be met by 6/28/2022 (8 weeks)  · Pt. will demonstrate normal/unassisted ambulation on even terrain for home distances.   · Patient will improve quadriceps strength to ? 3/5 for stair negotiation. · Pt. will be able to ascend steps with a reciprocal gait and descend with a step-to pattern safely using rails for balance. · Pt. will be able to do simple ADLs independently with min restrictions from knee including bathing, dressing and driving. · Patient will improve TUG time to ? 20 seconds to improve walking pace to be able to cross the street efficiently. · Patient will achieve an average score on the Patient-Specific Functional Scale ? 5/10 indicating improving functional mobility. · Patient will report pain no greater than 4/10 to improve performance of MRADLs    Long term goals to be met by 7/26/2022 (12 weeks)   Demonstrate AROM of involved knee to be ? 0°-110° allowing for ADLs with min to no restrictions from knee stiffness.  Patient will improve quadriceps strength to ? 3+/5 for stair negotiation.  Patient will demonstrate independent floor transfers for fall safety.  Pt. will be able to rise from commode height without having to use hands to help and weight distributed equally demonstrating improved functional strength of surgical LE.    Pt. will ascend and descend steps with a reciprocal gait pattern displaying good control and balance using rails only for balance.  Patient will achieve an average score on the Patient-Specific Functional Scale ? 7/10 indicating improving functional mobility.  Improve LEFS to ? 55/80 allowing for improved Functional Deficit of 31% (Mobility).  Discharged from PT     PLAN: Assess patient's tolerance to treatment and continue with generalized hip strengthening and resume manual knee flexion stretching as indicated. Access Code: [email or web address]  URL: [email or web address]  Date: 05/26/2022  Prepared by: Jeremias Roque DPT    Exercises  · 4 Way Patellar [place] - 3 x daily - 7 x weekly  · Seated Hamstring Stretch with Chair - 2 x daily - 7 x weekly - 1 sets - 5 reps - 12 seconds hold  · Seated Calf Stretch with Strap - 2 x daily - 7 x weekly - 1 sets - 5 reps - 12 seconds hold  · Supine Heel Slide with Strap - 2 x daily - 7 x weekly - 1 sets - 10 reps - 5 seconds hold  · Stride Stance Weight Shift - 4 x daily - 7 x weekly - 2 minutes duration  · Standing Hip Abduction with Counter Support - 2 x daily - 5 x weekly - 3 sets - 10 reps  · Standing Hip Extension with Counter Support - 2 x daily - 5 x weekly - 3 sets - 10 reps  · Standing Hip Flexion with Ankle Weight - 2 x daily - 5 x weekly - 3 sets - 10 reps  · Standing Terminal Knee Extension at Wall with Ball - 2 x daily - 7 x weekly - 3 sets - 10 reps - 1-2 seconds hold  · Squat with Chair and Counter Support - 1 x daily - 5 x weekly - 3 sets - 10 reps  · Ice - 2 x daily - 7 x weekly - 15-20 duration (minutes)    Patient Education  · Total Knee Replacement Handout  · Red Flags    [facility]

## 2022-06-07 ENCOUNTER — OFFICE VISIT (OUTPATIENT)
Dept: ORTHOPEDIC SURGERY | Age: 67
End: 2022-06-07
Payer: MEDICARE

## 2022-06-07 DIAGNOSIS — Z74.09 DECREASED FUNCTIONAL MOBILITY AND ENDURANCE: ICD-10-CM

## 2022-06-07 DIAGNOSIS — R26.89 UNSTABLE BALANCE: ICD-10-CM

## 2022-06-07 DIAGNOSIS — R53.1 WEAKNESS GENERALIZED: ICD-10-CM

## 2022-06-07 DIAGNOSIS — M25.661 KNEE STIFFNESS, RIGHT: ICD-10-CM

## 2022-06-07 DIAGNOSIS — R26.2 DIFFICULTY IN WALKING: Primary | ICD-10-CM

## 2022-06-07 DIAGNOSIS — M25.561 ACUTE PAIN OF RIGHT KNEE: ICD-10-CM

## 2022-06-07 PROCEDURE — 97110 THERAPEUTIC EXERCISES: CPT | Performed by: PHYSICAL THERAPIST

## 2022-06-07 PROCEDURE — 97530 THERAPEUTIC ACTIVITIES: CPT | Performed by: PHYSICAL THERAPIST

## 2022-06-07 NOTE — PROGRESS NOTES
[address] [address] [phone]  Physical Therapy Daily Note      Referring MD: Dominic Nichols MD  Diagnosis:     ICD-10-CM ICD-9-CM    1. Difficulty in walking  R26.2 719.7    2. Weakness generalized  R53.1 780.79    3. Decreased functional mobility and endurance  Z74.09 780.99    4. Unstable balance  R26.89 781.2    5. Acute pain of right knee  M25.561 719.46    6. Knee stiffness, right  M25.661 719.56      Surgery: Right Knee Arthroplasty Total Robotic Assisted [place] Shiva Spinal/adductor  DOS:  4/13/2022   Therapy precautions: Fall risk  Co-morbidities affecting plan of care: Vertigo    Total Timed Codes: 50 min, Total Treatment Time: 56 min  Time In: 01:06 PM  Time Out: 02:02 PM      Patient History    Past medical and surgical history:   Past Medical History:   Diagnosis Date    Arthritis     pinched nerve in neck    GERD (gastroesophageal reflux disease)     well controlled with omeprazole daily--    Hypertension     managed with medication     Iron deficiency anemia     takes oral Fe- denies hx of blood transfusion    Osteoarthritis     Vertigo     Meclizine PRN       Past Surgical History:   Procedure Laterality Date    HX COLONOSCOPY      HX HYSTERECTOMY  1995    HX ORTHOPAEDIC Left 02/2020    LEFT 3RD TOE CORRECTION OF DEFORMED TOE     HX TONSILLECTOMY       Medications: reviewed in chart   Allergies: Allergies   Allergen Reactions    Latex Rash    Atorvastatin Rash    Nexium [Esomeprazole Magnesium] Rash    Other Medication Rash     Silk tape    Seafood [Shellfish Containing Products] Rash        Chief complaints/history of injury: Patient is a 79 y.o. female with a PMH complicated as noted above. She presents to PT 3 weeks s/p right TKA. She has completed HHPT with no post-op complications. However notes last week she noted possible blood in her bowel movement.   States it was on one stool but not on the others. She has not had another bowel movement since then. SUBJECTIVE:   Patient reports she woke up last night with her \"whole leg was on fire\". States she treated with Tylenol and ice. OBJECTIVE:   Findings[de-identified]  Post-Treatment Flexion A/PROM: 108°/115°    Today's treatment consisted of[de-identified]    Therapeutic exercise (65685) x 35 min to develop ROM, strength, endurance and flexibility RLE. · Pendulums on upright bike x 6', Holes: 5  · Upright Bike with backwards revolutions x 5'  · Slantboard Gastroc Stretch - 5x12\"  · Standing TKEs, 20# x 30  · LAQs, 3# x 30  · Supine heel slides  · Manual flexion stretch    Therapeutic activities (00053) x 15 min using dynamic activities to improve function related to transfers. ·   · Sit to stands, low mat + 6\" step, No UE support - 3x10  · Hurdles; low and medium height, UUE & BUE support, Forward and Lateral step-to pattern    ASSESSMENT: No BFR training today d/t patient c/o burning pain during course of her previous night. Patient continues to require mod to max verbal cuing to increase pacing of exercises. She is pain dominant with passive flexion despite empty end feel. [facility] term goals to be met by 6/28/2022 (8 weeks)  · Pt. will demonstrate normal/unassisted ambulation on even terrain for home distances. · Patient will improve quadriceps strength to ? 3/5 for stair negotiation. · Pt. will be able to ascend steps with a reciprocal gait and descend with a step-to pattern safely using rails for balance. · Pt. will be able to do simple ADLs independently with min restrictions from knee including bathing, dressing and driving. · Patient will improve TUG time to ? 20 seconds to improve walking pace to be able to cross the street efficiently. · Patient will achieve an average score on the Patient-Specific Functional Scale ? 5/10 indicating improving functional mobility.   · Patient will report pain no greater than 4/10 to improve performance of MRADLs    Long term goals to be met by 7/26/2022 (12 weeks)   Demonstrate AROM of involved knee to be ? 0°-110° allowing for ADLs with min to no restrictions from knee stiffness.  Patient will improve quadriceps strength to ? 3+/5 for stair negotiation.  Patient will demonstrate independent floor transfers for fall safety.  Pt. will be able to rise from commode height without having to use hands to help and weight distributed equally demonstrating improved functional strength of surgical LE.    Pt. will ascend and descend steps with a reciprocal gait pattern displaying good control and balance using rails only for balance.  Patient will achieve an average score on the Patient-Specific Functional Scale ? 7/10 indicating improving functional mobility.  Improve LEFS to ? 55/80 allowing for improved Functional Deficit of 31% (Mobility).  Discharged from PT     PLAN: Assess patient's tolerance to treatment and continue with generalized hip strengthening and manual knee flexion stretching as indicated. Access Code: [email or web address]  URL: [email or web address]  Date: 05/26/2022  Prepared by: Ben Pizarro DPT    Exercises  · 4 Way Patellar [place] - 3 x daily - 7 x weekly  · Seated Hamstring Stretch with Chair - 2 x daily - 7 x weekly - 1 sets - 5 reps - 12 seconds hold  · Seated Calf Stretch with Strap - 2 x daily - 7 x weekly - 1 sets - 5 reps - 12 seconds hold  · Supine Heel Slide with Strap - 2 x daily - 7 x weekly - 1 sets - 10 reps - 5 seconds hold  · Stride Stance Weight Shift - 4 x daily - 7 x weekly - 2 minutes duration  · Standing Hip Abduction with Counter Support - 2 x daily - 5 x weekly - 3 sets - 10 reps  · Standing Hip Extension with Counter Support - 2 x daily - 5 x weekly - 3 sets - 10 reps  · Standing Hip Flexion with Ankle Weight - 2 x daily - 5 x weekly - 3 sets - 10 reps  · Standing Terminal Knee Extension at Wall with Ball - 2 x daily - 7 x weekly - 3 sets - 10 reps - 1-2 seconds hold  · Squat with Chair and Counter Support - 1 x daily - 5 x weekly - 3 sets - 10 reps  · Ice - 2 x daily - 7 x weekly - 15-20 duration (minutes)    Patient Education  · Total Knee Replacement Handout  · Red Flags    [employer]

## 2022-06-09 ENCOUNTER — OFFICE VISIT (OUTPATIENT)
Dept: ORTHOPEDIC SURGERY | Age: 67
End: 2022-06-09
Payer: MEDICARE

## 2022-06-09 DIAGNOSIS — M25.661 KNEE STIFFNESS, RIGHT: ICD-10-CM

## 2022-06-09 DIAGNOSIS — Z74.09 DECREASED FUNCTIONAL MOBILITY AND ENDURANCE: ICD-10-CM

## 2022-06-09 DIAGNOSIS — R53.1 WEAKNESS GENERALIZED: ICD-10-CM

## 2022-06-09 DIAGNOSIS — R26.2 DIFFICULTY IN WALKING: Primary | ICD-10-CM

## 2022-06-09 DIAGNOSIS — M25.561 ACUTE PAIN OF RIGHT KNEE: ICD-10-CM

## 2022-06-09 DIAGNOSIS — R26.89 UNSTABLE BALANCE: ICD-10-CM

## 2022-06-09 PROCEDURE — 97110 THERAPEUTIC EXERCISES: CPT | Performed by: PHYSICAL THERAPIST

## 2022-06-09 PROCEDURE — 97140 MANUAL THERAPY 1/> REGIONS: CPT | Performed by: PHYSICAL THERAPIST

## 2022-06-09 NOTE — PROGRESS NOTES
[address] [address] [phone]  Physical Therapy Daily Note      Referring MD: Delmer Romero MD  Diagnosis:     ICD-10-CM ICD-9-CM    1. Difficulty in walking  R26.2 719.7    2. Weakness generalized  R53.1 780.79    3. Decreased functional mobility and endurance  Z74.09 780.99    4. Unstable balance  R26.89 781.2    5. Acute pain of right knee  M25.561 719.46    6. Knee stiffness, right  M25.661 719.56      Surgery: Right Knee Arthroplasty Total Robotic Assisted [place] Shiva Spinal/adductor  DOS:  4/13/2022   Therapy precautions: Fall risk  Co-morbidities affecting plan of care: Vertigo    Total Timed Codes: 33 min, Total Treatment Time: 33 min  Time In: 01:15 PM  Time Out: 01:48 PM      Patient History    Past medical and surgical history:   Past Medical History:   Diagnosis Date    Arthritis     pinched nerve in neck    GERD (gastroesophageal reflux disease)     well controlled with omeprazole daily--    Hypertension     managed with medication     Iron deficiency anemia     takes oral Fe- denies hx of blood transfusion    Osteoarthritis     Vertigo     Meclizine PRN       Past Surgical History:   Procedure Laterality Date    HX COLONOSCOPY      HX HYSTERECTOMY  1995    HX ORTHOPAEDIC Left 02/2020    LEFT 3RD TOE CORRECTION OF DEFORMED TOE     HX TONSILLECTOMY       Medications: reviewed in chart   Allergies: Allergies   Allergen Reactions    Latex Rash    Atorvastatin Rash    Nexium [Esomeprazole Magnesium] Rash    Other Medication Rash     Silk tape    Seafood [Shellfish Containing Products] Rash        Chief complaints/history of injury: Patient is a 79 y.o. female with a PMH complicated as noted above. She presents to PT 3 weeks s/p right TKA. She has completed HHPT with no post-op complications. However notes last week she noted possible blood in her bowel movement.   States it was on one stool but not on the others. She has not had another bowel movement since then. SUBJECTIVE:   Patient reports she has found if she ices her knee in the car it makes her transfers and sitting tolerance easier. OBJECTIVE:     Today's treatment consisted of[de-identified]    Therapeutic exercise (75282) x 18 min to develop ROM, strength, endurance and flexibility RLE. · NuStep x 6', BLEs only @ ? 50 SPM, L2  · Supine heel slides  · Manual flexion stretch  · KT for edema control; 2 6-sq. Fan strips    Manual therapy (86993) x 15 min utilizing techniques to improve joint and/or soft tissue mobility, ROM, and function as well as helping to decrease pain/spasms and swelling. · Palpation and assessment of soft tissue, muscles, and landmarks   · Medicupping to right patellar intervals  · STM/IASTM to right patellar intervals with emphasis on upward effleurage       ASSESSMENT: Patient presented 15 minutes late to therapy today and therefore given a modified treatment session. Medicupping performed d/t patient c/o soft tissue tightness however performed at low intensity d/t sensitivity. Kinesiotaping performed to decrease edema in the right knee. Patient was instructed on KT rationale, how to remove, and when to remove. She expressed good verbal understanding of instructions. She displayed full flexion PROM during manual stretching with an empty end feel. Short term goals to be met by 6/28/2022 (8 weeks)  · Pt. will demonstrate normal/unassisted ambulation on even terrain for home distances. · Patient will improve quadriceps strength to ? 3/5 for stair negotiation. · Pt. will be able to ascend steps with a reciprocal gait and descend with a step-to pattern safely using rails for balance. · Pt. will be able to do simple ADLs independently with min restrictions from knee including bathing, dressing and driving.    · Patient will improve TUG time to ? 20 seconds to improve walking pace to be able to cross the street efficiently. · Patient will achieve an average score on the Patient-Specific Functional Scale ? 5/10 indicating improving functional mobility. · Patient will report pain no greater than 4/10 to improve performance of MRADLs    Long term goals to be met by 7/26/2022 (12 weeks)   Demonstrate AROM of involved knee to be ? 0°-110° allowing for ADLs with min to no restrictions from knee stiffness.  Patient will improve quadriceps strength to ? 3+/5 for stair negotiation.  Patient will demonstrate independent floor transfers for fall safety.  Pt. will be able to rise from commode height without having to use hands to help and weight distributed equally demonstrating improved functional strength of surgical LE.    Pt. will ascend and descend steps with a reciprocal gait pattern displaying good control and balance using rails only for balance.  Patient will achieve an average score on the Patient-Specific Functional Scale ? 7/10 indicating improving functional mobility.  Improve LEFS to ? 55/80 allowing for improved Functional Deficit of 31% (Mobility).  Discharged from PT     PLAN: Assess patient's tolerance to treatment and progress with the addition of step-up exercises as indicated. Access Code: [email or web address]  URL: [email or web address]  Date: 05/26/2022  Prepared by: Pedro Luis Frey DPT    Exercises  · 4 Way Patellar [place] - 3 x daily - 7 x weekly  · Seated Hamstring Stretch with Chair - 2 x daily - 7 x weekly - 1 sets - 5 reps - 12 seconds hold  · Seated Calf Stretch with Strap - 2 x daily - 7 x weekly - 1 sets - 5 reps - 12 seconds hold  · Supine Heel Slide with Strap - 2 x daily - 7 x weekly - 1 sets - 10 reps - 5 seconds hold  · Stride Stance Weight Shift - 4 x daily - 7 x weekly - 2 minutes duration  · Standing Hip Abduction with Counter Support - 2 x daily - 5 x weekly - 3 sets - 10 reps  · Standing Hip Extension with Counter Support - 2 x daily - 5 x weekly - 3 sets - 10 reps  · Standing Hip Flexion with Ankle Weight - 2 x daily - 5 x weekly - 3 sets - 10 reps  · Standing Terminal Knee Extension at Wall with Ball - 2 x daily - 7 x weekly - 3 sets - 10 reps - 1-2 seconds hold  · Squat with Chair and Counter Support - 1 x daily - 5 x weekly - 3 sets - 10 reps  · Ice - 2 x daily - 7 x weekly - 15-20 duration (minutes)    Patient Education  · Total Knee Replacement Handout  · Red Flags    [facility]

## 2022-06-14 ENCOUNTER — OFFICE VISIT (OUTPATIENT)
Dept: ORTHOPEDIC SURGERY | Age: 67
End: 2022-06-14
Payer: MEDICARE

## 2022-06-14 DIAGNOSIS — R53.1 WEAKNESS GENERALIZED: ICD-10-CM

## 2022-06-14 DIAGNOSIS — Z74.09 DECREASED FUNCTIONAL MOBILITY AND ENDURANCE: ICD-10-CM

## 2022-06-14 DIAGNOSIS — M25.661 KNEE STIFFNESS, RIGHT: ICD-10-CM

## 2022-06-14 DIAGNOSIS — M25.561 ACUTE PAIN OF RIGHT KNEE: ICD-10-CM

## 2022-06-14 DIAGNOSIS — R26.89 UNSTABLE BALANCE: ICD-10-CM

## 2022-06-14 DIAGNOSIS — R26.2 DIFFICULTY IN WALKING: Primary | ICD-10-CM

## 2022-06-14 PROCEDURE — 97110 THERAPEUTIC EXERCISES: CPT | Performed by: PHYSICAL THERAPIST

## 2022-06-14 NOTE — PROGRESS NOTES
[address] [address] [phone]  Physical Therapy Daily Note      Referring MD: Isabel Hankins MD  Diagnosis:     ICD-10-CM ICD-9-CM    1. Difficulty in walking  R26.2 719.7    2. Weakness generalized  R53.1 780.79    3. Decreased functional mobility and endurance  Z74.09 780.99    4. Unstable balance  R26.89 781.2    5. Acute pain of right knee  M25.561 719.46    6. Knee stiffness, right  M25.661 719.56      Surgery: Right Knee Arthroplasty Total Robotic Assisted [place] Shiva Spinal/adductor  DOS:  4/13/2022   Therapy precautions: Fall risk  Co-morbidities affecting plan of care: Vertigo    Total Timed Codes: 55 min, Total Treatment Time: 65 min  Time In: 01:00 PM  Time Out: 02:05 PM      Patient History    Past medical and surgical history:   Past Medical History:   Diagnosis Date    Arthritis     pinched nerve in neck    GERD (gastroesophageal reflux disease)     well controlled with omeprazole daily--    Hypertension     managed with medication     Iron deficiency anemia     takes oral Fe- denies hx of blood transfusion    Osteoarthritis     Vertigo     Meclizine PRN       Past Surgical History:   Procedure Laterality Date    HX COLONOSCOPY      HX HYSTERECTOMY  1995    HX ORTHOPAEDIC Left 02/2020    LEFT 3RD TOE CORRECTION OF DEFORMED TOE     HX TONSILLECTOMY       Medications: reviewed in chart   Allergies: Allergies   Allergen Reactions    Latex Rash    Atorvastatin Rash    Nexium [Esomeprazole Magnesium] Rash    Other Medication Rash     Silk tape    Seafood [Shellfish Containing Products] Rash        Chief complaints/history of injury: Patient is a 79 y.o. female with a PMH complicated as noted above. She presents to PT 3 weeks s/p right TKA. She has completed HHPT with no post-op complications. However notes last week she noted possible blood in her bowel movement.   States it was on one stool but not on the others. She has not had another bowel movement since then. SUBJECTIVE:   Patient reports she felt the KT helped her with her movement. \"It helped me really well. \"     OBJECTIVE:     Today's treatment consisted of[de-identified]    Therapeutic exercise (70301) x 55 min to develop ROM, strength, endurance and flexibility RLE. · NuStep x 6', BLEs only @ ? 50 SPM, L2  · Slantboard gastroc stretch - 5x12\"  · Seated HSS with heel on stool - 5x12\"  · Prone Quadriceps stretch - 5x12\"  · Prone HS curls - 3x10  · Bridging - 3x10  · Resisted heel slides, yellow TB - 3x10  · SDLY clams - 3x10  · Leg Press @ U60# - 3x10  ·       ASSESSMENT:  Patient and I spoke at length about more purpose movements. Patient has a tendency to be significantly slow and tentative in all functional mobility, however is able to increase her speed with verbal cuing. She continues to experience significant right quadriceps weakness, therefore she was issued an updated HEP focused on hip and quad strengthening. Patient demonstrated new HEP with adequate form and technique and was given a written copy. Short term goals to be met by 6/28/2022 (8 weeks)  · Pt. will demonstrate normal/unassisted ambulation on even terrain for home distances. · Patient will improve quadriceps strength to ? 3/5 for stair negotiation. · Pt. will be able to ascend steps with a reciprocal gait and descend with a step-to pattern safely using rails for balance. · Pt. will be able to do simple ADLs independently with min restrictions from knee including bathing, dressing and driving. · Patient will improve TUG time to ? 20 seconds to improve walking pace to be able to cross the street efficiently. · Patient will achieve an average score on the Patient-Specific Functional Scale ? 5/10 indicating improving functional mobility.   · Patient will report pain no greater than 4/10 to improve performance of MRADLs    Long term goals to be met by 7/26/2022 (12 weeks)   Demonstrate AROM of involved knee to be ? 0°-110° allowing for ADLs with min to no restrictions from knee stiffness.  Patient will improve quadriceps strength to ? 3+/5 for stair negotiation.  Patient will demonstrate independent floor transfers for fall safety.  Pt. will be able to rise from commode height without having to use hands to help and weight distributed equally demonstrating improved functional strength of surgical LE.    Pt. will ascend and descend steps with a reciprocal gait pattern displaying good control and balance using rails only for balance.  Patient will achieve an average score on the Patient-Specific Functional Scale ? 7/10 indicating improving functional mobility.  Improve LEFS to ? 55/80 allowing for improved Functional Deficit of 31% (Mobility).  Discharged from PT     PLAN: Assess patient's tolerance to treatment and progress with the addition of step-up exercises as indicated. Re-apply KT    Access Code: Coral Linares  URL: [email or web address]  Date: 06/14/2022  Prepared by: Arvel Flow    Exercises  · 4 Way Patellar [place] - 3 x daily - 7 x weekly  · Seated Hamstring Stretch with Chair - 2 x daily - 7 x weekly - 1 sets - 5 reps - 12 seconds hold  · Seated Calf Stretch with Strap - 2 x daily - 7 x weekly - 1 sets - 5 reps - 12 seconds hold  · Seated Knee Extension AROM - 1 x daily - 5 x weekly - 3 sets - 10 reps  · Supine Heel Slide with Strap - 2 x daily - 7 x weekly - 1 sets - 10 reps - 5 seconds hold  · Supine Heel Slide - 1 x daily - 5 x weekly - 3 sets - 10 reps  · Supine Bridge - 1 x daily - 3-4 x weekly - 3 sets - 10 reps  · Clamshell - 2 x daily - 7 x weekly - 3 sets - 10 reps - 10 seconds hold  · Mini Squat with Counter Support - 1 x daily - 5 x weekly - 2 sets - 10 reps  · Ice - 2 x daily - 7 x weekly - 15-20 duration (minutes)    Patient Education  · Total Knee Replacement Handout  · Red Flags    [employer]

## 2022-06-16 ENCOUNTER — OFFICE VISIT (OUTPATIENT)
Dept: ORTHOPEDIC SURGERY | Age: 67
End: 2022-06-16
Payer: MEDICARE

## 2022-06-16 DIAGNOSIS — R26.89 UNSTABLE BALANCE: ICD-10-CM

## 2022-06-16 DIAGNOSIS — M25.561 ACUTE PAIN OF RIGHT KNEE: ICD-10-CM

## 2022-06-16 DIAGNOSIS — Z74.09 DECREASED FUNCTIONAL MOBILITY AND ENDURANCE: ICD-10-CM

## 2022-06-16 DIAGNOSIS — M25.661 KNEE STIFFNESS, RIGHT: ICD-10-CM

## 2022-06-16 DIAGNOSIS — R26.2 DIFFICULTY IN WALKING: Primary | ICD-10-CM

## 2022-06-16 DIAGNOSIS — R53.1 WEAKNESS GENERALIZED: ICD-10-CM

## 2022-06-16 PROCEDURE — 97110 THERAPEUTIC EXERCISES: CPT | Performed by: PHYSICAL THERAPIST

## 2022-06-16 NOTE — PROGRESS NOTES
others. She has not had another bowel movement since then. SUBJECTIVE:   Patient reports she felt good following her last therapy session. OBJECTIVE:   Findings[de-identified]    A/PROM: RIGHT LEFT             5/31/2022 6/16/2022 Quality of Movement   Knee: 3°- 88°/0°- 92° 1°- 110° 0°- 107°/0°- 115° 0°- 110°/0°- 114°  FirmEnd Feel        TUG 5/3/2022 5/31/2022 6/16/2022   Time: 56 seconds 28 seconds 21 seconds   Assistive Device Used: Rolling Walker [employer]   Time to stand up, walk a distance of 10 feet, turn around, walk back to the chair and sit down again.     Predictive Results     Seconds                Rating  <10                        Freely mobile  <20                        Mostly independent  20-19                     Variable mobility  >20                 Today's treatment consisted of[de-identified]    Therapeutic exercise (73762) x 53 min to develop ROM, strength, endurance and flexibility RLE. · NuStep x 6', BLEs only @ ? 50 SPM, L2  · Slantboard gastroc stretch - 5x12\"  · Seated HSS with heel on stool - 5x12\"  · Prone Quadriceps stretch - 5x12\"  · Prone HS curls - 3x10  · Quad sets with NMES x 5'  · SAQ with NMES x 5'  · Standing SLR x 5'  · KT for edema control; 2 5-sq. Fan strips      ASSESSMENT:  Patient displayed locking out of her knees during sit to stand transfers as well as excessive forward trunk flexion. Gait characterized by excessive lateral tilting. These compensatory actions are d/t poor quadriceps activation therefore NMES to the right quadriceps was initiated to facilitate muscle activation however patient does not tolerate much discomfort. No visible or palpable muscle contraction achieved. Patient would benefit from a NMES unit at home to continue quadriceps activation. Short term goals to be met by 6/28/2022 (8 weeks)  · Pt. will demonstrate normal/unassisted ambulation on even terrain for home distances.   · Patient will improve quadriceps strength to ? 3/5 for stair negotiation. · Pt. will be able to ascend steps with a reciprocal gait and descend with a step-to pattern safely using rails for balance. · Pt. will be able to do simple ADLs independently with min restrictions from knee including bathing, dressing and driving. · Patient will improve TUG time to ? 20 seconds to improve walking pace to be able to cross the street efficiently. · Patient will achieve an average score on the Patient-Specific Functional Scale ? 5/10 indicating improving functional mobility. · Patient will report pain no greater than 4/10 to improve performance of MRADLs    Long term goals to be met by 7/26/2022 (12 weeks)   Demonstrate AROM of involved knee to be ? 0°-110° allowing for ADLs with min to no restrictions from knee stiffness.  Patient will improve quadriceps strength to ? 3+/5 for stair negotiation.  Patient will demonstrate independent floor transfers for fall safety.  Pt. will be able to rise from commode height without having to use hands to help and weight distributed equally demonstrating improved functional strength of surgical LE.    Pt. will ascend and descend steps with a reciprocal gait pattern displaying good control and balance using rails only for balance.  Patient will achieve an average score on the Patient-Specific Functional Scale ? 7/10 indicating improving functional mobility.  Improve LEFS to ? 55/80 allowing for improved Functional Deficit of 31% (Mobility).  Discharged from PT     PLAN: Continue NMES to the quadriceps    Access Code: [email or web address]  URL: [email or web address]  Date: 06/14/2022  Prepared by: Pranav Laird    Exercises  · 4 Way Patellar [place] - 3 x daily - 7 x weekly  · Seated Hamstring Stretch with Chair - 2 x daily - 7 x weekly - 1 sets - 5 reps - 12 seconds hold  · Seated Calf Stretch with Strap - 2 x daily - 7 x weekly - 1 sets - 5 reps - 12 seconds hold  · Seated Knee Extension AROM - 1 x daily - 5 x weekly - 3 sets - 10 reps  · Supine Heel Slide with Strap - 2 x daily - 7 x weekly - 1 sets - 10 reps - 5 seconds hold  · Supine Heel Slide - 1 x daily - 5 x weekly - 3 sets - 10 reps  · Supine Bridge - 1 x daily - 3-4 x weekly - 3 sets - 10 reps  · Clamshell - 2 x daily - 7 x weekly - 3 sets - 10 reps - 10 seconds hold  · Mini Squat with Counter Support - 1 x daily - 5 x weekly - 2 sets - 10 reps  · Ice - 2 x daily - 7 x weekly - 15-20 duration (minutes)    Patient Education  · Total Knee Replacement Handout  · Red Flags    [facility]

## 2022-06-21 ENCOUNTER — OFFICE VISIT (OUTPATIENT)
Dept: ORTHOPEDIC SURGERY | Age: 67
End: 2022-06-21
Payer: MEDICARE

## 2022-06-21 DIAGNOSIS — R26.89 UNSTABLE BALANCE: ICD-10-CM

## 2022-06-21 DIAGNOSIS — R53.1 WEAKNESS GENERALIZED: ICD-10-CM

## 2022-06-21 DIAGNOSIS — R26.2 DIFFICULTY IN WALKING: Primary | ICD-10-CM

## 2022-06-21 DIAGNOSIS — M25.561 ACUTE PAIN OF RIGHT KNEE: ICD-10-CM

## 2022-06-21 DIAGNOSIS — Z74.09 DECREASED FUNCTIONAL MOBILITY AND ENDURANCE: ICD-10-CM

## 2022-06-21 DIAGNOSIS — M25.661 KNEE STIFFNESS, RIGHT: ICD-10-CM

## 2022-06-21 PROCEDURE — 97110 THERAPEUTIC EXERCISES: CPT | Performed by: PHYSICAL THERAPIST

## 2022-06-21 PROCEDURE — 97032 APPL MODALITY 1+ESTIM EA 15: CPT | Performed by: PHYSICAL THERAPIST

## 2022-06-21 NOTE — PROGRESS NOTES
[address] [address] [phone]  Physical Therapy Daily Note      Referring MD: Liana Coyle MD  Diagnosis:     ICD-10-CM ICD-9-CM    1. Difficulty in walking  R26.2 719.7    2. Weakness generalized  R53.1 780.79    3. Decreased functional mobility and endurance  Z74.09 780.99    4. Unstable balance  R26.89 781.2    5. Acute pain of right knee  M25.561 719.46    6. Knee stiffness, right  M25.661 719.56      Surgery: Right Knee Arthroplasty Total Robotic Assisted Isaiah Shiva Spinal/adductor  DOS:  4/13/2022   Therapy precautions: Fall risk  Co-morbidities affecting plan of care: Vertigo    Total Timed Codes: 38 min, Total Treatment Time: 38 min  Time In: 01:10 PM  Time Out: 01:48 PM      Patient History    Past medical and surgical history:   Past Medical History:   Diagnosis Date    Arthritis     pinched nerve in neck    GERD (gastroesophageal reflux disease)     well controlled with omeprazole daily--    Hypertension     managed with medication     Iron deficiency anemia     takes oral Fe- denies hx of blood transfusion    Osteoarthritis     Vertigo     Meclizine PRN       Past Surgical History:   Procedure Laterality Date    HX COLONOSCOPY      HX HYSTERECTOMY  1995    HX ORTHOPAEDIC Left 02/2020    LEFT 3RD TOE CORRECTION OF DEFORMED TOE     HX TONSILLECTOMY       Medications: reviewed in chart   Allergies: Allergies   Allergen Reactions    Latex Rash    Atorvastatin Rash    Nexium [Esomeprazole Magnesium] Rash    Other Medication Rash     Silk tape    Seafood [Shellfish Containing Products] Rash        Chief complaints/history of injury: Patient is a 79 y.o. female with a PMH complicated as noted above. She presents to PT 3 weeks s/p right TKA. She has completed HHPT with no post-op complications. However notes last week she noted possible blood in her bowel movement.   States it was on one stool but not on the others. She has not had another bowel movement since then. SUBJECTIVE:   Patient reports [facility] attempted to contact her concerning acquiring a NMES unit however she could not answer her phone at the time. States she tried to call them back but have been unsuccessful. OBJECTIVE:     Today's treatment consisted of[de-identified]    Therapeutic exercise (60764) x 15 min to develop ROM, strength, endurance and flexibility RLE. · NuStep x 6', BLEs only @ ? 50 SPM, L2    ·   · KT for edema control; 2 5-sq. Fan strips    Attended electrical stimulation (62130) x 23 min to right quadriceps in order to facilitate muscle activation. · Quad sets with NMES x 5'  · Standing TKEs with NMES, ball against wall x 5'  · Unilateral Leg Press with NMES @ 60# x 5'      ASSESSMENT:  Patient presented 10 minutes late to treatment today and received a shortened therapy session. Therefore treatment focused on NMES for quadriceps activation as she has not yet acquired her own unit. Visible muscle activation not achieved d/t patient tolerance. Short term goals to be met by 6/28/2022 (8 weeks)  · Pt. will demonstrate normal/unassisted ambulation on even terrain for home distances. · Patient will improve quadriceps strength to ? 3/5 for stair negotiation. · Pt. will be able to ascend steps with a reciprocal gait and descend with a step-to pattern safely using rails for balance. · Pt. will be able to do simple ADLs independently with min restrictions from knee including bathing, dressing and driving. · Patient will improve TUG time to ? 20 seconds to improve walking pace to be able to cross the street efficiently. · Patient will achieve an average score on the Patient-Specific Functional Scale ? 5/10 indicating improving functional mobility.   · Patient will report pain no greater than 4/10 to improve performance of MRADLs    Long term goals to be met by 7/26/2022 (12 weeks)   Demonstrate AROM of involved knee to be ? 0°-110° allowing for ADLs with min to no restrictions from knee stiffness.  Patient will improve quadriceps strength to ? 3+/5 for stair negotiation.  Patient will demonstrate independent floor transfers for fall safety.  Pt. will be able to rise from commode height without having to use hands to help and weight distributed equally demonstrating improved functional strength of surgical LE.    Pt. will ascend and descend steps with a reciprocal gait pattern displaying good control and balance using rails only for balance.  Patient will achieve an average score on the Patient-Specific Functional Scale ? 7/10 indicating improving functional mobility.  Improve LEFS to ? 55/80 allowing for improved Functional Deficit of 31% (Mobility).  Discharged from PT     PLAN: Continue NMES to the quadriceps    Access Code: [email or web address]  URL: [email or web address]  Date: 06/14/2022  Prepared by: Cristino Smalls    Exercises  · 4 Way Patellar [place] - 3 x daily - 7 x weekly  · Seated Hamstring Stretch with Chair - 2 x daily - 7 x weekly - 1 sets - 5 reps - 12 seconds hold  · Seated Calf Stretch with Strap - 2 x daily - 7 x weekly - 1 sets - 5 reps - 12 seconds hold  · Seated Knee Extension AROM - 1 x daily - 5 x weekly - 3 sets - 10 reps  · Supine Heel Slide with Strap - 2 x daily - 7 x weekly - 1 sets - 10 reps - 5 seconds hold  · Supine Heel Slide - 1 x daily - 5 x weekly - 3 sets - 10 reps  · Supine Bridge - 1 x daily - 3-4 x weekly - 3 sets - 10 reps  · Clamshell - 2 x daily - 7 x weekly - 3 sets - 10 reps - 10 seconds hold  · Mini Squat with Counter Support - 1 x daily - 5 x weekly - 2 sets - 10 reps  · Ice - 2 x daily - 7 x weekly - 15-20 duration (minutes)    Patient Education  · Total Knee Replacement Handout  · Red Flags    [employer]

## 2022-06-23 ENCOUNTER — OFFICE VISIT (OUTPATIENT)
Dept: ORTHOPEDIC SURGERY | Age: 67
End: 2022-06-23
Payer: MEDICARE

## 2022-06-23 ENCOUNTER — OFFICE VISIT (OUTPATIENT)
Dept: ORTHOPEDIC SURGERY | Age: 67
End: 2022-06-23

## 2022-06-23 DIAGNOSIS — M25.561 ACUTE PAIN OF RIGHT KNEE: ICD-10-CM

## 2022-06-23 DIAGNOSIS — Z96.651 STATUS POST RIGHT KNEE REPLACEMENT: Primary | ICD-10-CM

## 2022-06-23 DIAGNOSIS — M25.661 KNEE STIFFNESS, RIGHT: ICD-10-CM

## 2022-06-23 DIAGNOSIS — R26.89 UNSTABLE BALANCE: ICD-10-CM

## 2022-06-23 DIAGNOSIS — R26.2 DIFFICULTY IN WALKING: Primary | ICD-10-CM

## 2022-06-23 DIAGNOSIS — R53.1 WEAKNESS GENERALIZED: ICD-10-CM

## 2022-06-23 DIAGNOSIS — Z74.09 DECREASED FUNCTIONAL MOBILITY AND ENDURANCE: ICD-10-CM

## 2022-06-23 PROCEDURE — 97032 APPL MODALITY 1+ESTIM EA 15: CPT | Performed by: PHYSICAL THERAPIST

## 2022-06-23 PROCEDURE — 97110 THERAPEUTIC EXERCISES: CPT | Performed by: PHYSICAL THERAPIST

## 2022-06-23 PROCEDURE — 97112 NEUROMUSCULAR REEDUCATION: CPT | Performed by: PHYSICAL THERAPIST

## 2022-06-23 NOTE — PROGRESS NOTES
others. She has not had another bowel movement since then. SUBJECTIVE:   Patient reports she has not been able to get in contact with [employer] to acquire a NMES unit and she has not started a walking program at home d/t the treadmill being down in the basement and she is waiting for her  to bring it upstairs. OBJECTIVE:     Today's treatment consisted of[de-identified]    Therapeutic exercise (98660) x 20 min to develop ROM, strength, endurance and flexibility RLE. · NuStep x 6', BLEs only @ ? 55 SPM, L2  · Supine heel slides - 10x5\"  · Resisted heel sides against red TB x 30  · Treadmill walking @ 1.2 mph, BUE support, SBA x 1, 0% incline x 5'    Attended electrical stimulation (38759) x 28 min to right quadriceps in order to facilitate muscle activation. · [place] Stimulation, Continuous, Co-contraction, 1 second Ramp  · Leg Press @ B80# & U60# - 3x10  · LAQs, 3# - 3x10 with 30\" hold every 10th rep  · Wall sits - 3x30\"     Neuromuscular reeducation (91254) x 8 min addressing impaired balance, coordination, kinesthetic sense, posture and proprioception. · Balance board transitions; knee flx/ext - 2x10 with BUE support  · Balance board stabilization; med/lat displacement - 2x1'    ASSESSMENT:  I contacted our [facility]  and asked her to facilitate contacting the patient again to acquire an NMES unit from home. Patient was strongly encouraged to take a more active role in her recovery as, at the end of the day, it is her functional mobility she is responsible for. Patient's primary complaint during treatment today was left knee pain that was most notable during wall sits and balance board exercises. However she was able to tolerate extended walking on the treadmill to conclude treatment. She experienced one incident of right knee instability at the beginning of treadmill walking however experienced no falls.   Verbal cue provided for patient to consciously activate her quadriceps at right initial contact to prevent giving way. No other incidents followed after verbal cuing. Short term goals to be met by 6/28/2022 (8 weeks)  · Pt. will demonstrate normal/unassisted ambulation on even terrain for home distances. · Patient will improve quadriceps strength to ? 3/5 for stair negotiation. · Pt. will be able to ascend steps with a reciprocal gait and descend with a step-to pattern safely using rails for balance. · Pt. will be able to do simple ADLs independently with min restrictions from knee including bathing, dressing and driving. · Patient will improve TUG time to ? 20 seconds to improve walking pace to be able to cross the street efficiently. · Patient will achieve an average score on the Patient-Specific Functional Scale ? 5/10 indicating improving functional mobility. · Patient will report pain no greater than 4/10 to improve performance of MRADLs    Long term goals to be met by 7/26/2022 (12 weeks)   Demonstrate AROM of involved knee to be ? 0°-110° allowing for ADLs with min to no restrictions from knee stiffness.  Patient will improve quadriceps strength to ? 3+/5 for stair negotiation.  Patient will demonstrate independent floor transfers for fall safety.  Pt. will be able to rise from commode height without having to use hands to help and weight distributed equally demonstrating improved functional strength of surgical LE.    Pt. will ascend and descend steps with a reciprocal gait pattern displaying good control and balance using rails only for balance.  Patient will achieve an average score on the Patient-Specific Functional Scale ? 7/10 indicating improving functional mobility.  Improve LEFS to ? 55/80 allowing for improved Functional Deficit of 31% (Mobility).  Discharged from PT     PLAN: Re-eval and try to conclude each session with treadmill walking    Access Code: [email or web address]  URL: [email or web address]  Date: 06/14/2022  Prepared by: Jeremie Iqbal DPT    Exercises  · 4 Way Patellar [place] - 3 x daily - 7 x weekly  · Seated Hamstring Stretch with Chair - 2 x daily - 7 x weekly - 1 sets - 5 reps - 12 seconds hold  · Seated Calf Stretch with Strap - 2 x daily - 7 x weekly - 1 sets - 5 reps - 12 seconds hold  · Seated Knee Extension AROM - 1 x daily - 5 x weekly - 3 sets - 10 reps  · Supine Heel Slide with Strap - 2 x daily - 7 x weekly - 1 sets - 10 reps - 5 seconds hold  · Supine Heel Slide - 1 x daily - 5 x weekly - 3 sets - 10 reps  · Supine Bridge - 1 x daily - 3-4 x weekly - 3 sets - 10 reps  · Clamshell - 2 x daily - 7 x weekly - 3 sets - 10 reps - 10 seconds hold  · Mini Squat with Counter Support - 1 x daily - 5 x weekly - 2 sets - 10 reps  · Ice - 2 x daily - 7 x weekly - 15-20 duration (minutes)    Patient Education  · Total Knee Replacement Handout  · Red Flags    [facility]

## 2022-06-23 NOTE — PROGRESS NOTES
Name: Nori Lee  YOB: 1955  Gender: female  MRN: [id number]    CC: Follow-up (fortunato rt tka-rom)       HPI: Nori Lee is a 79 y.o. female who presents with Follow-up (fortunato rt tka-rom)  The patient returns today for motion check right total knee arthroplasty. She is still [facility] and is seeing Colton Shannon. She is making good progress with regard to her right knee motion. She is having less discomfort and pain. She is ambulatory with a cane. History was obtained by patient    ROS/Meds/PSH/PMH/FH/SH: I personally reviewed the patients standard intake form. No current outpatient medications on file. Past Medical History:   Diagnosis Date    Arthritis     pinched nerve in neck    GERD (gastroesophageal reflux disease)     well controlled with omeprazole daily--    Hypertension     managed with medication     Iron deficiency anemia     takes oral Fe- denies hx of blood transfusion    Osteoarthritis     Vertigo     Meclizine PRN         Physical Examination:  Alert and oriented x3. Ambulatory with a short stride at gait. Incision right knee well-healed with keloid formation. Range of motion is good 0 to 110 degrees. Good quad strength with no extensor lag. No popliteal masses. Calf is soft nontender. Neurovascular exam is normal.    Imaging:   New x-rays    Assessment:   The patient is improving with regard to her right knee. There is no need for manipulation. She will complete her outpatient physical therapy and resume activities as she is able.   She is to return here in 3 months for follow-up    Plan:   Return in 3 months

## 2022-06-28 ENCOUNTER — OFFICE VISIT (OUTPATIENT)
Dept: ORTHOPEDIC SURGERY | Age: 67
End: 2022-06-28
Payer: MEDICARE

## 2022-06-28 DIAGNOSIS — M25.661 KNEE STIFFNESS, RIGHT: ICD-10-CM

## 2022-06-28 DIAGNOSIS — M25.561 ACUTE PAIN OF RIGHT KNEE: ICD-10-CM

## 2022-06-28 DIAGNOSIS — Z74.09 DECREASED FUNCTIONAL MOBILITY AND ENDURANCE: ICD-10-CM

## 2022-06-28 DIAGNOSIS — R26.2 DIFFICULTY IN WALKING: Primary | ICD-10-CM

## 2022-06-28 DIAGNOSIS — R26.89 UNSTABLE BALANCE: ICD-10-CM

## 2022-06-28 DIAGNOSIS — R53.1 WEAKNESS GENERALIZED: ICD-10-CM

## 2022-06-28 PROCEDURE — 97530 THERAPEUTIC ACTIVITIES: CPT | Performed by: PHYSICAL THERAPIST

## 2022-06-28 PROCEDURE — 97110 THERAPEUTIC EXERCISES: CPT | Performed by: PHYSICAL THERAPIST

## 2022-06-28 NOTE — PROGRESS NOTES
[address] [facility]  [phone]  Physical Therapy Progress Report      Referring MD: Brigid Day MD  Diagnosis:     ICD-10-CM ICD-9-CM    1. Difficulty in walking  R26.2 719.7    2. Weakness generalized  R53.1 780.79    3. Decreased functional mobility and endurance  Z74.09 780.99    4. Unstable balance  R26.89 781.2    5. Acute pain of right knee  M25.561 719.46    6. Knee stiffness, right  M25.661 719.56      Surgery: Right Knee Arthroplasty Total Robotic Assisted Isaiah Shiva Spinal/adductor  DOS:  4/13/2022   Therapy precautions: Fall risk  Co-morbidities affecting plan of care: Vertigo    Total Timed Codes: 55 min, Total Treatment Time: 55 min  Time In: 01:15 PM  Time Out: 02:10 PM      Patient History    Past medical and surgical history:   Past Medical History:   Diagnosis Date    Arthritis     pinched nerve in neck    GERD (gastroesophageal reflux disease)     well controlled with omeprazole daily--    Hypertension     managed with medication     Iron deficiency anemia     takes oral Fe- denies hx of blood transfusion    Osteoarthritis     Vertigo     Meclizine PRN       Past Surgical History:   Procedure Laterality Date    HX COLONOSCOPY      HX HYSTERECTOMY  1995    HX ORTHOPAEDIC Left 02/2020    LEFT 3RD TOE CORRECTION OF DEFORMED TOE     HX TONSILLECTOMY       Medications: reviewed in chart   Allergies: Allergies   Allergen Reactions    Latex Rash    Atorvastatin Rash    Nexium [Esomeprazole Magnesium] Rash    Other Medication Rash     Silk tape    Seafood [Shellfish Containing Products] Rash        Chief complaints/history of injury: Patient is a 79 y.o. female with a PMH complicated as noted above. She presents to PT 3 weeks s/p right TKA. She has completed HHPT with no post-op complications. However notes last week she noted possible blood in her bowel movement.   States it was on one stool but not on the others. She has not had another bowel movement since then. SUBJECTIVE:   Patient reports she has not attempted to drive d/t still taking prescription pain medication. States Dr. Angie Little advised her to wean off them. States she got in contact with the [facility] rep however the phone call got disconnected. RE-EVAL:    PAIN BEST WORST PRESENT    5/10 7/10 6/10     LEFS 5/3/2022 5/31/2022 6/28/2022   SCORE 16/80 32/80 30/80   PERCENT DEFICIT 80% 60% 62%       Outcome Measure: The Patient-Specific Functional Scale: Activity: 5/31/2022 6/28/2022 Comments   1. Independent/Unassisted ambulation for home and community ambulation 7/10 8/10    2. Return to premorbid sleeping pattern 8-9/10 8/10    3. Driving 4/37 1/24    Average score:  5.17/10 5.33/10      Interpretation of Score: The Patient-Specific functional scale is used to quantify activity limitation and measure functional outcome for patients with any orthopaedic condition. Each activity is scored 0-10, 0 representing unable to perform activity and 10 able to perform activity at the same level as before injury or problem.  Minimum detectable change is 2 points for average score and 3 points for single activity score.        A/PROM: RIGHT LEFT             5/31/2022 6/28/2022 Quality of Movement   Knee: 3°- 88°/0°- 92° 1°- 110° 0°- 107°/0°- 115° 0°- 110°/0°- 120° Mildly Firm End Feel       MMT: RIGHT LEFT 5/31/2022 6/28/2022 Quality of Testing   HIP FLEX: 3/5 3+/5 3+/5 3+/5     HIP ABD: 2+/5 2+/5 2/5 3/5     HIP EXT: 2+/5 2+/5 2/5 2/5     KNEE EXT: 2/5 4/5 4-/5 4/5     KNEE FLEX: 2/5 4/5 4-/5 4-/5     DF: 3/5 5/5 4/5 4+/5     PF: 3/5 5/5 4/5 4+/5        Gait:   Assistive Device Cane   Initial Contact Decreased Heel Strike   Mid-stance Achieves flat foot   Terminal Stance Normal   Swing Phase Passes stance leg   Gait Speed Below Functional Limits   Quality Non-antalgic      Stair Management:  Ascending  Bilateral HR and Reciprocal, Max use of BUEs (pulls self forward)   Descending  Bilateral HR and Reciprocal, slow, discontinuous, max use of BUE       Sit to Stand Transfer Characteristics   Level of Assistance Modified Independent    UE Support BUE support   Weight Shift Trunk collapse, bilateral knee hyperextension   Dynamic Knee Valgus Mild   Attempts One          TUG 5/3/2022 5/31/2022 6/28/2022   Time: 56 seconds 28 seconds 20.67 seconds   Assistive Device Used: Rolling Walker [employer]   Time to stand up, walk a distance of 10 feet, turn around, walk back to the chair and sit down again.     Predictive Results     Seconds                Rating  <10                        Freely mobile  <20                        Mostly independent  20-19                     Variable mobility  >20                        Impaired mobility        SLS Balance:   Right Left   EO/Firm 2 seconds 4 seconds       OBJECTIVE:     Today's treatment consisted of[de-identified]    Therapeutic exercise (31928) x 40 min to develop ROM, strength, endurance and flexibility RLE. · NuStep x 6', BLEs only @ ? 55 SPM, L2  · Re-eval  · SDLY Hip ABD, Min A x 1 - 2x10  · Treadmill walking @ 1.2 mph, BUE support, SBA x 1, 0% incline x 5'  · Updated HEP    Therapeutic activities (52998) x 15 min using dynamic activities to improve function related to transfers. · Stair negotiation   · SLS/Firm/EO with BUE support  · Bridging - 2x10      ASSESSMENT:  Patient has completed 8 weeks of PT.  EOC includes 0 CXL and 0 no shows. She has progressed gradually with treatment meeting 3 of 7 STGs at this time. She has subjective reports of Constant moderate pain and a functional deficit by 62%. Objective findings revealed right [facility] [place] however she required max verbal cuing, improved gait pace by 7.33 seconds, and knee strength holding against moderate responsible.   Overall deficits include:  Pain, Generalized Weakness, Abnormal Ambulation, Decreased Endurance, Impaired Balance/Proprioception, Impaired Motor Function and Edema  These impairments interfere with the patient's ability to:  Sleep, Perform Home Duties, Safely Ambulate, Perform Stair Negotiation, Perform Transfers, Perform ADLs & IADLs and Participate in Recreation Activities  Continued skilled PT is recommended to address the above impairments and continue progress towards remaining therapy goals. Patient was strongly advised to wean off her prescription pain medication so that she could return to driving. Short term goals to be met by 2022 (8 weeks)  1. Pt. will demonstrate normal/unassisted ambulation on even terrain for home distances. - NOT MET: 22   2. Patient will improve quadriceps strength to ? 3/5 for stair negotiation. - MET: 22  3. Pt. will be able to ascend steps with a reciprocal gait and descend with a step-to pattern safely using rails for balance. - MET: 22  4. Pt. will be able to do simple ADLs independently with min restrictions from knee including bathing, dressing and driving. - PROGRESSIN22  5. Patient will improve TUG time to ? 20 seconds to improve walking pace to be able to cross the street efficiently. - MET: 22  6. Patient will achieve an average score on the Patient-Specific Functional Scale ? 5/10 indicating improving functional mobility. - MET: 22  7. Patient will report pain no greater than 4/10 to improve performance of MRADLs. - NOT MET: 22    Long term goals to be met by 2022 (12 weeks)   Demonstrate AROM of involved knee to be ? 0°-110° allowing for ADLs with min to no restrictions from knee stiffness.  Patient will improve quadriceps strength to ? 3+/5 for stair negotiation.  Patient will demonstrate independent floor transfers for fall safety.    Pt. will be able to rise from commode height without having to use hands to help and weight distributed equally demonstrating improved functional strength of surgical LE.    Pt. will ascend and descend steps with a reciprocal gait pattern displaying good control and balance using rails only for balance.  Patient will achieve an average score on the Patient-Specific Functional Scale ? 7/10 indicating improving functional mobility.  Improve LEFS to ? 55/80 allowing for improved Functional Deficit of 31% (Mobility).  Discharged from PT     PLAN: Step-up progression, squatting, and increase treadmill walking to 6 minutes. Access Code: [email or web address]  URL: [email or web address]  Date: 06/28/2022  Prepared by: Sahara Mcnamara DPT    Exercises  · Seated Hamstring Stretch with Chair - 2 x daily - 7 x weekly - 1 sets - 5 reps - 12 seconds hold  · Seated Calf Stretch with Strap - 2 x daily - 7 x weekly - 1 sets - 5 reps - 12 seconds hold  · Supine Heel Slide with Strap - 2 x daily - 7 x weekly - 1 sets - 10 reps - 5 seconds hold  · Supine Heel Slide - 1 x daily - 5 x weekly - 3 sets - 10 reps  · Supine Bridge - 1 x daily - 5 x weekly - 3 sets - 10 reps  · Small Range Straight Leg Raise - 1 x daily - 5 x weekly - 3 sets - 10 reps  · Sidelying Hip Abduction - 1 x daily - 5 x weekly - 2-3 sets - 10 reps  · Standing Single Leg Stance with Counter Support - 1 x daily - 5 x weekly - 3 sets - 30 seconds hold  · Mini Squat with Counter Support - 1 x daily - 5 x weekly - 2 sets - 10 reps  · Stair Negotiation with Single Rail Using Step-Through Pattern (Foot Over Foot) - 3 x daily - 7 x weekly - 3 sets - 5 reps  · Ice - 2 x daily - 7 x weekly - 15-20 duration (minutes)    Patient Education  · Total Knee Replacement Handout  · Red Flags    [facility]

## 2022-06-30 ENCOUNTER — OFFICE VISIT (OUTPATIENT)
Dept: ORTHOPEDIC SURGERY | Age: 67
End: 2022-06-30
Payer: MEDICARE

## 2022-06-30 DIAGNOSIS — R53.1 WEAKNESS GENERALIZED: ICD-10-CM

## 2022-06-30 DIAGNOSIS — M25.661 KNEE STIFFNESS, RIGHT: ICD-10-CM

## 2022-06-30 DIAGNOSIS — R26.89 UNSTABLE BALANCE: ICD-10-CM

## 2022-06-30 DIAGNOSIS — Z74.09 DECREASED FUNCTIONAL MOBILITY AND ENDURANCE: ICD-10-CM

## 2022-06-30 DIAGNOSIS — R26.2 DIFFICULTY IN WALKING: Primary | ICD-10-CM

## 2022-06-30 DIAGNOSIS — M25.561 ACUTE PAIN OF RIGHT KNEE: ICD-10-CM

## 2022-06-30 PROCEDURE — 97530 THERAPEUTIC ACTIVITIES: CPT | Performed by: PHYSICAL THERAPIST

## 2022-06-30 PROCEDURE — 97110 THERAPEUTIC EXERCISES: CPT | Performed by: PHYSICAL THERAPIST

## 2022-06-30 PROCEDURE — 97112 NEUROMUSCULAR REEDUCATION: CPT | Performed by: PHYSICAL THERAPIST

## 2022-06-30 NOTE — PROGRESS NOTES
others. She has not had another bowel movement since then. SUBJECTIVE:   Patient reports she bought bigger shoes so that could accommodate her compression socks. OBJECTIVE:     Today's treatment consisted of[de-identified]    Therapeutic exercise (34780) x 18 min to develop ROM, strength, endurance and flexibility RLE. · Upright bike x 6', L1, 6 holes  · Treadmill walking @ 1.2 mph, BUE support, SBA x 1, 0% incline x 6'  · Manual flexion stretch    Therapeutic activities (82426) x 19 min using dynamic activities to improve function related to transfers. · Mini Squats with BUE support - 3x10  · Stairs: Ascend leading with RLE; Descend leading with LLE; BUE support  · Stairs: Side stepping; Ascend leading with RLE; Descend leading with LLE; BUE support  · Bridging - 2x10    Neuromuscular reeducation (94769) x 8 min addressing impaired balance, coordination, kinesthetic sense, posture and proprioception. · Balance Board Smooth Transitions into Static Holds - 3x10 transitions; 3x1' holds  · SLS/Firm/EO with BUE support    ASSESSMENT:  Patient displayed improved effort and pacing of exercises today. Perform stair negotiation without excessive knee extension to avoid use of quadriceps, however required max use of BUEs. She performed balance challenges without falls however required frequent UE support. Displayed significant left lateral lean on the upright bike initially to perform complete revolutions however was able to perform without compensation as her knee loosened up. Short term goals to be met by 2022 (8 weeks)  1. Pt. will demonstrate normal/unassisted ambulation on even terrain for home distances. - NOT MET: 22   2.  - MET: 22  3. . - MET: 22  4. Pt. will be able to do simple ADLs independently with min restrictions from knee including bathing, dressing and driving. - PROGRESSIN22  5.  - MET: 22  6. . - MET: 22  7.  Patient will report pain no greater than 4/10 to improve performance of MRADLs. - NOT MET: 6/28/22    Long term goals to be met by 7/26/2022 (12 weeks)   Demonstrate AROM of involved knee to be ? 0°-110° allowing for ADLs with min to no restrictions from knee stiffness.  Patient will improve quadriceps strength to ? 3+/5 for stair negotiation.  Patient will demonstrate independent floor transfers for fall safety.  Pt. will be able to rise from commode height without having to use hands to help and weight distributed equally demonstrating improved functional strength of surgical LE.    Pt. will ascend and descend steps with a reciprocal gait pattern displaying good control and balance using rails only for balance.  Patient will achieve an average score on the Patient-Specific Functional Scale ? 7/10 indicating improving functional mobility.  Improve LEFS to ? 55/80 allowing for improved Functional Deficit of 31% (Mobility).  Discharged from PT     PLAN: Continue per current program      Access Code: Kyler Plant: [email or web address]  Date: 06/28/2022  Prepared by: Ernesto Swift DPT    Exercises  · Seated Hamstring Stretch with Chair - 2 x daily - 7 x weekly - 1 sets - 5 reps - 12 seconds hold  · Seated Calf Stretch with Strap - 2 x daily - 7 x weekly - 1 sets - 5 reps - 12 seconds hold  · Supine Heel Slide with Strap - 2 x daily - 7 x weekly - 1 sets - 10 reps - 5 seconds hold  · Supine Heel Slide - 1 x daily - 5 x weekly - 3 sets - 10 reps  · Supine Bridge - 1 x daily - 5 x weekly - 3 sets - 10 reps  · Small Range Straight Leg Raise - 1 x daily - 5 x weekly - 3 sets - 10 reps  · Sidelying Hip Abduction - 1 x daily - 5 x weekly - 2-3 sets - 10 reps  · Standing Single Leg Stance with Counter Support - 1 x daily - 5 x weekly - 3 sets - 30 seconds hold  · Mini Squat with Counter Support - 1 x daily - 5 x weekly - 2 sets - 10 reps  · Stair Negotiation with Single Rail Using Step-Through Pattern (Foot Over Foot) - 3 x daily - 7 x weekly - 3 sets - 5 reps  · Ice - 2 x daily - 7 x weekly - 15-20 duration (minutes)    Patient Education  · Total Knee Replacement Handout  · Red Flags    [facility]

## 2022-07-06 ENCOUNTER — OFFICE VISIT (OUTPATIENT)
Dept: ORTHOPEDIC SURGERY | Age: 67
End: 2022-07-06
Payer: MEDICARE

## 2022-07-06 DIAGNOSIS — Z74.09 DECREASED FUNCTIONAL MOBILITY AND ENDURANCE: ICD-10-CM

## 2022-07-06 DIAGNOSIS — R26.2 DIFFICULTY IN WALKING: Primary | ICD-10-CM

## 2022-07-06 DIAGNOSIS — R53.1 WEAKNESS GENERALIZED: ICD-10-CM

## 2022-07-06 DIAGNOSIS — R26.89 UNSTABLE BALANCE: ICD-10-CM

## 2022-07-06 DIAGNOSIS — M25.561 ACUTE PAIN OF RIGHT KNEE: ICD-10-CM

## 2022-07-06 DIAGNOSIS — M25.661 KNEE STIFFNESS, RIGHT: ICD-10-CM

## 2022-07-06 PROCEDURE — 97110 THERAPEUTIC EXERCISES: CPT | Performed by: PHYSICAL THERAPIST

## 2022-07-06 PROCEDURE — 97116 GAIT TRAINING THERAPY: CPT | Performed by: PHYSICAL THERAPIST

## 2022-07-06 NOTE — PROGRESS NOTES
others. She has not had another bowel movement since then. SUBJECTIVE:   Patient reports she has her upright bike and treadmill set up at home, however she not yet gotten on the treadmill. OBJECTIVE:     Today's treatment consisted of[de-identified]    Therapeutic exercise (46485) x 29 min to develop ROM, strength, endurance and flexibility RLE. · Upright bike (incomplete revolutions > backwards revolutions > forward revolutions) x 6', L1, 6 holes  · Slantboard Gastroc Stretch - 5x12\"  · Standing HSS on step - 5x12\"  · Forward lunge stretch on step - 5x12\"  · Treadmill walking @ 1.5 mph, BUE support, SBA x 1, 0% incline x 8'  ·     Gait training (42270) x 18 min to address mechanics, proper step length and weight shifting to improve household and community mobility as well as overall safety with ADLs. · Forward Step-ups, 6\" step, BUE support - 3x10  · Hurdles: low and medium height; forward, lateral, and backwards stepping, UUE support      ASSESSMENT:  Patient experienced right knee instability while walking on the treadmill however was able to catch herself to prevent a fall. Aubrey walking performed to facilitate foot clearance in swing during ambulation. Treadmill walking progressed through increased speed as well as increased duration. Other than the instability noted at the start of walking, patient tolerated progression without difficulty. Short term goals to be met by 2022 (8 weeks)  1. Pt. will demonstrate normal/unassisted ambulation on even terrain for home distances. - NOT MET: 22   2.  - MET: 22  3. . - MET: 22  4. Pt. will be able to do simple ADLs independently with min restrictions from knee including bathing, dressing and driving. - PROGRESSIN22  5.  - MET: 22  6. . - MET: 22  7.  Patient will report pain no greater than 4/10 to improve performance of MRADLs. - NOT MET: 22    Long term goals to be met by 2022 (12 weeks)   Demonstrate AROM of involved knee to be ? 0°-110° allowing for ADLs with min to no restrictions from knee stiffness.  Patient will improve quadriceps strength to ? 3+/5 for stair negotiation.  Patient will demonstrate independent floor transfers for fall safety.  Pt. will be able to rise from commode height without having to use hands to help and weight distributed equally demonstrating improved functional strength of surgical LE.    Pt. will ascend and descend steps with a reciprocal gait pattern displaying good control and balance using rails only for balance.  Patient will achieve an average score on the Patient-Specific Functional Scale ? 7/10 indicating improving functional mobility.  Improve LEFS to ? 55/80 allowing for improved Functional Deficit of 31% (Mobility).  Discharged from PT     PLAN: Balance/proprioception challenges    Access Code: Judy Brumfield: [email or web address]  Date: 06/28/2022  Prepared by: Linh Churchill DPT    Exercises  · Seated Hamstring Stretch with Chair - 2 x daily - 7 x weekly - 1 sets - 5 reps - 12 seconds hold  · Seated Calf Stretch with Strap - 2 x daily - 7 x weekly - 1 sets - 5 reps - 12 seconds hold  · Supine Heel Slide with Strap - 2 x daily - 7 x weekly - 1 sets - 10 reps - 5 seconds hold  · Supine Heel Slide - 1 x daily - 5 x weekly - 3 sets - 10 reps  · Supine Bridge - 1 x daily - 5 x weekly - 3 sets - 10 reps  · Small Range Straight Leg Raise - 1 x daily - 5 x weekly - 3 sets - 10 reps  · Sidelying Hip Abduction - 1 x daily - 5 x weekly - 2-3 sets - 10 reps  · Standing Single Leg Stance with Counter Support - 1 x daily - 5 x weekly - 3 sets - 30 seconds hold  · Mini Squat with Counter Support - 1 x daily - 5 x weekly - 2 sets - 10 reps  · Stair Negotiation with Single Rail Using Step-Through Pattern (Foot Over Foot) - 3 x daily - 7 x weekly - 3 sets - 5 reps  · Ice - 2 x daily - 7 x weekly - 15-20 duration (minutes)    Patient Education  · Total Knee Replacement Handout  · Red Flags    Efren Gustafson

## 2022-07-08 ENCOUNTER — OFFICE VISIT (OUTPATIENT)
Dept: ORTHOPEDIC SURGERY | Age: 67
End: 2022-07-08
Payer: MEDICARE

## 2022-07-08 DIAGNOSIS — Z74.09 DECREASED FUNCTIONAL MOBILITY AND ENDURANCE: ICD-10-CM

## 2022-07-08 DIAGNOSIS — R53.1 WEAKNESS GENERALIZED: ICD-10-CM

## 2022-07-08 DIAGNOSIS — R26.2 DIFFICULTY IN WALKING: Primary | ICD-10-CM

## 2022-07-08 DIAGNOSIS — R26.89 UNSTABLE BALANCE: ICD-10-CM

## 2022-07-08 DIAGNOSIS — M25.661 KNEE STIFFNESS, RIGHT: ICD-10-CM

## 2022-07-08 DIAGNOSIS — M25.561 ACUTE PAIN OF RIGHT KNEE: ICD-10-CM

## 2022-07-08 PROCEDURE — 97140 MANUAL THERAPY 1/> REGIONS: CPT | Performed by: PHYSICAL THERAPIST

## 2022-07-08 PROCEDURE — 97110 THERAPEUTIC EXERCISES: CPT | Performed by: PHYSICAL THERAPIST

## 2022-07-08 PROCEDURE — 97112 NEUROMUSCULAR REEDUCATION: CPT | Performed by: PHYSICAL THERAPIST

## 2022-07-08 NOTE — PROGRESS NOTES
[address] [address] [phone]  Physical Therapy Daily Note      Referring MD: Deshawn Nguyen MD  Diagnosis:     ICD-10-CM ICD-9-CM    1. Difficulty in walking  R26.2 719.7    2. Weakness generalized  R53.1 780.79    3. Decreased functional mobility and endurance  Z74.09 780.99    4. Unstable balance  R26.89 781.2    5. Acute pain of right knee  M25.561 719.46    6. Knee stiffness, right  M25.661 719.56      Surgery: Right Knee Arthroplasty Total Robotic Assisted Isaiah Shiva Spinal/adductor  DOS:  4/13/2022   Therapy precautions: Fall risk  Co-morbidities affecting plan of care: Vertigo    Total Timed Codes: 63 min, Total Treatment Time: 63 min  Time In: 01:00 PM  Time Out: 02:03 PM      Patient History    Past medical and surgical history:   Past Medical History:   Diagnosis Date    Arthritis     pinched nerve in neck    GERD (gastroesophageal reflux disease)     well controlled with omeprazole daily--    Hypertension     managed with medication     Iron deficiency anemia     takes oral Fe- denies hx of blood transfusion    Osteoarthritis     Vertigo     Meclizine PRN       Past Surgical History:   Procedure Laterality Date    HX COLONOSCOPY      HX HYSTERECTOMY  1995    HX ORTHOPAEDIC Left 02/2020    LEFT 3RD TOE CORRECTION OF DEFORMED TOE     HX TONSILLECTOMY       Medications: reviewed in chart   Allergies: Allergies   Allergen Reactions    Latex Rash    Atorvastatin Rash    Nexium [Esomeprazole Magnesium] Rash    Other Medication Rash     Silk tape    Seafood [Shellfish Containing Products] Rash        Chief complaints/history of injury: Patient is a 79 y.o. female with a PMH complicated as noted above. She presents to PT 3 weeks s/p right TKA. She has completed HHPT with no post-op complications. However notes last week she noted possible blood in her bowel movement.   States it was on one stool but not on the others. She has not had another bowel movement since then. SUBJECTIVE:   Patient reports she experienced persistent burning pain in her knee last night. States she thinks its from going up and down her basement steps. States she did so about 3 times yesterday and the last 2 steps are steeper than the others. OBJECTIVE:     Today's treatment consisted of[de-identified]  Manual therapy (20837) x 15 min utilizing techniques to improve joint and/or soft tissue mobility, ROM, and function as well as helping to decrease pain/spasms and swelling.  Palpation and assessment of soft tissue, muscles, and landmarks    Medicupping to right quadriceps and patellar intervals   STM to right quadriceps and patellar intervals   Manual quadriceps stretch with contract/relax PNF    Therapeutic exercise (19673) x 18 min to develop ROM, strength, endurance and flexibility RLE. · Upright bike x 6', L1, 6 holes  · Prone hamstring curl - 2x15  · Treadmill walking @ 1.5 mph, BUE support, SBA x 1, 0% incline x 8'    Neuromuscular reeducation (41235) x 30 min addressing impaired balance, coordination, kinesthetic sense, posture and proprioception. · Lateral Step-up/down, 3\" step, BUE support - 3x10  · Balance board transitions into hold - 3x30\" > 1'  · SLS/Firm/EO x 30\"  · Rolling ball during right SLS; Front/back, Side to side, circles      ASSESSMENT:  Patient expressed significant hypersensitivity to tactile stimulation to her right knee particularly to the anterior scar. Verbal cuing provided for patient to control her breathing and try to relax during treatment as tension increases the perception of pain. Patient appeared to manage better when she was shown the pressure being used on her LLE. Short term goals to be met by 6/28/2022 (8 weeks)  1. Pt. will demonstrate normal/unassisted ambulation on even terrain for home distances. - NOT MET: 6/28/22   2.  - MET: 6/28/22  3. . - MET: 6/28/22  4.  Pt. will be able to do simple ADLs independently with min restrictions from knee including bathing, dressing and driving. - PROGRESSIN22  5.  - MET: 22  6. . - MET: 22  7. Patient will report pain no greater than 4/10 to improve performance of MRADLs. - NOT MET: 22    Long term goals to be met by 2022 (12 weeks)   Demonstrate AROM of involved knee to be ? 0°-110° allowing for ADLs with min to no restrictions from knee stiffness.  Patient will improve quadriceps strength to ? 3+/5 for stair negotiation.  Patient will demonstrate independent floor transfers for fall safety.  Pt. will be able to rise from commode height without having to use hands to help and weight distributed equally demonstrating improved functional strength of surgical LE.    Pt. will ascend and descend steps with a reciprocal gait pattern displaying good control and balance using rails only for balance.  Patient will achieve an average score on the Patient-Specific Functional Scale ? 7/10 indicating improving functional mobility.  Improve LEFS to ? 55/80 allowing for improved Functional Deficit of 31% (Mobility).  Discharged from PT     PLAN: Assess patient's tolerance to treatment and resume leg press exercises and squatting as indicated. Access Code: [email or web address]  URL: [email or web address]  Date: 2022  Prepared by: Leah Garcia DPT    Exercises  · Seated Hamstring Stretch with Chair - 2 x daily - 7 x weekly - 1 sets - 5 reps - 12 seconds hold  · Seated Calf Stretch with Strap - 2 x daily - 7 x weekly - 1 sets - 5 reps - 12 seconds hold  · Supine Heel Slide with Strap - 2 x daily - 7 x weekly - 1 sets - 10 reps - 5 seconds hold  · Supine Heel Slide - 1 x daily - 5 x weekly - 3 sets - 10 reps  · Supine Bridge - 1 x daily - 5 x weekly - 3 sets - 10 reps  · Small Range Straight Leg Raise - 1 x daily - 5 x weekly - 3 sets - 10 reps  · Sidelying Hip Abduction - 1 x daily - 5 x weekly - 2-3 sets - 10

## 2022-07-12 ENCOUNTER — OFFICE VISIT (OUTPATIENT)
Dept: ORTHOPEDIC SURGERY | Age: 67
End: 2022-07-12
Payer: MEDICARE

## 2022-07-12 DIAGNOSIS — Z74.09 DECREASED FUNCTIONAL MOBILITY AND ENDURANCE: ICD-10-CM

## 2022-07-12 DIAGNOSIS — R26.2 DIFFICULTY IN WALKING: Primary | ICD-10-CM

## 2022-07-12 DIAGNOSIS — M25.661 KNEE STIFFNESS, RIGHT: ICD-10-CM

## 2022-07-12 DIAGNOSIS — R53.1 WEAKNESS GENERALIZED: ICD-10-CM

## 2022-07-12 DIAGNOSIS — M25.561 ACUTE PAIN OF RIGHT KNEE: ICD-10-CM

## 2022-07-12 DIAGNOSIS — R26.89 UNSTABLE BALANCE: ICD-10-CM

## 2022-07-12 PROCEDURE — 97110 THERAPEUTIC EXERCISES: CPT | Performed by: PHYSICAL THERAPIST

## 2022-07-12 PROCEDURE — 97530 THERAPEUTIC ACTIVITIES: CPT | Performed by: PHYSICAL THERAPIST

## 2022-07-12 NOTE — PROGRESS NOTES
[address] [address] [phone]  Physical Therapy Daily Note      Referring MD: Alice Malik MD  Diagnosis:     ICD-10-CM ICD-9-CM    1. Difficulty in walking  R26.2 719.7    2. Weakness generalized  R53.1 780.79    3. Decreased functional mobility and endurance  Z74.09 780.99    4. Unstable balance  R26.89 781.2    5. Acute pain of right knee  M25.561 719.46    6. Knee stiffness, right  M25.661 719.56      Surgery: Right Knee Arthroplasty Total Robotic Assisted Morrice Shiva Spinal/adductor  DOS:  4/13/2022   Therapy precautions: Fall risk  Co-morbidities affecting plan of care: Vertigo    Total Timed Codes: 61 min, Total Treatment Time: 61 min  Time In: 01:00 PM  Time Out: 02:01 PM      Patient History    Past medical and surgical history:   Past Medical History:   Diagnosis Date    Arthritis     pinched nerve in neck    GERD (gastroesophageal reflux disease)     well controlled with omeprazole daily--    Hypertension     managed with medication     Iron deficiency anemia     takes oral Fe- denies hx of blood transfusion    Osteoarthritis     Vertigo     Meclizine PRN       Past Surgical History:   Procedure Laterality Date    HX COLONOSCOPY      HX HYSTERECTOMY  1995    HX ORTHOPAEDIC Left 02/2020    LEFT 3RD TOE CORRECTION OF DEFORMED TOE     HX TONSILLECTOMY       Medications: reviewed in chart   Allergies: Allergies   Allergen Reactions    Latex Rash    Atorvastatin Rash    Nexium [Esomeprazole Magnesium] Rash    Other Medication Rash     Silk tape    Seafood [Shellfish Containing Products] Rash        Chief complaints/history of injury: Patient is a 79 y.o. female with a PMH complicated as noted above. She presents to PT 3 weeks s/p right TKA. She has completed HHPT with no post-op complications. However notes last week she noted possible blood in her bowel movement.   States it was on one stool but not on the others. She has not had another bowel movement since then. SUBJECTIVE:   Patient reports she has not been walking on her treadmill as often as she would like because her  has not moved it out of the basement and she does not like walking down the steps. OBJECTIVE:     Today's treatment consisted of[de-identified]    Therapeutic exercise (13464) x 49 min to develop ROM, strength, endurance and flexibility RLE. · Upright bike x 6', L1, 6 holes  · Slantboard Gastroc Stretch - 5x12\"  · Prone Quadriceps Stretch  · Long sitting HSS - 5x12\"  · Leg press at B90# & U45# - 3x10  · Bent-over hip extensions - 3x10  · Treadmill walking @ 1.5 mph, BUE support, SBA x 1, 0% incline x 8'    Therapeutic activities () x 12 min using dynamic activities to improve function related to transfers. · Sit to stands, stride stance with LLE forward, 6\" step - 3x10  · SLS/Firm/EO - 3x30\"       ASSESSMENT:  Patient appeared to struggle with sit to stand transfers from the low mat & 3\" step, however displayed improved confidence in movement when emphasis was placed on RLE. Her right knee appears to be responding well to treatment and mobility impairments are more so d/t left knee pain. Short term goals to be met by 2022 (8 weeks)  1. Pt. will demonstrate normal/unassisted ambulation on even terrain for home distances. - NOT MET: 22   2.  - MET: 22  3. . - MET: 22  4. Pt. will be able to do simple ADLs independently with min restrictions from knee including bathing, dressing and driving. - PROGRESSIN22  5.  - MET: 22  6. . - MET: 22  7. Patient will report pain no greater than 4/10 to improve performance of MRADLs. - NOT MET: 22    Long term goals to be met by 2022 (12 weeks)   Demonstrate AROM of involved knee to be ? 0°-110° allowing for ADLs with min to no restrictions from knee stiffness.       Patient will improve quadriceps strength to ? 3+/5 for stair negotiation.  Patient will demonstrate independent floor transfers for fall safety.  Pt. will be able to rise from commode height without having to use hands to help and weight distributed equally demonstrating improved functional strength of surgical LE.    Pt. will ascend and descend steps with a reciprocal gait pattern displaying good control and balance using rails only for balance.  Patient will achieve an average score on the Patient-Specific Functional Scale ? 7/10 indicating improving functional mobility.  Improve LEFS to ? 55/80 allowing for improved Functional Deficit of 31% (Mobility).  Discharged from PT     PLAN: Assess patient's tolerance to treatment and focus on unilateral strengthening of the RLE. Update HEP    Access Code: [email or web address]  URL: [email or web address]  Date: 06/28/2022  Prepared by: Quentin Castellano DPT    Exercises  · Seated Hamstring Stretch with Chair - 2 x daily - 7 x weekly - 1 sets - 5 reps - 12 seconds hold  · Seated Calf Stretch with Strap - 2 x daily - 7 x weekly - 1 sets - 5 reps - 12 seconds hold  · Supine Heel Slide with Strap - 2 x daily - 7 x weekly - 1 sets - 10 reps - 5 seconds hold  · Supine Heel Slide - 1 x daily - 5 x weekly - 3 sets - 10 reps  · Supine Bridge - 1 x daily - 5 x weekly - 3 sets - 10 reps  · Small Range Straight Leg Raise - 1 x daily - 5 x weekly - 3 sets - 10 reps  · Sidelying Hip Abduction - 1 x daily - 5 x weekly - 2-3 sets - 10 reps  · Standing Single Leg Stance with Counter Support - 1 x daily - 5 x weekly - 3 sets - 30 seconds hold  · Mini Squat with Counter Support - 1 x daily - 5 x weekly - 2 sets - 10 reps  · Stair Negotiation with Single Rail Using Step-Through Pattern (Foot Over Foot) - 3 x daily - 7 x weekly - 3 sets - 5 reps  · Ice - 2 x daily - 7 x weekly - 15-20 duration (minutes)    Patient Education  · Total Knee Replacement Handout  · Red Flags    [employer]

## 2022-07-14 ENCOUNTER — OFFICE VISIT (OUTPATIENT)
Dept: ORTHOPEDIC SURGERY | Age: 67
End: 2022-07-14
Payer: MEDICARE

## 2022-07-14 DIAGNOSIS — R26.89 UNSTABLE BALANCE: ICD-10-CM

## 2022-07-14 DIAGNOSIS — M25.561 ACUTE PAIN OF RIGHT KNEE: ICD-10-CM

## 2022-07-14 DIAGNOSIS — R26.2 DIFFICULTY IN WALKING: Primary | ICD-10-CM

## 2022-07-14 DIAGNOSIS — M25.661 KNEE STIFFNESS, RIGHT: ICD-10-CM

## 2022-07-14 DIAGNOSIS — Z74.09 DECREASED FUNCTIONAL MOBILITY AND ENDURANCE: ICD-10-CM

## 2022-07-14 DIAGNOSIS — R53.1 WEAKNESS GENERALIZED: ICD-10-CM

## 2022-07-14 PROCEDURE — 97110 THERAPEUTIC EXERCISES: CPT | Performed by: PHYSICAL THERAPIST

## 2022-07-14 PROCEDURE — 97530 THERAPEUTIC ACTIVITIES: CPT | Performed by: PHYSICAL THERAPIST

## 2022-07-14 NOTE — PROGRESS NOTES
[address] [address] [phone]  Physical Therapy Daily Note      Referring MD: Oswaldo Ferrell MD  Diagnosis:     ICD-10-CM ICD-9-CM    1. Difficulty in walking  R26.2 719.7    2. Weakness generalized  R53.1 780.79    3. Decreased functional mobility and endurance  Z74.09 780.99    4. Unstable balance  R26.89 781.2    5. Acute pain of right knee  M25.561 719.46    6. Knee stiffness, right  M25.661 719.56      Surgery: Right Knee Arthroplasty Total Robotic Assisted [place] Shiva Spinal/adductor  DOS:  4/13/2022   Therapy precautions: Fall risk  Co-morbidities affecting plan of care: Vertigo    Total Timed Codes: 60 min, Total Treatment Time: 60 min  Time In: 01:00 PM  Time Out: 02:00 PM      Patient History    Past medical and surgical history:   Past Medical History:   Diagnosis Date    Arthritis     pinched nerve in neck    GERD (gastroesophageal reflux disease)     well controlled with omeprazole daily--    Hypertension     managed with medication     Iron deficiency anemia     takes oral Fe- denies hx of blood transfusion    Osteoarthritis     Vertigo     Meclizine PRN       Past Surgical History:   Procedure Laterality Date    HX COLONOSCOPY      HX HYSTERECTOMY  1995    HX ORTHOPAEDIC Left 02/2020    LEFT 3RD TOE CORRECTION OF DEFORMED TOE     HX TONSILLECTOMY       Medications: reviewed in chart   Allergies: Allergies   Allergen Reactions    Latex Rash    Atorvastatin Rash    Nexium [Esomeprazole Magnesium] Rash    Other Medication Rash     Silk tape    Seafood [Shellfish Containing Products] Rash        Chief complaints/history of injury: Patient is a 79 y.o. female with a PMH complicated as noted above. She presents to PT 3 weeks s/p right TKA. She has completed HHPT with no post-op complications. However notes last week she noted possible blood in her bowel movement. States it was on one stool but not on the others.   She has not had another bowel movement since then. SUBJECTIVE:   Patient reports yesterday \"I had to take extra medicine\" d/t having to negotiate steps and perform caregiver duties for her mother. OBJECTIVE:     Today's treatment consisted of[de-identified]    Therapeutic exercise (02779) x 35 min to develop ROM, strength, endurance and flexibility RLE. Upright bike x 8', L1, 6 holes  Heel Drop Gastroc Stretch - 5x12\"  Bilateral heel raise on step, BUE support - 3x10  Step HSS - 5x12\"  SDLY Quadriceps Stretch with strap - 5x12\"  LAQ, orange R-loop - 5x12\"  Leg press at B90# & U45# - 3x10  Treadmill walking @ 1.5 mph, BUE support, SBA x 1, 0% incline x 8'  Updated HEP    Therapeutic activities () x 25 min using dynamic activities to improve function related to transfers. Sit to stands, stride stance with LLE forward, 6\" step - 3x10  Forward Step-ups, BUE support, 6\" step - 3x10  Side stepping up stairs x 3       ASSESSMENT:  Patient was issued a written copy of her updated HEP which she demonstrated with adequate form and technique. Tactile and verbal cuing provided to facilitate proper form and technique. Short term goals to be met by 2022 (8 weeks)  Pt. will demonstrate normal/unassisted ambulation on even terrain for home distances. - NOT MET: 22    - MET: 22  . - MET: 22  Pt. will be able to do simple ADLs independently with min restrictions from knee including bathing, dressing and driving. - PROGRESSIN22   - MET: 22  . - MET: 22  Patient will report pain no greater than 4/10 to improve performance of MRADLs. - NOT MET: 22    Long term goals to be met by 2022 (12 weeks)  Demonstrate AROM of involved knee to be ? 0°-110° allowing for ADLs with min to no restrictions from knee stiffness. Patient will improve quadriceps strength to ? 3+/5 for stair negotiation. Patient will demonstrate independent floor transfers for fall safety.   Pt. will be able to rise from commode height without having to use hands to help and weight distributed equally demonstrating improved functional strength of surgical LE. Pt. will ascend and descend steps with a reciprocal gait pattern displaying good control and balance using rails only for balance. Patient will achieve an average score on the Patient-Specific Functional Scale ? 7/10 indicating improving functional mobility. Improve LEFS to ? 55/80 allowing for improved Functional Deficit of 31% (Mobility). Discharged from PT     PLAN: Continue functional strnegthening    Access Code: Brett Gil: [email or web address]  Date: 07/14/2022  Prepared by: Martin Brewer DPT    Exercises  Standing Bilateral Gastroc Stretch with Step - 2 x daily - 7 x weekly - 1 sets - 5 reps - 12 seconds hold  Standing Bilateral Heel Raise on Step - 1 x daily - 5 x weekly - 3 sets - 10 reps  Standing Hamstring Stretch on Chair - 2 x daily - 7 x weekly - 1 sets - 5 reps - 12 seconds hold  Standing Hamstring Curl with Chair Support - 2 x daily - 5 x weekly - 3 sets - 10 reps  Sidelying Quadriceps Stretch with Strap - 2 x daily - 7 x weekly - 1 sets - 5 reps - 12 seconds hold  Supine Heel Slide with Strap - 2 x daily - 7 x weekly - 1 sets - 10 reps - 5 seconds hold  Seated Long Arc Quad - 1 x daily - 5 x weekly - 3 sets - 10 reps  Standing Single Leg Stance with Counter Support - 1 x daily - 5 x weekly - 3 sets - 30 seconds hold  Step Up - 1 x daily - 5 x weekly - 3 sets - 10 reps  Lateral Step Up - 1 x daily - 5 x weekly - 3 sets - 10 reps  Staggered Sit-to-Stand - 1 x daily - 5 x weekly - 3 sets - 10 reps  Ice - 2 x daily - 7 x weekly - 15-20 duration (minutes)      [employer]

## 2022-07-19 ENCOUNTER — OFFICE VISIT (OUTPATIENT)
Dept: ORTHOPEDIC SURGERY | Age: 67
End: 2022-07-19
Payer: MEDICARE

## 2022-07-19 DIAGNOSIS — R26.2 DIFFICULTY IN WALKING: Primary | ICD-10-CM

## 2022-07-19 DIAGNOSIS — M25.661 KNEE STIFFNESS, RIGHT: ICD-10-CM

## 2022-07-19 DIAGNOSIS — R53.1 WEAKNESS GENERALIZED: ICD-10-CM

## 2022-07-19 DIAGNOSIS — Z74.09 DECREASED FUNCTIONAL MOBILITY AND ENDURANCE: ICD-10-CM

## 2022-07-19 DIAGNOSIS — M25.561 ACUTE PAIN OF RIGHT KNEE: ICD-10-CM

## 2022-07-19 DIAGNOSIS — R26.89 UNSTABLE BALANCE: ICD-10-CM

## 2022-07-19 PROCEDURE — 97110 THERAPEUTIC EXERCISES: CPT | Performed by: PHYSICAL THERAPIST

## 2022-07-19 PROCEDURE — 97530 THERAPEUTIC ACTIVITIES: CPT | Performed by: PHYSICAL THERAPIST

## 2022-07-19 NOTE — PROGRESS NOTES
[address] [address] [phone]  Physical Therapy Daily Note      Referring MD: Jose Cruz Montalvo MD  Diagnosis:     ICD-10-CM ICD-9-CM    1. Difficulty in walking  R26.2 719.7    2. Weakness generalized  R53.1 780.79    3. Decreased functional mobility and endurance  Z74.09 780.99    4. Unstable balance  R26.89 781.2    5. Acute pain of right knee  M25.561 719.46    6. Knee stiffness, right  M25.661 719.56      Surgery: Right Knee Arthroplasty Total Robotic Assisted [place] Shiva Spinal/adductor  DOS:  4/13/2022   Therapy precautions: Fall risk  Co-morbidities affecting plan of care: Vertigo    Total Timed Codes: 45 min, Total Treatment Time: 45 min  Time In: 01:00 PM  Time Out: 01:45 PM      Patient History    Past medical and surgical history:   Past Medical History:   Diagnosis Date    Arthritis     pinched nerve in neck    GERD (gastroesophageal reflux disease)     well controlled with omeprazole daily--    Hypertension     managed with medication     Iron deficiency anemia     takes oral Fe- denies hx of blood transfusion    Osteoarthritis     Vertigo     Meclizine PRN       Past Surgical History:   Procedure Laterality Date    HX COLONOSCOPY      HX HYSTERECTOMY  1995    HX ORTHOPAEDIC Left 02/2020    LEFT 3RD TOE CORRECTION OF DEFORMED TOE     HX TONSILLECTOMY       Medications: reviewed in chart   Allergies: Allergies   Allergen Reactions    Latex Rash    Atorvastatin Rash    Nexium [Esomeprazole Magnesium] Rash    Other Medication Rash     Silk tape    Seafood [Shellfish Containing Products] Rash        Chief complaints/history of injury: Patient is a 79 y.o. female with a PMH complicated as noted above. She presents to PT 3 weeks s/p right TKA. She has completed HHPT with no post-op complications. However notes last week she noted possible blood in her bowel movement. States it was on one stool but not on the others.   She has not had another bowel movement since then. SUBJECTIVE:   Patient reports persistent burning and \"shooting needles\" in there right knee that occur at night. Reports self treatment with cryotherapy. Has primary c/o stiffness today. OBJECTIVE:     Today's treatment consisted of[de-identified]    Therapeutic exercise (50887) x 25 min to develop ROM, strength, endurance and flexibility RLE. Upright bike x 8', L1, 6 holes  Heel Drop Gastroc Stretch - 5x12\"  Step HSS - 5x12\"  Supine hip flexor stretch with strap - 5x12\"  Bridging with orange R-loop - 3x10  Leg press at B90# & U45# - 3x10    Therapeutic activities (58809) x 20 min using dynamic activities to improve function related to transfers. Aubrey Negotiation: Forward step-to with UUE support  Lateral stepping with BUE support  Forward & Backwards stepping with UUE support        ASSESSMENT:  Patient tended to lean back while stepping over hurdles. Was able to improve with verbal cuing and UUE support. No instability present in the right knee today while ambulating in the clinic. Verbal cuing for increased tempo of reps on the leg press to facilitate increased power and stamina. Short term goals to be met by 2022 (8 weeks)  Pt. will demonstrate normal/unassisted ambulation on even terrain for home distances. - NOT MET: 22    - MET: 22  . - MET: 22  Pt. will be able to do simple ADLs independently with min restrictions from knee including bathing, dressing and driving. - PROGRESSIN22   - MET: 22  . - MET: 22  Patient will report pain no greater than 4/10 to improve performance of MRADLs. - NOT MET: 22    Long term goals to be met by 2022 (12 weeks)  Demonstrate AROM of involved knee to be ? 0°-110° allowing for ADLs with min to no restrictions from knee stiffness. Patient will improve quadriceps strength to ? 3+/5 for stair negotiation.    Patient will demonstrate independent floor transfers for fall safety. Pt. will be able to rise from commode height without having to use hands to help and weight distributed equally demonstrating improved functional strength of surgical LE. Pt. will ascend and descend steps with a reciprocal gait pattern displaying good control and balance using rails only for balance. Patient will achieve an average score on the Patient-Specific Functional Scale ? 7/10 indicating improving functional mobility. Improve LEFS to ? 55/80 allowing for improved Functional Deficit of 31% (Mobility). Discharged from PT     PLAN: Re-eval    Access Code: Gela Pickering: [email or web address]  Date: 07/14/2022  Prepared by: Berkley De La Torre DPT    Exercises  Standing Bilateral Gastroc Stretch with Step - 2 x daily - 7 x weekly - 1 sets - 5 reps - 12 seconds hold  Standing Bilateral Heel Raise on Step - 1 x daily - 5 x weekly - 3 sets - 10 reps  Standing Hamstring Stretch on Chair - 2 x daily - 7 x weekly - 1 sets - 5 reps - 12 seconds hold  Standing Hamstring Curl with Chair Support - 2 x daily - 5 x weekly - 3 sets - 10 reps  Sidelying Quadriceps Stretch with Strap - 2 x daily - 7 x weekly - 1 sets - 5 reps - 12 seconds hold  Supine Heel Slide with Strap - 2 x daily - 7 x weekly - 1 sets - 10 reps - 5 seconds hold  Seated Long Arc Quad - 1 x daily - 5 x weekly - 3 sets - 10 reps  Standing Single Leg Stance with Counter Support - 1 x daily - 5 x weekly - 3 sets - 30 seconds hold  Step Up - 1 x daily - 5 x weekly - 3 sets - 10 reps  Lateral Step Up - 1 x daily - 5 x weekly - 3 sets - 10 reps  Staggered Sit-to-Stand - 1 x daily - 5 x weekly - 3 sets - 10 reps  Ice - 2 x daily - 7 x weekly - 15-20 duration (minutes)      [email or web address]

## 2022-07-20 NOTE — PROGRESS NOTES
[address] [facility]  [phone]  Physical Therapy Progress Report      Referring MD: Lucy Walker MD  Diagnosis:     ICD-10-CM ICD-9-CM    1. Difficulty in walking  R26.2 719.7    2. Weakness generalized  R53.1 780.79    3. Decreased functional mobility and endurance  Z74.09 780.99    4. Unstable balance  R26.89 781.2    5. Acute pain of right knee  M25.561 719.46    6. Knee stiffness, right  M25.661 719.56      Surgery: Right Knee Arthroplasty Total Robotic Assisted [place] Shiva Spinal/adductor  DOS:  4/13/2022   Therapy precautions: Fall risk  Co-morbidities affecting plan of care: Vertigo    Total Timed Codes: 45 min, Total Treatment Time: 45 min  Time In: 01:05 PM  Time Out: 01:50 PM      Patient History    Past medical and surgical history:   Past Medical History:   Diagnosis Date    Arthritis     pinched nerve in neck    GERD (gastroesophageal reflux disease)     well controlled with omeprazole daily--    Hypertension     managed with medication     Iron deficiency anemia     takes oral Fe- denies hx of blood transfusion    Osteoarthritis     Vertigo     Meclizine PRN       Past Surgical History:   Procedure Laterality Date    HX COLONOSCOPY      HX HYSTERECTOMY  1995    HX ORTHOPAEDIC Left 02/2020    LEFT 3RD TOE CORRECTION OF DEFORMED TOE     HX TONSILLECTOMY       Medications: reviewed in chart   Allergies: Allergies   Allergen Reactions    Latex Rash    Atorvastatin Rash    Nexium [Esomeprazole Magnesium] Rash    Other Medication Rash     Silk tape    Seafood [Shellfish Containing Products] Rash        Chief complaints/history of injury: Patient is a 79 y.o. female with a PMH complicated as noted above. She presents to PT 3 weeks s/p right TKA. She has completed HHPT with no post-op complications. However notes last week she noted possible blood in her bowel movement. States it was on one stool but not on the others.   She has not had another bowel movement since then. RE-EVAL:    Nature of condition: Chronic (continuous duration > 3 months)  Describe current symptoms: Shooting pain along the anterior right knee scar, tightness. Reports improving mobility progressing to home ambulation without use of an assistive device. However requires BUE for transfers. Pain Assessment:  Pain location: Anterior left knee, lateral anterior more so than medial anterior    Average Pain/symptom intensity (0-10 scale)  Last 24 hours: 5/10  Last week (1-7 days): 5-6/10  How often do you feel symptoms? Frequently (51-75%)    How much have your symptoms interfered with daily activities? Quite a bit  How has your condition changed since receiving care at this facility? Better  In general, would you say your current overall health is fair       LEFS 5/3/2022 5/31/2022 6/28/2022 7/25/2022   SCORE 16/80 32/80 30/80 32/80   PERCENT DEFICIT 80% 60% 62% 60%         Outcome Measure: The Patient-Specific Functional Scale: Activity: 5/31/2022 6/28/2022 7/25/2022 Comments   1. Independent/Unassisted ambulation for home and community ambulation 7/10 8/10 8-9/10     2. Return to premorbid sleeping pattern 8-9/10 8/10 8-9/10     3. Driving 7/87 4/16 1-6/61  Patient states she has attempted driving 4 times however experiences knee stiffness when pressing on the accelerator and drives more slowly. Only comfortable driving short distances   Average score: 5.17/10 5.33/10 6.5/10          Interpretation of Score: The Patient-Specific functional scale is used to quantify activity limitation and measure functional outcome for patients with any orthopaedic condition. Each activity is scored 0-10, 0 representing unable to perform activity and 10 able to perform activity at the same level as before injury or problem. Minimum detectable change is 2 points for average score and 3 points for single activity score.        A/PROM: RIGHT LEFT 5/31/2022 6/28/2022 7/25/2022 Quality of Movement   Knee: 3°- 88°/0°- 92° 1°- 110° 0°- 107°/0°- 115° 0°- 110°/0°- 120° 0°- 110°/0°- 120° 01:50 End Feel         MMT: RIGHT LEFT 5/31/2022 6/28/2022 7/25/2022 Quality of Testing   HIP FLEX: 3/5 3+/5 3+/5 3+/5 3+/5     HIP ABD: 2+/5 2+/5 2/5 3/5 3/5     HIP EXT: 2+/5 2+/5 2/5 2/5 2/5     KNEE EXT: 2/5 4/5 4-/5 4/5 4/5  However unable to perform a supine SLR   KNEE FLEX: 2/5 4/5 4-/5 4-/5 4-/5     DF: 3/5 5/5 4/5 4+/5 4+/5     PF: 3/5 5/5 4/5 4+/5 4+/5       Gait:   Assistive Device Cane   Initial Contact Decreased Heel Strike   Mid-stance Achieves flat foot   Terminal Stance Normal   Swing Phase Passes stance leg   Gait Speed Below Functional Limits   Quality Mild Steppage      Stair Management:  Ascending  Bilateral HR and Step-to leading (L)   Descending  Bilateral HR and Step-to leading (R)             Sit to Stand Transfer Characteristics   Level of Assistance Modified Independent    UE Support BUE support   Weight Shift Symmetrical   Dynamic Knee Valgus None   Attempts One   30 Seconds Sit to Stand Test 0            TUG 5/3/2022 5/31/2022 6/28/2022 7/25/2022   Time: 56 seconds 28 seconds 20.67 seconds 20.67 seconds   Assistive Device Used: Rolling Walker TXU Avtar   Time to stand up, walk a distance of 10 feet, turn around, walk back to the chair and sit down again. Predictive Results     Seconds                Rating  <10                        Freely mobile  <20                        Mostly independent  20-19                     Variable mobility  >20                        Impaired mobility         SLS Balance:   Right Left   EO/Firm 7 seconds 6 seconds          OBJECTIVE:     Today's treatment consisted of[de-identified]    Therapeutic exercise (11656) x 45 min to develop ROM, strength, endurance and flexibility RLE. Upright bike x 8', L1, 6 holes  Re-eval        ASSESSMENT:  Patient has completed 12 weeks of PT.  EOC includes 0 CXL and 0 no shows.   She has progressed slower than expected  with treatment meeting 4 OF 7 STGs and 2 of 7 LTGs at this time. She has subjective reports of Intermittent moderate pain and no significant change in functional deficit. Objective findings revealed persistent weakness in her left hip flexor and quadriceps. She is unable to perform a SLR and hip extensor strength is poor. These deficits significantly interfere with her ability to ambulate without use of an assistive device. Overall deficits include:  Pain, Loss in ROM, Generalized Weakness, Abnormal Ambulation, Decreased Endurance, Impaired Balance/Proprioception, Impaired Motor Function, and Edema  These impairments interfere with the patient's ability to:  Perform Home Duties, Safely Ambulate, Perform Stair Negotiation, Perform Transfers, Perform ADLs & IADLs, and Participate in Recreation Activities  Patient would benefit from an extended PT episode of care. New goals have been established to reflect the patient's expected outcome at time of discharge. Short term goals to be met by 2022 (8 weeks)  Pt. will demonstrate normal/unassisted ambulation on even terrain for home distances. - PROGRESSIN2022 - Patient reports cruising on walls and furniture for home ambulation.    - MET: 22  . - MET: 22  Pt. will be able to do simple ADLs independently with min restrictions from knee including bathing, dressing and driving. - PROGRESSIN2022   - MET: 22  . - MET: 22  Patient will report pain no greater than 4/10 to improve performance of MRADLs. - PROGRESSIN2022    Long term goals to be met by 2022 (12 weeks)  Demonstrate AROM of involved knee to be ? 0°-110° allowing for ADLs with min to no restrictions from knee stiffness. - MET: 22      Patient will improve quadriceps strength to ? 3+/5 for stair negotiation. - MET: 22   Patient will demonstrate independent floor transfers for fall safety. - NOT MET: 22   Pt. will be able to rise from commode height without having to use hands to help and weight distributed equally demonstrating improved functional strength of surgical LE. - NOT MET: 22   Pt. will ascend and descend steps with a reciprocal gait pattern displaying good control and balance using rails only for balance. - PROGRESSIN22   Patient will achieve an average score on the Patient-Specific Functional Scale ? 7/10 indicating improving functional mobility. - PROGRESSIN22   Improve LEFS to ? 55/80 allowing for improved Functional Deficit of 31% (Mobility). - NOT MET: 22   Discharged from PT - NOT MET: 22     PLAN: Attempt BFR training  Effective Dates: 2022  TO 2022 (30 days). Frequency/Duration: 2x/week for 30 Day(s)  Interventions  may include but are not limited to: (39377) Therapeutic exercise to develop ROM, strength, endurance and flexibility  (30970) Therapeutic activities using dynamic activities to improve function  (29994) Gait training to address mechanics, proper step length and weight shifting to improve household and community mobility as well as overall safety with ADLs  (07538) Manual therapy techniques to improve joint and/or soft tissue mobility, ROM, and function as well as helping to decrease pain/spasms and swelling  (18812) Neuromuscular reeducation addressing impaired balance, coordination, kinesthetic sense, posture and proprioception  Home exercise program (HEP) development  Modalities prn to address pain, spasms, and swelling: (63799/) Electrical stimulation- unattended  (57129) Vasopneumatic compression    The referring physician has reviewed and approved this evaluation and plan of care as noted by the electronic signature attached to note. Access Code: [email or web address]  URL: [email or web address]  Date: 2022  Prepared by: Bong Jean DPT    Exercises  Standing Bilateral Gastroc Stretch with Step - 2 x daily - 7 x weekly - 1 sets - 5 reps - 12 seconds hold  Standing Bilateral Heel Raise on Step - 1 x daily - 5 x weekly - 3 sets - 10 reps  Standing Hamstring Stretch on Chair - 2 x daily - 7 x weekly - 1 sets - 5 reps - 12 seconds hold  Standing Hamstring Curl with Chair Support - 2 x daily - 5 x weekly - 3 sets - 10 reps  Sidelying Quadriceps Stretch with Strap - 2 x daily - 7 x weekly - 1 sets - 5 reps - 12 seconds hold  Supine Heel Slide with Strap - 2 x daily - 7 x weekly - 1 sets - 10 reps - 5 seconds hold  Active Straight Leg Raise with Quad Set - 3 x daily - 5 x weekly - 1 sets - 5 reps - 5 seconds hold  Supine Bridge - 1 x daily - 5 x weekly - 3 sets - 5 reps  Standing Single Leg Stance with Counter Support - 1 x daily - 5 x weekly - 3 sets - 30 seconds hold  Step Up - 1 x daily - 5 x weekly - 3 sets - 10 reps  Lateral Step Up - 1 x daily - 5 x weekly - 3 sets - 10 reps  Staggered Sit-to-Stand - 1 x daily - 5 x weekly - 3 sets - 10 reps  Ice - 2 x daily - 7 x weekly - 15-20 duration (minutes)        Dawna

## 2022-07-25 ENCOUNTER — OFFICE VISIT (OUTPATIENT)
Dept: ORTHOPEDIC SURGERY | Age: 67
End: 2022-07-25
Payer: MEDICARE

## 2022-07-25 DIAGNOSIS — R26.2 DIFFICULTY IN WALKING: Primary | ICD-10-CM

## 2022-07-25 DIAGNOSIS — R53.1 WEAKNESS GENERALIZED: ICD-10-CM

## 2022-07-25 DIAGNOSIS — M25.661 KNEE STIFFNESS, RIGHT: ICD-10-CM

## 2022-07-25 DIAGNOSIS — R26.89 UNSTABLE BALANCE: ICD-10-CM

## 2022-07-25 DIAGNOSIS — Z74.09 DECREASED FUNCTIONAL MOBILITY AND ENDURANCE: ICD-10-CM

## 2022-07-25 DIAGNOSIS — M25.561 ACUTE PAIN OF RIGHT KNEE: ICD-10-CM

## 2022-07-25 PROCEDURE — 97110 THERAPEUTIC EXERCISES: CPT | Performed by: PHYSICAL THERAPIST

## 2022-07-29 ENCOUNTER — OFFICE VISIT (OUTPATIENT)
Dept: ORTHOPEDIC SURGERY | Age: 67
End: 2022-07-29
Payer: MEDICARE

## 2022-07-29 DIAGNOSIS — Z74.09 DECREASED FUNCTIONAL MOBILITY AND ENDURANCE: ICD-10-CM

## 2022-07-29 DIAGNOSIS — R53.1 WEAKNESS GENERALIZED: ICD-10-CM

## 2022-07-29 DIAGNOSIS — R26.89 UNSTABLE BALANCE: ICD-10-CM

## 2022-07-29 DIAGNOSIS — R26.2 DIFFICULTY IN WALKING: Primary | ICD-10-CM

## 2022-07-29 DIAGNOSIS — M25.561 ACUTE PAIN OF RIGHT KNEE: ICD-10-CM

## 2022-07-29 DIAGNOSIS — M25.661 KNEE STIFFNESS, RIGHT: ICD-10-CM

## 2022-07-29 PROCEDURE — 97110 THERAPEUTIC EXERCISES: CPT | Performed by: PHYSICAL THERAPIST

## 2022-07-29 PROCEDURE — 97530 THERAPEUTIC ACTIVITIES: CPT | Performed by: PHYSICAL THERAPIST

## 2022-08-02 ENCOUNTER — OFFICE VISIT (OUTPATIENT)
Dept: ORTHOPEDIC SURGERY | Age: 67
End: 2022-08-02
Payer: MEDICARE

## 2022-08-02 DIAGNOSIS — R26.89 UNSTABLE BALANCE: ICD-10-CM

## 2022-08-02 DIAGNOSIS — R53.1 WEAKNESS GENERALIZED: ICD-10-CM

## 2022-08-02 DIAGNOSIS — Z74.09 DECREASED FUNCTIONAL MOBILITY AND ENDURANCE: ICD-10-CM

## 2022-08-02 DIAGNOSIS — M25.661 KNEE STIFFNESS, RIGHT: ICD-10-CM

## 2022-08-02 DIAGNOSIS — M25.561 ACUTE PAIN OF RIGHT KNEE: ICD-10-CM

## 2022-08-02 DIAGNOSIS — R26.2 DIFFICULTY IN WALKING: Primary | ICD-10-CM

## 2022-08-02 PROCEDURE — 97530 THERAPEUTIC ACTIVITIES: CPT | Performed by: PHYSICAL THERAPIST

## 2022-08-02 PROCEDURE — 97110 THERAPEUTIC EXERCISES: CPT | Performed by: PHYSICAL THERAPIST

## 2022-08-02 NOTE — PROGRESS NOTES
[address] [facility] [address] [address]  [phone]  Physical Therapy Daily Note     Referring MD: Santo Emerson MD  Diagnosis:     ICD-10-CM ICD-9-CM    1. Difficulty in walking  R26.2 719.7    2. Weakness generalized  R53.1 780.79    3. Decreased functional mobility and endurance  Z74.09 780.99    4. Unstable balance  R26.89 781.2    5. Acute pain of right knee  M25.561 719.46    6. Knee stiffness, right  M25.661 719.56      Surgery: Right Knee Arthroplasty Total Robotic Assisted Isaiah Shiva Spinal/adductor  DOS:  4/13/2022   Therapy precautions: Fall risk  Co-morbidities affecting plan of care: Vertigo    Total Timed Codes: 45 min, Total Treatment Time: 45 min  Time In: 01:45 PM  Time Out: 02:30 PM      Patient History    Past medical and surgical history:   Past Medical History:   Diagnosis Date    Arthritis     pinched nerve in neck    GERD (gastroesophageal reflux disease)     well controlled with omeprazole daily--    Hypertension     managed with medication     Iron deficiency anemia     takes oral Fe- denies hx of blood transfusion    Osteoarthritis     Vertigo     Meclizine PRN       Past Surgical History:   Procedure Laterality Date    HX COLONOSCOPY      HX HYSTERECTOMY  1995    HX ORTHOPAEDIC Left 02/2020    LEFT 3RD TOE CORRECTION OF DEFORMED TOE     HX TONSILLECTOMY       Medications: reviewed in chart   Allergies: Allergies   Allergen Reactions    Latex Rash    Atorvastatin Rash    Nexium [Esomeprazole Magnesium] Rash    Other Medication Rash     Silk tape    Seafood [Shellfish Containing Products] Rash        Chief complaints/history of injury: Patient is a 79 y.o. female with a PMH complicated as noted above. She presents to PT 3 weeks s/p right TKA. She has completed HHPT with no post-op complications. However notes last week she noted possible blood in her bowel movement. States it was on one stool but not on the others.   She has not had another bowel movement since then. SUBJECTIVE:  Patient reports no muscle soreness following her last therapy session, however notes a general malaise at present d/t taking the COVID-19 vaccine. OBJECTIVE:     Today's treatment consisted of[de-identified]    Therapeutic exercise (67889) x 35 min to develop strength in the right quadriceps   Upright bike x 8', L1, 4 holes  Unilateral knee extensions, 90°-60°, 30# to fatigue  Unilateral knee extensions, 90°-60°, 10# x 15  Unilateral knee extensions, 90°-60°, 20# to fatigue  Unilateral leg press, 60# to fatigue  Unilateral knee extensions, 90°-60°, 30# x 30  Unilateral knee extensions, 90°-60°, 20# to fatigue    Therapeutic activities (43649) x 10 min using dynamic activities to improve function related to sit to stand transfers. Squatting with BUE support    SLS, Firm, BUE support to fatigue      ASSESSMENT:  Verbal cuing provided to increase pace of exercises. Patient was given a 1 minute rest period between sets and she displayed good stability and gait speed at the conclusion of treatment. She was able to hold a single leg stance through her RLE using BUE for 1'24\", however required max verbal cuing for max effort. Long term goals to be met by 8/24/2022  Pt. will demonstrate normal/unassisted ambulation on even terrain for home distances. Patient will report pain no greater than 4/10 to improve performance of MRADLs. Pt. will be able to do simple ADLs independently with min restrictions from knee including bathing, dressing and driving. Patient will demonstrate independent floor transfers for fall safety. Patient will achieve an average score on the Patient-Specific Functional Scale ? 7/10 indicating improving functional mobility. PLAN: Continue per current POC      Access Code: [email or web address]  URL: [email or web address]  Date: 07/25/2022  Prepared by: Leah Garcia DPT    Exercises  Standing Bilateral Gastroc Stretch with Step - 2 x daily - 7 x weekly - 1 sets - 5 reps - 12 seconds hold  Standing Bilateral Heel Raise on Step - 1 x daily - 5 x weekly - 3 sets - 10 reps  Standing Hamstring Stretch on Chair - 2 x daily - 7 x weekly - 1 sets - 5 reps - 12 seconds hold  Standing Hamstring Curl with Chair Support - 2 x daily - 5 x weekly - 3 sets - 10 reps  Sidelying Quadriceps Stretch with Strap - 2 x daily - 7 x weekly - 1 sets - 5 reps - 12 seconds hold  Supine Heel Slide with Strap - 2 x daily - 7 x weekly - 1 sets - 10 reps - 5 seconds hold  Active Straight Leg Raise with Quad Set - 3 x daily - 5 x weekly - 1 sets - 5 reps - 5 seconds hold  Supine Bridge - 1 x daily - 5 x weekly - 3 sets - 5 reps  Standing Single Leg Stance with Counter Support - 1 x daily - 5 x weekly - 3 sets - 30 seconds hold  Step Up - 1 x daily - 5 x weekly - 3 sets - 10 reps  Lateral Step Up - 1 x daily - 5 x weekly - 3 sets - 10 reps  Staggered Sit-to-Stand - 1 x daily - 5 x weekly - 3 sets - 10 reps  Ice - 2 x daily - 7 x weekly - 15-20 duration (minutes)        [employer]

## 2022-08-04 ENCOUNTER — OFFICE VISIT (OUTPATIENT)
Dept: ORTHOPEDIC SURGERY | Age: 67
End: 2022-08-04
Payer: MEDICARE

## 2022-08-04 DIAGNOSIS — R26.2 DIFFICULTY IN WALKING: Primary | ICD-10-CM

## 2022-08-04 DIAGNOSIS — R53.1 WEAKNESS GENERALIZED: ICD-10-CM

## 2022-08-04 DIAGNOSIS — M25.561 ACUTE PAIN OF RIGHT KNEE: ICD-10-CM

## 2022-08-04 DIAGNOSIS — Z74.09 DECREASED FUNCTIONAL MOBILITY AND ENDURANCE: ICD-10-CM

## 2022-08-04 DIAGNOSIS — R26.89 UNSTABLE BALANCE: ICD-10-CM

## 2022-08-04 DIAGNOSIS — M25.661 KNEE STIFFNESS, RIGHT: ICD-10-CM

## 2022-08-04 PROCEDURE — 97110 THERAPEUTIC EXERCISES: CPT | Performed by: PHYSICAL THERAPIST

## 2022-08-04 PROCEDURE — 97140 MANUAL THERAPY 1/> REGIONS: CPT | Performed by: PHYSICAL THERAPIST

## 2022-08-04 NOTE — PROGRESS NOTES
[address] [address] [phone]  Physical Therapy Daily Note     Referring MD: Gudelia Brown MD  Diagnosis:     ICD-10-CM ICD-9-CM    1. Difficulty in walking  R26.2 719.7    2. Weakness generalized  R53.1 780.79    3. Decreased functional mobility and endurance  Z74.09 780.99    4. Unstable balance  R26.89 781.2    5. Acute pain of right knee  M25.561 719.46    6. Knee stiffness, right  M25.661 719.56      Surgery: Right Knee Arthroplasty Total Robotic Assisted Isaiah Shiva Spinal/adductor  DOS:  4/13/2022   Therapy precautions: Fall risk  Co-morbidities affecting plan of care: Vertigo    Total Timed Codes: 38 min, Total Treatment Time: 38 min  Time In: 01:52 PM  Time Out: 02:30 PM      Patient History    Past medical and surgical history:   Past Medical History:   Diagnosis Date    Arthritis     pinched nerve in neck    GERD (gastroesophageal reflux disease)     well controlled with omeprazole daily--    Hypertension     managed with medication     Iron deficiency anemia     takes oral Fe- denies hx of blood transfusion    Osteoarthritis     Vertigo     Meclizine PRN       Past Surgical History:   Procedure Laterality Date    HX COLONOSCOPY      HX HYSTERECTOMY  1995    HX ORTHOPAEDIC Left 02/2020    LEFT 3RD TOE CORRECTION OF DEFORMED TOE     HX TONSILLECTOMY       Medications: reviewed in chart   Allergies: Allergies   Allergen Reactions    Latex Rash    Atorvastatin Rash    Nexium [Esomeprazole Magnesium] Rash    Other Medication Rash     Silk tape    Seafood [Shellfish Containing Products] Rash        Chief complaints/history of injury: Patient is a 79 y.o. female with a PMH complicated as noted above. She presents to PT 3 weeks s/p right TKA. She has completed HHPT with no post-op complications. However notes last week she noted possible blood in her bowel movement. States it was on one stool but not on the others.   She has not had another bowel movement since then. SUBJECTIVE:  Patient reports soreness in bilateral knees today d/t helping her mother yesterday. States she had to assist her mother in stair negotiation and transfers. Rates current discomfort at 8/10 aching pain. OBJECTIVE:     Today's treatment consisted of[de-identified]    Therapeutic exercise (40295) x 28 min to develop strength in the right quadriceps   Upright bike x 8', L1, 4 holes  SAQ - 3x10 with a static hold every 10th rep  Resisted supine heel slides, yellow TB - 3x10  Supine SLR with min A x 1, 3x10 with a static hol every 10th rep    Manual therapy (73417) x 10 min utilizing techniques to improve joint and/or soft tissue mobility, ROM, and function as well as helping to decrease pain/spasms and swelling. Palpation and assessment of soft tissue, muscles, and landmarks   STM to right quadriceps and patellar interval      ASSESSMENT:  Patient continues to lack active terminal knee extension. This could be in part d/t the continued swelling in her right knee impeding full quadriceps activation. Treatment limited table exercises today as patient reported severe bilateral knee pain prior to start of treatment. Long term goals to be met by 8/24/2022  Pt. will demonstrate normal/unassisted ambulation on even terrain for home distances. Patient will report pain no greater than 4/10 to improve performance of MRADLs. Pt. will be able to do simple ADLs independently with min restrictions from knee including bathing, dressing and driving. Patient will demonstrate independent floor transfers for fall safety. Patient will achieve an average score on the Patient-Specific Functional Scale ? 7/10 indicating improving functional mobility. PLAN: Facilitate the VMO with LAQs + ADD (use theraband at ankles), Bridge + ADD, TKEs, and TKE squats      Access Code: [email or web address]  URL: [email or web address]  Date: 07/25/2022  Prepared by: Farida Kidd DPT    Exercises  Standing Bilateral Gastroc Stretch with Step - 2 x daily - 7 x weekly - 1 sets - 5 reps - 12 seconds hold  Standing Bilateral Heel Raise on Step - 1 x daily - 5 x weekly - 3 sets - 10 reps  Standing Hamstring Stretch on Chair - 2 x daily - 7 x weekly - 1 sets - 5 reps - 12 seconds hold  Standing Hamstring Curl with Chair Support - 2 x daily - 5 x weekly - 3 sets - 10 reps  Sidelying Quadriceps Stretch with Strap - 2 x daily - 7 x weekly - 1 sets - 5 reps - 12 seconds hold  Supine Heel Slide with Strap - 2 x daily - 7 x weekly - 1 sets - 10 reps - 5 seconds hold  Active Straight Leg Raise with Quad Set - 3 x daily - 5 x weekly - 1 sets - 5 reps - 5 seconds hold  Supine Bridge - 1 x daily - 5 x weekly - 3 sets - 5 reps  Standing Single Leg Stance with Counter Support - 1 x daily - 5 x weekly - 3 sets - 30 seconds hold  Step Up - 1 x daily - 5 x weekly - 3 sets - 10 reps  Lateral Step Up - 1 x daily - 5 x weekly - 3 sets - 10 reps  Staggered Sit-to-Stand - 1 x daily - 5 x weekly - 3 sets - 10 reps  Ice - 2 x daily - 7 x weekly - 15-20 duration (minutes)        [employer]

## 2022-08-09 ENCOUNTER — OFFICE VISIT (OUTPATIENT)
Dept: ORTHOPEDIC SURGERY | Age: 67
End: 2022-08-09
Payer: MEDICARE

## 2022-08-09 DIAGNOSIS — Z74.09 DECREASED FUNCTIONAL MOBILITY AND ENDURANCE: ICD-10-CM

## 2022-08-09 DIAGNOSIS — R53.1 WEAKNESS GENERALIZED: ICD-10-CM

## 2022-08-09 DIAGNOSIS — M25.661 KNEE STIFFNESS, RIGHT: ICD-10-CM

## 2022-08-09 DIAGNOSIS — M25.561 ACUTE PAIN OF RIGHT KNEE: ICD-10-CM

## 2022-08-09 DIAGNOSIS — R26.89 UNSTABLE BALANCE: ICD-10-CM

## 2022-08-09 DIAGNOSIS — R26.2 DIFFICULTY IN WALKING: Primary | ICD-10-CM

## 2022-08-09 PROCEDURE — 97110 THERAPEUTIC EXERCISES: CPT | Performed by: PHYSICAL THERAPIST

## 2022-08-09 NOTE — PROGRESS NOTES
had another bowel movement since then. SUBJECTIVE:  Patient reports \"I think everything was okay\". States she is trying to walk in her house without her cane, however notes she has to have an assistive device when getting up during the night to go to the bathroom. OBJECTIVE:     Today's treatment consisted of[de-identified]    Therapeutic exercise (14543) x 50 min to develop strength in the right quadriceps   Upright bike x 8', L1, 4 holes  Iso Knee Extensions through various ranges; 90°, 60°, 45° with NMES  VMO LAQs, 20# with NMES cont. Leg Press with Hip ADD, 70# - 3x10 (NMES cont.)  Unilateral leg press @ 40# - 3x10 (NMES cont.)  Ambulating without use of an assistive device, CGA x 1 - 4x84'      ASSESSMENT:  Patient did not achieve visible quadriceps contraction with NMES despite intensity being high. However she was able to perform unilateral knee extensions to terminal extension. Verbal cuing provided to stabilize at terminal extension, which she did struggle to perform. Patient expressed low confidence with ambulating without an assistive device and displayed a strong tendency to cruise on nearby equipment. However when cued to walk with increased pace, she showed no instability and stayed on path. Long term goals to be met by 8/24/2022  Pt. will demonstrate normal/unassisted ambulation on even terrain for home distances. Patient will report pain no greater than 4/10 to improve performance of MRADLs. Pt. will be able to do simple ADLs independently with min restrictions from knee including bathing, dressing and driving. Patient will demonstrate independent floor transfers for fall safety. Patient will achieve an average score on the Patient-Specific Functional Scale ? 7/10 indicating improving functional mobility.       PLAN: Continue VMO exercises with NMES and add Bridge + ADD, TKEs, and TKE squats; have patient ambulate without her cane in the clinic      Access Code: Maggie Bunch: [email or web address]  Date: 07/25/2022  Prepared by: Vladimir Console, DPT    Exercises  Standing Bilateral Gastroc Stretch with Step - 2 x daily - 7 x weekly - 1 sets - 5 reps - 12 seconds hold  Standing Bilateral Heel Raise on Step - 1 x daily - 5 x weekly - 3 sets - 10 reps  Standing Hamstring Stretch on Chair - 2 x daily - 7 x weekly - 1 sets - 5 reps - 12 seconds hold  Standing Hamstring Curl with Chair Support - 2 x daily - 5 x weekly - 3 sets - 10 reps  Sidelying Quadriceps Stretch with Strap - 2 x daily - 7 x weekly - 1 sets - 5 reps - 12 seconds hold  Supine Heel Slide with Strap - 2 x daily - 7 x weekly - 1 sets - 10 reps - 5 seconds hold  Active Straight Leg Raise with Quad Set - 3 x daily - 5 x weekly - 1 sets - 5 reps - 5 seconds hold  Supine Bridge - 1 x daily - 5 x weekly - 3 sets - 5 reps  Standing Single Leg Stance with Counter Support - 1 x daily - 5 x weekly - 3 sets - 30 seconds hold  Step Up - 1 x daily - 5 x weekly - 3 sets - 10 reps  Lateral Step Up - 1 x daily - 5 x weekly - 3 sets - 10 reps  Staggered Sit-to-Stand - 1 x daily - 5 x weekly - 3 sets - 10 reps  Ice - 2 x daily - 7 x weekly - 15-20 duration (minutes)        [employer]

## 2022-08-11 ENCOUNTER — OFFICE VISIT (OUTPATIENT)
Dept: ORTHOPEDIC SURGERY | Age: 67
End: 2022-08-11
Payer: MEDICARE

## 2022-08-11 DIAGNOSIS — R26.2 DIFFICULTY IN WALKING: Primary | ICD-10-CM

## 2022-08-11 DIAGNOSIS — M25.661 KNEE STIFFNESS, RIGHT: ICD-10-CM

## 2022-08-11 DIAGNOSIS — Z74.09 DECREASED FUNCTIONAL MOBILITY AND ENDURANCE: ICD-10-CM

## 2022-08-11 DIAGNOSIS — R53.1 WEAKNESS GENERALIZED: ICD-10-CM

## 2022-08-11 DIAGNOSIS — M25.561 ACUTE PAIN OF RIGHT KNEE: ICD-10-CM

## 2022-08-11 DIAGNOSIS — R26.89 UNSTABLE BALANCE: ICD-10-CM

## 2022-08-11 PROCEDURE — 97110 THERAPEUTIC EXERCISES: CPT | Performed by: PHYSICAL THERAPIST

## 2022-08-11 PROCEDURE — G0283 ELEC STIM OTHER THAN WOUND: HCPCS | Performed by: PHYSICAL THERAPIST

## 2022-08-11 NOTE — PROGRESS NOTES
[address] [address] [phone]  Physical Therapy Daily Note     Referring MD: Scooter Barone MD  Diagnosis:     ICD-10-CM ICD-9-CM    1. Difficulty in walking  R26.2 719.7    2. Weakness generalized  R53.1 780.79    3. Decreased functional mobility and endurance  Z74.09 780.99    4. Unstable balance  R26.89 781.2    5. Acute pain of right knee  M25.561 719.46    6. Knee stiffness, right  M25.661 719.56      Surgery: Right Knee Arthroplasty Total Robotic Assisted Clayton Shiva Spinal/adductor  DOS:  4/13/2022   Therapy precautions: Fall risk  Co-morbidities affecting plan of care: Vertigo    Total Timed Codes: 35 min, Total Treatment Time: 50 min  Time In: 02:30 PM  Time Out: 03:20 PM      Patient History    Past medical and surgical history:   Past Medical History:   Diagnosis Date    Arthritis     pinched nerve in neck    GERD (gastroesophageal reflux disease)     well controlled with omeprazole daily--    Hypertension     managed with medication     Iron deficiency anemia     takes oral Fe- denies hx of blood transfusion    Osteoarthritis     Vertigo     Meclizine PRN       Past Surgical History:   Procedure Laterality Date    HX COLONOSCOPY      HX HYSTERECTOMY  1995    HX ORTHOPAEDIC Left 02/2020    LEFT 3RD TOE CORRECTION OF DEFORMED TOE     HX TONSILLECTOMY       Medications: reviewed in chart   Allergies: Allergies   Allergen Reactions    Latex Rash    Atorvastatin Rash    Nexium [Esomeprazole Magnesium] Rash    Other Medication Rash     Silk tape    Seafood [Shellfish Containing Products] Rash        Chief complaints/history of injury: Patient is a 79 y.o. female with a PMH complicated as noted above. She presents to PT 3 weeks s/p right TKA. She has completed HHPT with no post-op complications. However notes last week she noted possible blood in her bowel movement. States it was on one stool but not on the others.   She has not had another bowel movement since then. SUBJECTIVE:  Patient reports she attempted to increase her walking pace on the treadmill however \"I was awful\". Reports partial compliance with her HEP. OBJECTIVE:   Findings[de-identified]  Innominate Assessment Right   ASIS: High   Long Sitting Test: Short to Long      Today's treatment consisted of[de-identified]    Unattended electrical stimulation (86111/) x 15 minutes to right quadriceps in order to reduce pain and muscle spasms associated with increased activities in therapy as well as help reduce delayed pain episodes. Iso Knee Extensions @ 60° with NMES - 10x10\"  LAQs, 20# with NMES - 5x5    Therapeutic exercise (79470) x 35 min to develop strength in the right quadriceps   Upright bike x 8', L1, 4 holes  Bridge with hip ADD - 2x10  Innominate assessment and correction  Ambulating without use of an assistive device, CGA x 1 - 4x84'      ASSESSMENT:  Patient continues to show improved quality of gait when walking with pace. Presented with a posteriorly rotated right innominate which we were able to correct with MET, however her asymmetrical gait may be the cause the malalignment. No update to HEP today d/t current program still being sufficient. Long term goals to be met by 8/24/2022  Pt. will demonstrate normal/unassisted ambulation on even terrain for home distances. Patient will report pain no greater than 4/10 to improve performance of MRADLs. Pt. will be able to do simple ADLs independently with min restrictions from knee including bathing, dressing and driving. Patient will demonstrate independent floor transfers for fall safety. Patient will achieve an average score on the Patient-Specific Functional Scale ? 7/10 indicating improving functional mobility. PLAN: Educate patient on TENs unit and reassess innominates      Access Code: Brook Garcia  URL: [email or web address]  Date: 07/25/2022  Prepared by: Mazin Sommer DPT    Exercises  Standing Bilateral Gastroc Stretch with Step - 2 x daily - 7 x weekly - 1 sets - 5 reps - 12 seconds hold  Standing Bilateral Heel Raise on Step - 1 x daily - 5 x weekly - 3 sets - 10 reps  Standing Hamstring Stretch on Chair - 2 x daily - 7 x weekly - 1 sets - 5 reps - 12 seconds hold  Standing Hamstring Curl with Chair Support - 2 x daily - 5 x weekly - 3 sets - 10 reps  Sidelying Quadriceps Stretch with Strap - 2 x daily - 7 x weekly - 1 sets - 5 reps - 12 seconds hold  Supine Heel Slide with Strap - 2 x daily - 7 x weekly - 1 sets - 10 reps - 5 seconds hold  Active Straight Leg Raise with Quad Set - 3 x daily - 5 x weekly - 1 sets - 5 reps - 5 seconds hold  Supine Bridge - 1 x daily - 5 x weekly - 3 sets - 5 reps  Standing Single Leg Stance with Counter Support - 1 x daily - 5 x weekly - 3 sets - 30 seconds hold  Step Up - 1 x daily - 5 x weekly - 3 sets - 10 reps  Lateral Step Up - 1 x daily - 5 x weekly - 3 sets - 10 reps  Staggered Sit-to-Stand - 1 x daily - 5 x weekly - 3 sets - 10 reps  Ice - 2 x daily - 7 x weekly - 15-20 duration (minutes)        [employer]

## 2022-08-16 ENCOUNTER — OFFICE VISIT (OUTPATIENT)
Dept: ORTHOPEDIC SURGERY | Age: 67
End: 2022-08-16
Payer: MEDICARE

## 2022-08-16 DIAGNOSIS — R26.89 UNSTABLE BALANCE: ICD-10-CM

## 2022-08-16 DIAGNOSIS — R53.1 WEAKNESS GENERALIZED: ICD-10-CM

## 2022-08-16 DIAGNOSIS — Z74.09 DECREASED FUNCTIONAL MOBILITY AND ENDURANCE: ICD-10-CM

## 2022-08-16 DIAGNOSIS — R26.2 DIFFICULTY IN WALKING: Primary | ICD-10-CM

## 2022-08-16 DIAGNOSIS — M25.661 KNEE STIFFNESS, RIGHT: ICD-10-CM

## 2022-08-16 DIAGNOSIS — M25.561 ACUTE PAIN OF RIGHT KNEE: ICD-10-CM

## 2022-08-16 PROCEDURE — 97110 THERAPEUTIC EXERCISES: CPT | Performed by: PHYSICAL THERAPIST

## 2022-08-16 NOTE — PROGRESS NOTES
had another bowel movement since then. SUBJECTIVE:  Patient reports she was able to use her TENs unit for muscle activation of her right quadriceps. OBJECTIVE:   Findings[de-identified]  Innominate Assessment Right   ASIS: High   Long Sitting Test: Short to Long      Today's treatment consisted of[de-identified]    Therapeutic exercise (84760) x 40 min to develop strength in the right quadriceps   Upright bike x 6', L1, 4 holes  Bilateral Leg Press @ 80# - 5x10  Unilateral leg press @ 40# - 5x10  Sit to stands from high seat, stagger stance - 3x10  Marching with UUE support      ASSESSMENT:  Patient presented 10 minutes late to therapy and therefore was given a modified treatment today. She was instructed to increase her walking time at home to 8 minutes sessions with the goal being to be able to walk 30 minutes sessions 5 days per week. She demonstrated poor form and technique with a stand to sit transfer, therefore treatment focused on sit to stand techniques to improve ability at home. Long term goals to be met by 8/24/2022  Pt. will demonstrate normal/unassisted ambulation on even terrain for home distances. Patient will report pain no greater than 4/10 to improve performance of MRADLs. Pt. will be able to do simple ADLs independently with min restrictions from knee including bathing, dressing and driving. Patient will demonstrate independent floor transfers for fall safety. Patient will achieve an average score on the Patient-Specific Functional Scale ? 7/10 indicating improving functional mobility. PLAN: Update HEP      Access Code: Michael Almanza  URL: [email or web address]  Date: 07/25/2022  Prepared by: Shivam Lara DPT    Exercises  Standing Bilateral Gastroc Stretch with Step - 2 x daily - 7 x weekly - 1 sets - 5 reps - 12 seconds hold  Standing Bilateral Heel Raise on Step - 1 x daily - 5 x weekly - 3 sets - 10 reps  Standing Hamstring Stretch on Chair - 2 x daily - 7 x weekly - 1 sets - 5 reps - 12 seconds hold  Standing Hamstring Curl with Chair Support - 2 x daily - 5 x weekly - 3 sets - 10 reps  Sidelying Quadriceps Stretch with Strap - 2 x daily - 7 x weekly - 1 sets - 5 reps - 12 seconds hold  Supine Heel Slide with Strap - 2 x daily - 7 x weekly - 1 sets - 10 reps - 5 seconds hold  Active Straight Leg Raise with Quad Set - 3 x daily - 5 x weekly - 1 sets - 5 reps - 5 seconds hold  Supine Bridge - 1 x daily - 5 x weekly - 3 sets - 5 reps  Standing Single Leg Stance with Counter Support - 1 x daily - 5 x weekly - 3 sets - 30 seconds hold  Step Up - 1 x daily - 5 x weekly - 3 sets - 10 reps  Lateral Step Up - 1 x daily - 5 x weekly - 3 sets - 10 reps  Staggered Sit-to-Stand - 1 x daily - 5 x weekly - 3 sets - 10 reps  Ice - 2 x daily - 7 x weekly - 15-20 duration (minutes)        [facility]

## 2022-08-18 ENCOUNTER — OFFICE VISIT (OUTPATIENT)
Dept: ORTHOPEDIC SURGERY | Age: 67
End: 2022-08-18
Payer: MEDICARE

## 2022-08-18 DIAGNOSIS — R53.1 WEAKNESS GENERALIZED: ICD-10-CM

## 2022-08-18 DIAGNOSIS — M25.561 ACUTE PAIN OF RIGHT KNEE: ICD-10-CM

## 2022-08-18 DIAGNOSIS — R26.2 DIFFICULTY IN WALKING: Primary | ICD-10-CM

## 2022-08-18 DIAGNOSIS — Z74.09 DECREASED FUNCTIONAL MOBILITY AND ENDURANCE: ICD-10-CM

## 2022-08-18 DIAGNOSIS — R26.89 UNSTABLE BALANCE: ICD-10-CM

## 2022-08-18 DIAGNOSIS — M25.661 KNEE STIFFNESS, RIGHT: ICD-10-CM

## 2022-08-18 PROCEDURE — 97110 THERAPEUTIC EXERCISES: CPT | Performed by: PHYSICAL THERAPIST

## 2022-08-18 NOTE — PROGRESS NOTES
[address] [address] [phone]  Physical Therapy Daily Note     Referring MD: Alexa Ozuna MD  Diagnosis:     ICD-10-CM ICD-9-CM    1. Difficulty in walking  R26.2 719.7    2. Weakness generalized  R53.1 780.79    3. Decreased functional mobility and endurance  Z74.09 780.99    4. Unstable balance  R26.89 781.2    5. Acute pain of right knee  M25.561 719.46    6. Knee stiffness, right  M25.661 719.56      Surgery: Right Knee Arthroplasty Total Robotic Assisted Chidester Shiva Spinal/adductor  DOS:  4/13/2022   Therapy precautions: Fall risk  Co-morbidities affecting plan of care: Vertigo    Total Timed Codes: 40 min, Total Treatment Time: 40 min  Time In: 03:20 PM  Time Out: 04:00 PM      Patient History    Past medical and surgical history:   Past Medical History:   Diagnosis Date    Arthritis     pinched nerve in neck    GERD (gastroesophageal reflux disease)     well controlled with omeprazole daily--    Hypertension     managed with medication     Iron deficiency anemia     takes oral Fe- denies hx of blood transfusion    Osteoarthritis     Vertigo     Meclizine PRN       Past Surgical History:   Procedure Laterality Date    HX COLONOSCOPY      HX HYSTERECTOMY  1995    HX ORTHOPAEDIC Left 02/2020    LEFT 3RD TOE CORRECTION OF DEFORMED TOE     HX TONSILLECTOMY       Medications: reviewed in chart   Allergies: Allergies   Allergen Reactions    Latex Rash    Atorvastatin Rash    Nexium [Esomeprazole Magnesium] Rash    Other Medication Rash     Silk tape    Seafood [Shellfish Containing Products] Rash        Chief complaints/history of injury: Patient is a 79 y.o. female with a PMH complicated as noted above. She presents to PT 3 weeks s/p right TKA. She has completed HHPT with no post-op complications. However notes last week she noted possible blood in her bowel movement. States it was on one stool but not on the others.   She has not hold  Standing Bilateral Heel Raise on Step - 1 x daily - 5 x weekly - 3 sets - 10 reps  Standing Hamstring Stretch on Chair - 2 x daily - 7 x weekly - 1 sets - 5 reps - 12 seconds hold  Standing Hamstring Curl with Chair Support - 2 x daily - 5 x weekly - 3 sets - 12 reps  Sidelying Quadriceps Stretch with Strap - 2 x daily - 7 x weekly - 1 sets - 5 reps - 12 seconds hold  Supine Heel Slide with Strap - 2 x daily - 7 x weekly - 1 sets - 10 reps - 5 seconds hold  Heel Toe Raises with Counter Support - 1 x daily - 5 x weekly - 3 sets - 10 reps  Seated Long Arc Quad - 2 x daily - 5 x weekly - 3 sets - 10 reps - yellow theraband  Standing Single Leg Stance with Counter Support - 1 x daily - 5 x weekly - 3 sets - 30 seconds hold  Squat with Chair and Counter Support - 1 x daily - 5 x weekly - 2-3 sets - 10 reps  Walking - 2 x daily - 5 x weekly - 8 minutes duration  Ice - 2 x daily - 7 x weekly - 15-20 duration (minutes)          [employer]

## 2022-08-23 ENCOUNTER — OFFICE VISIT (OUTPATIENT)
Dept: ORTHOPEDIC SURGERY | Age: 67
End: 2022-08-23
Payer: MEDICARE

## 2022-08-23 DIAGNOSIS — Z74.09 DECREASED FUNCTIONAL MOBILITY AND ENDURANCE: ICD-10-CM

## 2022-08-23 DIAGNOSIS — M25.661 KNEE STIFFNESS, RIGHT: ICD-10-CM

## 2022-08-23 DIAGNOSIS — R26.89 UNSTABLE BALANCE: ICD-10-CM

## 2022-08-23 DIAGNOSIS — R26.2 DIFFICULTY IN WALKING: Primary | ICD-10-CM

## 2022-08-23 DIAGNOSIS — R53.1 WEAKNESS GENERALIZED: ICD-10-CM

## 2022-08-23 DIAGNOSIS — M25.561 ACUTE PAIN OF RIGHT KNEE: ICD-10-CM

## 2022-08-23 PROCEDURE — G0283 ELEC STIM OTHER THAN WOUND: HCPCS | Performed by: PHYSICAL THERAPIST

## 2022-08-23 PROCEDURE — 97016 VASOPNEUMATIC DEVICE THERAPY: CPT | Performed by: PHYSICAL THERAPIST

## 2022-08-23 PROCEDURE — 97035 APP MDLTY 1+ULTRASOUND EA 15: CPT | Performed by: PHYSICAL THERAPIST

## 2022-08-23 PROCEDURE — 97140 MANUAL THERAPY 1/> REGIONS: CPT | Performed by: PHYSICAL THERAPIST

## 2022-08-23 NOTE — PROGRESS NOTES
[address] [address] [phone]  Physical Therapy Daily Note     Referring MD: Alexa Ozuna MD  Diagnosis:     ICD-10-CM ICD-9-CM    1. Difficulty in walking  R26.2 719.7    2. Weakness generalized  R53.1 780.79    3. Decreased functional mobility and endurance  Z74.09 780.99    4. Unstable balance  R26.89 781.2    5. Acute pain of right knee  M25.561 719.46    6. Knee stiffness, right  M25.661 719.56      Surgery: Right Knee Arthroplasty Total Robotic Assisted Ogilvie Shiva Spinal/adductor  DOS:  4/13/2022   Therapy precautions: Fall risk  Co-morbidities affecting plan of care: Vertigo    Total Timed Codes: 31 min, Total Treatment Time: 58 min  Time In: 01:52 PM  Time Out: 02:50 PM      Patient History    Past medical and surgical history:   Past Medical History:   Diagnosis Date    Arthritis     pinched nerve in neck    GERD (gastroesophageal reflux disease)     well controlled with omeprazole daily--    Hypertension     managed with medication     Iron deficiency anemia     takes oral Fe- denies hx of blood transfusion    Osteoarthritis     Vertigo     Meclizine PRN       Past Surgical History:   Procedure Laterality Date    HX COLONOSCOPY      HX HYSTERECTOMY  1995    HX ORTHOPAEDIC Left 02/2020    LEFT 3RD TOE CORRECTION OF DEFORMED TOE     HX TONSILLECTOMY       Medications: reviewed in chart   Allergies: Allergies   Allergen Reactions    Latex Rash    Atorvastatin Rash    Nexium [Esomeprazole Magnesium] Rash    Other Medication Rash     Silk tape    Seafood [Shellfish Containing Products] Rash        Chief complaints/history of injury: Patient is a 79 y.o. female with a PMH complicated as noted above. She presents to PT 3 weeks s/p right TKA. She has completed HHPT with no post-op complications. However notes last week she noted possible blood in her bowel movement. States it was on one stool but not on the others.   She has not had another bowel movement since then. SUBJECTIVE:  Patient reports onset of 9-10/10 right knee yesterday for no known reason. Reports self treatment with ice and Tylenol which helped her to sleep. OBJECTIVE:     Today's treatment consisted of[de-identified]    Therapeutic exercise (98912) x 6 min to develop strength in the right quadriceps   NuStep x 6', L3    Ultrasound (76138) x 10 minutes to medial and lateral right knee, at 1 MHz, 1.2 w/cm2  assisting in reducing pain, muscle spasm/soft tissue restrictions. Manual therapy (02961) x 15 min utilizing techniques to improve joint and/or soft tissue mobility, ROM, and function as well as helping to decrease pain/spasms and swelling. Palpation and assessment of soft tissue, muscles, and landmarks   STM to right quadriceps and patellar intervals    Unattended electrical stimulation (41092/) with vasopneumatic compression x 15 minutes to right knee in order to reduce pain and muscle spasms associated with increased activities in therapy as well as help reduce delayed pain episodes. ASSESSMENT:  Treatment focused on pain management today secondary to patient reported persistent severe pain. Discussed discharge plan to discontinue therapy for 1 month to allow patient to try and manage independently with her HEP. If she feels she needs therapy after that month off, she can contact the office for a new order. Patient expressed agreement with plan. Patient reported pain decreased to 5-6/10 following treatment. Long term goals to be met by 8/24/2022  Pt. will demonstrate normal/unassisted ambulation on even terrain for home distances. Patient will report pain no greater than 4/10 to improve performance of MRADLs. Pt. will be able to do simple ADLs independently with min restrictions from knee including bathing, dressing and driving. Patient will demonstrate independent floor transfers for fall safety.   Patient will achieve an average score on the Patient-Specific Functional Scale ? 7/10 indicating improving functional mobility. PLAN: Re-eval      Access Code: [email or web address]  URL: [email or web address]  Date: 08/18/2022  Prepared by: Rsehma Romero DPT    Exercises  Standing Bilateral Gastroc Stretch with Step - 2 x daily - 7 x weekly - 1 sets - 5 reps - 12 seconds hold  Standing Bilateral Heel Raise on Step - 1 x daily - 5 x weekly - 3 sets - 10 reps  Standing Hamstring Stretch on Chair - 2 x daily - 7 x weekly - 1 sets - 5 reps - 12 seconds hold  Standing Hamstring Curl with Chair Support - 2 x daily - 5 x weekly - 3 sets - 12 reps  Sidelying Quadriceps Stretch with Strap - 2 x daily - 7 x weekly - 1 sets - 5 reps - 12 seconds hold  Supine Heel Slide with Strap - 2 x daily - 7 x weekly - 1 sets - 10 reps - 5 seconds hold  Heel Toe Raises with Counter Support - 1 x daily - 5 x weekly - 3 sets - 10 reps  Seated Long Arc Quad - 2 x daily - 5 x weekly - 3 sets - 10 reps - yellow theraband  Standing Single Leg Stance with Counter Support - 1 x daily - 5 x weekly - 3 sets - 30 seconds hold  Squat with Chair and Counter Support - 1 x daily - 5 x weekly - 2-3 sets - 10 reps  Walking - 2 x daily - 5 x weekly - 8 minutes duration  Ice - 2 x daily - 7 x weekly - 15-20 duration (minutes)          [employer]

## 2022-08-25 ENCOUNTER — OFFICE VISIT (OUTPATIENT)
Dept: ORTHOPEDIC SURGERY | Age: 67
End: 2022-08-25
Payer: MEDICARE

## 2022-08-25 ENCOUNTER — TELEPHONE (OUTPATIENT)
Dept: ORTHOPEDIC SURGERY | Age: 67
End: 2022-08-25

## 2022-08-25 DIAGNOSIS — R26.2 DIFFICULTY IN WALKING: ICD-10-CM

## 2022-08-25 DIAGNOSIS — M25.561 ACUTE PAIN OF RIGHT KNEE: ICD-10-CM

## 2022-08-25 DIAGNOSIS — Z74.09 DECREASED FUNCTIONAL MOBILITY AND ENDURANCE: ICD-10-CM

## 2022-08-25 DIAGNOSIS — R53.1 WEAKNESS GENERALIZED: ICD-10-CM

## 2022-08-25 DIAGNOSIS — M25.661 KNEE STIFFNESS, RIGHT: ICD-10-CM

## 2022-08-25 DIAGNOSIS — R26.89 UNSTABLE BALANCE: Primary | ICD-10-CM

## 2022-08-25 PROCEDURE — 97110 THERAPEUTIC EXERCISES: CPT | Performed by: PHYSICAL THERAPIST

## 2022-08-25 NOTE — PROGRESS NOTES
[address] [address] [phone]  Physical Therapy Progress Note     Referring MD: Vicki Newton MD  Diagnosis:     ICD-10-CM ICD-9-CM    1. Difficulty in walking  R26.2 719.7    2. Weakness generalized  R53.1 780.79    3. Decreased functional mobility and endurance  Z74.09 780.99    4. Unstable balance  R26.89 781.2    5. Acute pain of right knee  M25.561 719.46    6. Knee stiffness, right  M25.661 719.56      Surgery: Right Knee Arthroplasty Total Robotic Assisted Isaiah Shiva Spinal/adductor  DOS:  4/13/2022   Therapy precautions: Fall risk  Co-morbidities affecting plan of care: Vertigo    Total Timed Codes: 45 min, Total Treatment Time: 45 min  Time In: 03:15 PM  Time Out: 04:00 PM      Patient History    Past medical and surgical history:   Past Medical History:   Diagnosis Date    Arthritis     pinched nerve in neck    GERD (gastroesophageal reflux disease)     well controlled with omeprazole daily--    Hypertension     managed with medication     Iron deficiency anemia     takes oral Fe- denies hx of blood transfusion    Osteoarthritis     Vertigo     Meclizine PRN       Past Surgical History:   Procedure Laterality Date    HX COLONOSCOPY      HX HYSTERECTOMY  1995    HX ORTHOPAEDIC Left 02/2020    LEFT 3RD TOE CORRECTION OF DEFORMED TOE     HX TONSILLECTOMY       Medications: reviewed in chart   Allergies: Allergies   Allergen Reactions    Latex Rash    Atorvastatin Rash    Nexium [Esomeprazole Magnesium] Rash    Other Medication Rash     Silk tape    Seafood [Shellfish Containing Products] Rash        Chief complaints/history of injury: Patient is a 79 y.o. female with a PMH complicated as noted above. She presents to PT 3 weeks s/p right TKA. She has completed HHPT with no post-op complications. However notes last week she noted possible blood in her bowel movement. States it was on one stool but not on the others.   She has not had another bowel movement since then. SUBJECTIVE:  Patient reports her knee felt \"really nice\" following her last therapy session and lasted for a few days until she had to care for her mother which required stair negotiation and caregiving duties for ADLs and mobility. RE-EVAL:    Nature of condition: Chronic (continuous duration > 3 months)  Describe current symptoms: tightening in the right knee, occasional instability during ambulation as well as pain. States she does her home duties slowly and sits often when her knees feel unstable. Pain Assessment:  Pain location: Medial right knee  pain and lateral tightness    Average Pain/symptom intensity (0-10 scale)  Last 24 hours: 8/10  Last week (1-7 days): 8-9/10  How often do you feel symptoms? Constant (% of time)    How much have your symptoms interfered with daily activities? A little bit  How has your condition changed since receiving care at this facility? Better  In general, would you say your current overall health is fair    Functional Outcome Measure:   LEFS 5/3/2022 5/31/2022 6/28/2022 7/25/2022 8/25/2022   SCORE 16/80 32/80 30/80 32/80 39/80   PERCENT DEFICIT 80% 60% 62% 60% 51%         Outcome Measure: The Patient-Specific Functional Scale: Activity: 5/31/2022 6/28/2022 7/25/2022 8/25/2022 Comments   1. Independent/Unassisted ambulation for home and community ambulation 7/10 8/10 8-9/10 5/10  Patient reports bilateral knee pain with ambulation, occasional instability in her knees, and a fear of falling   2. Return to premorbid sleeping pattern 8-9/10 8/10 8-9/10 5/10  Has to take pain medication   3. Driving 0/33 8/38 4-0/24 5/10     Average score: 5.17/10 5.33/10 6.5/10 5/10            Interpretation of Score: The Patient-Specific functional scale is used to quantify activity limitation and measure functional outcome for patients with any orthopaedic condition.  Each activity is scored 0-10, 0 representing unable to perform activity and 10 able to perform activity at the same level as before injury or problem. Minimum detectable change is 2 points for average score and 3 points for single activity score. A/PROM: RIGHT LEFT             5/31/2022 6/28/2022 7/25/2022 8/25/2022 Quality of Movement   Knee: 3°- 88°/0°- 92° 1°- 110° 0°- 107°/0°- 115° 0°- 110°/0°- 120° 0°- 110°/0°- 120° 0°- 110°/0°- 114° Empty End Feel         MMT: RIGHT LEFT 5/31/2022 6/28/2022 7/25/2022 8/25/2022 Quality of Testing   HIP FLEX: 3/5 3+/5 3+/5 3+/5 3+/5 3/5     HIP ABD: 2+/5 2+/5 2/5 3/5 3/5 2+/5     HIP EXT: 2+/5 2+/5 2/5 2/5 2/5 2/5     KNEE EXT: 2/5 4/5 4-/5 4/5 4-/5 4-/5    KNEE FLEX: 2/5 4/5 4-/5 4-/5 4-/5 4-/5     DF: 3/5 5/5 4/5 4+/5 4+/5 4/5     PF: 3/5 5/5 4/5 4+/5 4+/5 4/5        Gait:   Assistive Device Cane   Initial Contact Decreased Heel Strike   Mid-stance Achieves flat foot   Terminal Stance Normal   Swing Phase Passes stance leg   Gait Speed Below Functional Limits   Quality Non-antalgic      Stair Management:  Ascending  Bilateral HR and Step-to leading (R)   Descending  Bilateral HR and Step-to leading (R), Backwards      TUG 5/3/2022 5/31/2022 6/28/2022 7/25/2022 8/25/2022   Time: 56 seconds 28 seconds 20.67 seconds 20.67 seconds 18 seconds   Assistive Device Used: Rolling Walker TXU Avtar None   Time to stand up, walk a distance of 10 feet, turn around, walk back to the chair and sit down again.      Predictive Results     Seconds                Rating  <10                        Freely mobile  <20                        Mostly independent  20-19                     Variable mobility  >20                        Impaired mobility       SLS Balance: Unable to assess d/t pain    Sit to Stand Transfer Characteristics   Level of Assistance Modified Independent    UE Support BUE support   Weight Shift Lacks anterior weight   Dynamic Knee Valgus None   Attempts One        OBJECTIVE:     Today's treatment consisted of[de-identified]    Therapeutic exercise (62155) x 45 min to develop strength in the right quadriceps   NuStep x 6', L3  Re-eval      ASSESSMENT:  Patient has completed 16 weeks of PT.  EOC includes 0 CXL and 0 no shows. She has progressed slower than expected  with treatment meeting 0 of 5 LTGs at this time. She has subjective reports of Constant severe pain and has further improved functional deficit by 9%. Objective findings revealed AROM is [place] however significant weakness noted in the right hip. Patient continues to perform community ambulation with a straight cane however is able to perform home ambulation with modified independence. Overall deficits include:  Pain, Loss in ROM, Generalized Weakness, Abnormal Ambulation, Decreased Endurance, Impaired Balance/Proprioception, and Edema  These impairments interfere with the patient's ability to:  Perform Home Duties, Safely Ambulate, Perform Stair Negotiation, Perform Transfers, Perform ADLs & IADLs, and Participate in Recreation Activities  Patient was advised to rest for several days to allow her pain to calm down to no greater than 5/10. We discussed taking a break from PT for about a month. Patient will continue to work with her HEP and will return to PT if she continues to lack progress or experiences regression. Patient expressed agreement with discharge plan. Long term goals to be met by 8/24/2022  Pt. will demonstrate normal/unassisted ambulation on even terrain for home distances  - NOT MET: 8/28/22    Patient will report pain no greater than 4/10 to improve performance of MRADLs. - NOT MET: 8/28/22    Pt. will be able to do simple ADLs independently with min restrictions from knee including bathing, dressing and driving. - NOT MET: 5/04/94   Patient will demonstrate independent floor transfers for fall safety. - NOT MET: 8/28/22     Patient will achieve an average score on the Patient-Specific Functional Scale ?  7/10 indicating improving functional mobility. - NOT MET: 8/28/22        PLAN: D/C to [facility]      Access Code: [email or web address]  URL: [email or web address]  Date: 08/18/2022  Prepared by: Ed Haskins DPT    Exercises  Standing Bilateral Gastroc Stretch with Step - 2 x daily - 7 x weekly - 1 sets - 5 reps - 12 seconds hold  Standing Bilateral Heel Raise on Step - 1 x daily - 5 x weekly - 3 sets - 10 reps  Standing Hamstring Stretch on Chair - 2 x daily - 7 x weekly - 1 sets - 5 reps - 12 seconds hold  Standing Hamstring Curl with Chair Support - 2 x daily - 5 x weekly - 3 sets - 12 reps  Sidelying Quadriceps Stretch with Strap - 2 x daily - 7 x weekly - 1 sets - 5 reps - 12 seconds hold  Supine Heel Slide with Strap - 2 x daily - 7 x weekly - 1 sets - 10 reps - 5 seconds hold  Heel Toe Raises with Counter Support - 1 x daily - 5 x weekly - 3 sets - 10 reps  Seated Long Arc Quad - 2 x daily - 5 x weekly - 3 sets - 10 reps - yellow theraband  Standing Single Leg Stance with Counter Support - 1 x daily - 5 x weekly - 3 sets - 30 seconds hold  Squat with Chair and Counter Support - 1 x daily - 5 x weekly - 2-3 sets - 10 reps  Walking - 2 x daily - 5 x weekly - 8 minutes duration  Ice - 2 x daily - 7 x weekly - 15-20 duration (minutes)          [employer]

## 2022-08-25 NOTE — TELEPHONE ENCOUNTER
[facility]  [phone]   Physical Therapy Cancellation/No Show            Pt did not show for their scheduled therapy appointment today.     Reason: Unknown  Communication:  Left voice message for patient to call and reschedule

## 2022-10-27 ENCOUNTER — OFFICE VISIT (OUTPATIENT)
Dept: ORTHOPEDIC SURGERY | Age: 67
End: 2022-10-27

## 2022-10-27 DIAGNOSIS — Z96.651 STATUS POST RIGHT KNEE REPLACEMENT: Primary | ICD-10-CM

## 2022-10-27 NOTE — PROGRESS NOTES
Post-op TKA Visit      10/27/22     Not on File  No current outpatient medications on file prior to visit. No current facility-administered medications on file prior to visit. 6 months Status Post right TKA     History: The patient returns today for post-op visit following right TKA. Their pain is improving. They are ambulating with no walking aid. They have completed home physical therapy. She is having some persistent swelling. She has had this since prior to knee surgery. No history of ill feeling fever or chills. She is very happy with her results. Physical Exam:  The patient's incision is well healed. There is mild swelling and  warmth. ROM is 0 to 115 degrees. There is no M/L or A/P instability. The calf is soft and non-tender. Neurologic and vascular exams are intact. Ambulates well. X-Rays: AP and Lateral x-rays of right reveals a cementless TKA, Sharon prosthesis. There is a patella arthroplasty. There is good alignment and good position of the components. X-Ray Diagnosis: Satisfactory appearance right TKA    Disposition: The patient is doing well following right TKA. They will continue physical therapy exercises. The patient was advised about fall precautions and dental prophylaxis. The patient will follow up as scheduled or sooner if needed. Follow up in 3-4 years. She will let us know when shes ready to schedule her other knee.

## 2024-01-22 DIAGNOSIS — M17.12 PRIMARY OSTEOARTHRITIS OF LEFT KNEE: Primary | ICD-10-CM

## 2024-03-21 ENCOUNTER — PREP FOR PROCEDURE (OUTPATIENT)
Dept: ORTHOPEDIC SURGERY | Age: 69
End: 2024-03-21

## 2024-03-21 DIAGNOSIS — M17.12 PRIMARY OSTEOARTHRITIS OF LEFT KNEE: Primary | ICD-10-CM

## 2024-03-21 RX ORDER — SODIUM CHLORIDE 0.9 % (FLUSH) 0.9 %
5-40 SYRINGE (ML) INJECTION EVERY 12 HOURS SCHEDULED
Status: CANCELLED | OUTPATIENT
Start: 2024-03-21

## 2024-03-21 RX ORDER — ACETAMINOPHEN 325 MG/1
1000 TABLET ORAL ONCE
Status: CANCELLED | OUTPATIENT
Start: 2024-03-21 | End: 2024-03-21

## 2024-03-21 RX ORDER — SODIUM CHLORIDE 0.9 % (FLUSH) 0.9 %
5-40 SYRINGE (ML) INJECTION PRN
Status: CANCELLED | OUTPATIENT
Start: 2024-03-21

## 2024-03-21 RX ORDER — SODIUM CHLORIDE 9 MG/ML
INJECTION, SOLUTION INTRAVENOUS PRN
Status: CANCELLED | OUTPATIENT
Start: 2024-03-21

## 2024-03-26 ENCOUNTER — HOSPITAL ENCOUNTER (OUTPATIENT)
Dept: SURGERY | Age: 69
Discharge: HOME | End: 2024-03-29
Payer: MEDICARE

## 2024-03-26 ENCOUNTER — HOSPITAL ENCOUNTER (OUTPATIENT)
Dept: REHABILITATION | Age: 69
Discharge: HOME | End: 2024-03-29
Payer: MEDICARE

## 2024-03-26 VITALS
WEIGHT: 209.88 LBS | TEMPERATURE: 97.3 F | HEART RATE: 69 BPM | BODY MASS INDEX: 31.81 KG/M2 | HEIGHT: 68 IN | SYSTOLIC BLOOD PRESSURE: 144 MMHG | RESPIRATION RATE: 16 BRPM | OXYGEN SATURATION: 99 % | DIASTOLIC BLOOD PRESSURE: 77 MMHG

## 2024-03-26 DIAGNOSIS — M17.12 PRIMARY OSTEOARTHRITIS OF LEFT KNEE: Primary | ICD-10-CM

## 2024-03-26 DIAGNOSIS — M17.12 PRIMARY OSTEOARTHRITIS OF LEFT KNEE: ICD-10-CM

## 2024-03-26 LAB
ANION GAP SERPL CALC-SCNC: 4 MMOL/L (ref 2–11)
APTT PPP: 28.4 SEC (ref 23.3–37.4)
BASOPHILS # BLD: 0 K/UL (ref 0–0.2)
BASOPHILS NFR BLD: 1 % (ref 0–2)
BUN SERPL-MCNC: 11 MG/DL (ref 8–23)
CALCIUM SERPL-MCNC: 9.3 MG/DL (ref 8.3–10.4)
CHLORIDE SERPL-SCNC: 107 MMOL/L (ref 103–113)
CO2 SERPL-SCNC: 27 MMOL/L (ref 21–32)
CREAT SERPL-MCNC: 0.8 MG/DL (ref 0.6–1)
DIFFERENTIAL METHOD BLD: NORMAL
EKG ATRIAL RATE: 60 BPM
EKG DIAGNOSIS: NORMAL
EKG P AXIS: 33 DEGREES
EKG P-R INTERVAL: 142 MS
EKG Q-T INTERVAL: 392 MS
EKG QRS DURATION: 90 MS
EKG QTC CALCULATION (BAZETT): 392 MS
EKG R AXIS: 61 DEGREES
EKG T AXIS: 9 DEGREES
EKG VENTRICULAR RATE: 60 BPM
EOSINOPHIL # BLD: 0.1 K/UL (ref 0–0.8)
EOSINOPHIL NFR BLD: 1 % (ref 0.5–7.8)
ERYTHROCYTE [DISTWIDTH] IN BLOOD BY AUTOMATED COUNT: 13.5 % (ref 11.9–14.6)
EST. AVERAGE GLUCOSE BLD GHB EST-MCNC: 108 MG/DL
GLUCOSE SERPL-MCNC: 116 MG/DL (ref 65–100)
HBA1C MFR BLD: 5.4 % (ref 4.8–5.6)
HCT VFR BLD AUTO: 37.3 % (ref 35.8–46.3)
HGB BLD-MCNC: 11.7 G/DL (ref 11.7–15.4)
IMM GRANULOCYTES # BLD AUTO: 0 K/UL (ref 0–0.5)
IMM GRANULOCYTES NFR BLD AUTO: 0 % (ref 0–5)
INR PPP: 1.1
LYMPHOCYTES # BLD: 1.9 K/UL (ref 0.5–4.6)
LYMPHOCYTES NFR BLD: 33 % (ref 13–44)
MCH RBC QN AUTO: 27.1 PG (ref 26.1–32.9)
MCHC RBC AUTO-ENTMCNC: 31.4 G/DL (ref 31.4–35)
MCV RBC AUTO: 86.5 FL (ref 82–102)
MONOCYTES # BLD: 0.4 K/UL (ref 0.1–1.3)
MONOCYTES NFR BLD: 6 % (ref 4–12)
NEUTS SEG # BLD: 3.5 K/UL (ref 1.7–8.2)
NEUTS SEG NFR BLD: 60 % (ref 43–78)
NRBC # BLD: 0 K/UL (ref 0–0.2)
PLATELET # BLD AUTO: 235 K/UL (ref 150–450)
PMV BLD AUTO: 10.6 FL (ref 9.4–12.3)
POTASSIUM SERPL-SCNC: 3.5 MMOL/L (ref 3.5–5.1)
PROTHROMBIN TIME: 13.4 SEC (ref 11.3–14.9)
RBC # BLD AUTO: 4.31 M/UL (ref 4.05–5.2)
SODIUM SERPL-SCNC: 138 MMOL/L (ref 136–146)
WBC # BLD AUTO: 5.9 K/UL (ref 4.3–11.1)

## 2024-03-26 PROCEDURE — 93005 ELECTROCARDIOGRAM TRACING: CPT | Performed by: STUDENT IN AN ORGANIZED HEALTH CARE EDUCATION/TRAINING PROGRAM

## 2024-03-26 PROCEDURE — 85610 PROTHROMBIN TIME: CPT

## 2024-03-26 PROCEDURE — 85730 THROMBOPLASTIN TIME PARTIAL: CPT

## 2024-03-26 PROCEDURE — 87641 MR-STAPH DNA AMP PROBE: CPT

## 2024-03-26 PROCEDURE — 80048 BASIC METABOLIC PNL TOTAL CA: CPT

## 2024-03-26 PROCEDURE — 85025 COMPLETE CBC W/AUTO DIFF WBC: CPT

## 2024-03-26 PROCEDURE — 94760 N-INVAS EAR/PLS OXIMETRY 1: CPT

## 2024-03-26 PROCEDURE — 97161 PT EVAL LOW COMPLEX 20 MIN: CPT

## 2024-03-26 PROCEDURE — 83036 HEMOGLOBIN GLYCOSYLATED A1C: CPT

## 2024-03-26 PROCEDURE — 93010 ELECTROCARDIOGRAM REPORT: CPT | Performed by: INTERNAL MEDICINE

## 2024-03-26 RX ORDER — MULTIVIT-MIN/IRON/FOLIC ACID/K 18-600-40
500 CAPSULE ORAL DAILY
COMMUNITY

## 2024-03-26 RX ORDER — ACETAMINOPHEN 500 MG
500 TABLET ORAL PRN
COMMUNITY

## 2024-03-26 RX ORDER — ALUMINUM HYDROXIDE AND MAGNESIUM CARBONATE 160; 105 MG/1; MG/1
1 TABLET, CHEWABLE ORAL PRN
COMMUNITY

## 2024-03-26 RX ORDER — MELOXICAM 7.5 MG/1
7.5 TABLET ORAL PRN
COMMUNITY
Start: 2022-05-19

## 2024-03-26 RX ORDER — TRIAMCINOLONE ACETONIDE 1 MG/G
CREAM TOPICAL 3 TIMES DAILY PRN
COMMUNITY
Start: 2024-03-06

## 2024-03-26 RX ORDER — ERGOCALCIFEROL 1.25 MG/1
50000 CAPSULE ORAL WEEKLY
COMMUNITY
Start: 2024-03-06

## 2024-03-26 RX ORDER — FLUTICASONE PROPIONATE 50 MCG
2 SPRAY, SUSPENSION (ML) NASAL DAILY
COMMUNITY
Start: 2024-03-06

## 2024-03-26 RX ORDER — MECLIZINE HYDROCHLORIDE 25 MG/1
25 TABLET ORAL 3 TIMES DAILY PRN
COMMUNITY

## 2024-03-26 RX ORDER — LORATADINE 10 MG/1
10 TABLET ORAL PRN
COMMUNITY
Start: 2022-12-02

## 2024-03-26 RX ORDER — PNV NO.95/FERROUS FUM/FOLIC AC 28MG-0.8MG
1 TABLET ORAL
COMMUNITY

## 2024-03-26 RX ORDER — LISINOPRIL AND HYDROCHLOROTHIAZIDE 20; 12.5 MG/1; MG/1
1 TABLET ORAL DAILY
COMMUNITY
Start: 2024-03-06

## 2024-03-26 ASSESSMENT — KOOS JR
STANDING UPRIGHT: MODERATE
RISING FROM SITTING: SEVERE
BENDING TO THE FLOOR TO PICK UP OBJECT: MODERATE
GOING UP OR DOWN STAIRS: SEVERE
KOOS JR TOTAL INTERVAL SCORE: 42.281
STRAIGHTENING KNEE FULLY: MODERATE
TWISING OR PIVOTING ON KNEE: SEVERE
HOW SEVERE IS YOUR KNEE STIFFNESS AFTER FIRST WAKING IN MORNING: SEVERE

## 2024-03-26 ASSESSMENT — PULMONARY FUNCTION TESTS
FEV1 (LITERS): 1.54
FEV1 (%PREDICTED): 73

## 2024-03-26 ASSESSMENT — PAIN SCALES - GENERAL: PAINLEVEL_OUTOF10: 7

## 2024-03-26 NOTE — PERIOP NOTE
PLEASE CONTINUE TAKING ALL PRESCRIPTION MEDICATIONS UP TO THE DAY OF SURGERY UNLESS OTHERWISE DIRECTED BELOW.    DISCONTINUE all vitamins, herbals and supplements 3 weeks prior to surgery. DISCONTINUE Non-Steroidal Anti-Inflammatory (NSAIDS) such as Advil, Ibuprofen, Motrin, Naproxen and Aleve 5 days prior to surgery.       Home Medications to take  the day of surgery    Flonase, Claritin if needed           Home Medications to Hold- please continue all other medications except these.    Stop vitamins and supplements 3 weeks prior to surgery    Stop meloxicam five days prior to surgery      Comments   On the day before surgery please take Acetaminophen 1000mg in the morning and then again before bed. You may substitute for Tylenol 650 mg.      Bring Dynahex wash and Incentive Spirometer with you to hospital on the day of surgery.            Please do not bring home medications with you on the day of surgery unless otherwise directed by your nurse.  If you are instructed to bring home medications, please give them to your nurse as they will be administered by the nursing staff.    If you have any questions, please call [facility] [phone] option [phone].    A copy of this note was provided to the patient for reference.

## 2024-03-26 NOTE — PERIOP NOTE
Patient verified name and .    Order for consent found in EHR and matches case posting; patient verified.     Type 3 surgery, PAT Joint assessment complete.    Labs per surgeon: CBC,BMP, PT/PTT, HgbA1C; results pending.   Labs per anesthesia protocol: no additional lab work needed.  EKG:Done today- within anesthesia guidelines.     MRSA/MSSA swab collected; pharmacy to review and dose antibiotic as appropriate.     Hospital approved surgical skin cleanser and instructions to return bottle on DOS given per hospital policy.    Patient provided with handouts including Guide to Surgery, Pain Management, Hand Hygiene, Blood Transfusion Education, and [place] [facility] Brochure.    Patient answered medical/surgical history questions at their best of ability. All prior to admission medications documented in [facility]. Original medication prescription bottle not visualized during patient appointment.     Patient instructed to hold all vitamins 3 weeks prior to surgery and NSAIDS 5 days prior to surgery.     Patient teach back successful and patient demonstrates knowledge of instruction.

## 2024-03-26 NOTE — PROGRESS NOTES
and Decreased stance    DME None     Stairs      Ramp     I=Independent, Mod I=Modified Independent, S=Supervision, SBA=Standby Assistance, CGA=Contact Guard Assistance,   Min=Minimal Assistance, Mod=Moderate Assistance, Max=Maximal Assistance, Total=Total Assistance, NT=Not Tested    TREATMENT:   EVALUATION: LOW COMPLEXITY: (Untimed Charge)  The initial evaluation charge encompasses clinical chart review, objective assessment, interpretation of assessment, and skilled monitoring of the patient's response to treatment in order to develop a plan of care.     TREATMENT PLAN:   Effective Dates: 3/26/2024 TO 3/26/2024.    Treatment/Session Assessment:  Patient was instructed in PT- HEP to increase strength and ROM in LEs.  Answered all questions.  Frequency/Duration: Patient to continue to perform home exercise program at least twice per day up until her surgery.    EDUCATION: Education Given To: Patient  Education Provided: Role of Therapy, Plan of Care, Home Exercise Program  Education Method: Verbal, Video  Education Outcome: Verbalized understanding    MEDICAL NECESSITY: Ms. Mccarthy is expected to optimize herlower extremity strength and ROM in preparation for joint replacement surgery.    REASON FOR CONTINUED SERVICES: Achieve baseline assesment of musculoskeletal system, functional mobility and home environment., educate in PT HEP in preparation for surgery, educate in hospital plan of care.    COMPLIANCE WITH PROGRAM/EXERCISE: Will assess as treatment progresses.    TOTAL TREATMENT DURATION:  Time In: 1230  Time Out: 1245  Minutes: 15    Regarding Vandana Mccarthy's therapy, I certify that the treatment plan above will be carried out by a therapist or under their direction.  Thank you for this referral,  Christina Villafuerte, PT

## 2024-03-26 NOTE — PROGRESS NOTES
03/26/24 1130   Treatment   Treatment Type Bedside spirometry   Breath Sounds   Breath Sounds Bilateral Diminished   Oxygen Therapy/Pulse Ox   O2 Therapy Room air   Pulse 69   SpO2 99 %   Pulse Oximeter Device Mode Intermittent   $Pulse Oximeter $Spot check (single)   Bedside Spirometry   FEV-1/Actual (Liters) 1.54 Liters   FEV-1/Predicted (Liters) 73 Liters     Initial respiratory Assessment completed with pt. Pt was interviewed and evaluated in Joint camp prior to surgery.  Patient ID:  Vandana Mccarthy  [id number]  68 y.o.  1955  Surgeon: Dr. Worthington  Date of Surgery: [unfilled]4/19/2024  Procedure: Total Left Knee Arthroplasty  Primary Care Physician: Caron Benson [phone]  Specialists:    Pt taught proper COUGH technique  IS REVIEWED WITH PT AS WELL AS BENEFITS OF USING IS IN SEDENTARY PTS.  DIAPHRAGMATIC BREATHING EXERCISE INSTRUCTIONS GIVEN    History of smoking:   DENIES                 Quit date:         Secondhand smoke:DENIES    Past procedures with Oxygen desaturation or delayed awakening:DENIES     Respiratory history:DENIES SOB                                                                  Respiratory meds:  DENIES    FAMILY PRESENT:             NO     PAST SLEEP STUDY:                 DENIES  HX OF STAILN:                                   DENIES  PT WAS REFERRED FOR HST LAST JOINT CAMP 4/4/2022- PT DID NOT FOLLOW UP  STALIN assessment:     DANGERS OF UNTREATED STALIN EXPLAINED TO PT.                                          SLEEPS ON SIDE       &      BACK         &       STOMACH  PHYSICAL EXAM   Body mass index is 32.39 kg/m².   Vitals:    03/26/24 1130   BP:    Pulse: (P) 69   Resp:    Temp:    SpO2: (P) 99%     Neck circumference:      cm    Loud snoring:                                                 YES            Witnessed apnea or wakening gasping or choking:        DENIES        Awakens with headaches:                                               DENIES  Morning or daytime 
3/6/24  Yes Watson Carreno MD   lisinopril-hydroCHLOROthiazide (PRINZIDE;ZESTORETIC) 20-12.5 MG per tablet Take 1 tablet by mouth daily 3/6/24  Yes Watson Carreno MD   loratadine (CLARITIN) 10 MG tablet Take 1 tablet by mouth as needed 12/2/22  Yes Watson Carreno MD   meloxicam (MOBIC) 7.5 MG tablet Take 1 tablet by mouth as needed 5/19/22  Yes Watson Carreno MD   triamcinolone (KENALOG) 0.1 % cream Apply topically 3 times daily as needed 3/6/24  Yes Watson Carreno MD   Ascorbic Acid (VITAMIN C) 500 MG CAPS Take 500 mg by mouth daily    Watson Carreno MD   Calcium-Vitamin D 600-5 MG-MCG TABS Take 1 tablet by mouth daily    Watson Carreno MD   Ferrous Sulfate (IRON) 325 (65 Fe) MG TABS Take 1 tablet by mouth every morning (before breakfast)    Watson Carreno MD   Multiple Vitamin (MULTIVITAMIN ADULT PO) Take 1 tablet by mouth daily    Watson Carreno MD   acetaminophen (TYLENOL) 500 MG tablet Take 1 tablet by mouth as needed    Watson Carreno MD   alum Hydroxide-Mag Carbonate (GAVISCON EXTRA STRENGTH) 160-105 MG CHEW 1 tablet as needed    Watson Carreno MD   meclizine (ANTIVERT) 25 MG tablet Take 1 tablet by mouth 3 times daily as needed    Watson Carreno MD     Allergies   Allergen Reactions    Nexium [Esomeprazole] Rash    Shellfish-Derived Products Rash          Objective:     Physical Exam:   Patient Vitals for the past 24 hrs:   Temp Pulse Resp BP SpO2   03/26/24 1021 97.3 °F (36.3 °C) 63 16 (!) 144/77 98 %       ECG:    Encounter Date: 03/26/24   EKG 12 Lead   Result Value    Ventricular Rate 60    Atrial Rate 60    P-R Interval 142    QRS Duration 90    Q-T Interval 392    QTc Calculation (Bazett) 392    P Axis 33    R Axis 61    T Axis 9    Diagnosis      !!! Poor data quality, interpretation may be adversely affected  Normal sinus rhythm  Normal ECG    Confirmed by KHOA MANDUJANO (), ELIZABETH BARRETT ([id number]) on 3/26/2024 11:03:57

## 2024-03-27 LAB
MRSA DNA SPEC QL NAA+PROBE: NOT DETECTED
S AUREUS CPE NOSE QL NAA+PROBE: NOT DETECTED

## 2024-04-11 ENCOUNTER — OFFICE VISIT (OUTPATIENT)
Dept: ORTHOPEDIC SURGERY | Age: 69
End: 2024-04-11

## 2024-04-11 DIAGNOSIS — Z01.818 ENCOUNTER FOR PRE-OPERATIVE EXAMINATION: ICD-10-CM

## 2024-04-11 DIAGNOSIS — M17.12 PRIMARY OSTEOARTHRITIS OF LEFT KNEE: Primary | ICD-10-CM

## 2024-04-11 PROCEDURE — PREOPEXAM PRE-OP EXAM: Performed by: ORTHOPAEDIC SURGERY

## 2024-04-11 RX ORDER — TIZANIDINE 2 MG/1
2 TABLET ORAL EVERY 6 HOURS PRN
Qty: 30 TABLET | Refills: 0 | Status: CANCELLED | OUTPATIENT
Start: 2024-04-17

## 2024-04-11 RX ORDER — CELECOXIB 200 MG/1
200 CAPSULE ORAL 2 TIMES DAILY
Qty: 60 CAPSULE | Refills: 0 | Status: CANCELLED | OUTPATIENT
Start: 2024-04-17 | End: 2024-05-17

## 2024-04-11 RX ORDER — PROMETHAZINE HYDROCHLORIDE 12.5 MG/1
12.5 TABLET ORAL 4 TIMES DAILY PRN
Qty: 20 TABLET | Refills: 2 | Status: CANCELLED | OUTPATIENT
Start: 2024-04-17

## 2024-04-11 RX ORDER — ASPIRIN 81 MG/1
81 TABLET ORAL 2 TIMES DAILY
Qty: 70 TABLET | Refills: 0 | Status: CANCELLED | OUTPATIENT
Start: 2024-04-17 | End: 2024-05-22

## 2024-04-11 RX ORDER — OXYCODONE HYDROCHLORIDE 5 MG/1
5-10 TABLET ORAL
Qty: 60 TABLET | Refills: 0 | Status: CANCELLED | OUTPATIENT
Start: 2024-04-17 | End: 2024-04-22

## 2024-04-11 NOTE — PROGRESS NOTES
Name: Vandana Mccarthy  YOB: 1955  Gender: female  MRN: [id number]    CC: Pre-op Exam (Left knee)       HPI: Vandana Mccarthy is a 68 y.o. female who presents with Pre-op Exam (Left knee)  The patient is here today for preop exam prior to left total knee arthroplasty.  She relates to me 2 problems which have arisen that needs to be evaluated prior to left knee replacement.  She is being worked up for a breast mass.  She has had it biopsied and a MRI scan ordered.  She still may require further surgery to exclude breast cancer.  She was also complaining of a headache and presented to the emergency room the other night and was diagnosed with an aneurysm.  She has an appointment with the neurosurgeon today.    History was obtained by patient    ROS/Meds/PSH/PMH/FH/SH: I personally reviewed the patients standard intake form.      Current Outpatient Medications:     Ascorbic Acid (VITAMIN C) 500 MG CAPS, Take 500 mg by mouth daily, Disp: , Rfl:     Calcium-Vitamin D 600-5 MG-MCG TABS, Take 1 tablet by mouth daily, Disp: , Rfl:     vitamin D (ERGOCALCIFEROL) 1.25 MG (25380 UT) CAPS capsule, Take 1 capsule by mouth once a week, Disp: , Rfl:     Ferrous Sulfate (IRON) 325 (65 Fe) MG TABS, Take 1 tablet by mouth every morning (before breakfast), Disp: , Rfl:     Multiple Vitamin (MULTIVITAMIN ADULT PO), Take 1 tablet by mouth daily, Disp: , Rfl:     acetaminophen (TYLENOL) 500 MG tablet, Take 1 tablet by mouth as needed, Disp: , Rfl:     alum Hydroxide-Mag Carbonate (GAVISCON EXTRA STRENGTH) 160-105 MG CHEW, 1 tablet as needed, Disp: , Rfl:     fluticasone (FLONASE) 50 MCG/ACT nasal spray, 2 sprays by Nasal route daily, Disp: , Rfl:     lisinopril-hydroCHLOROthiazide (PRINZIDE;ZESTORETIC) 20-12.5 MG per tablet, Take 1 tablet by mouth daily, Disp: , Rfl:     loratadine (CLARITIN) 10 MG tablet, Take 1 tablet by mouth as needed, Disp: , Rfl:     meclizine (ANTIVERT) 25 MG tablet, Take 1 tablet by mouth 3 times daily as

## 2024-11-18 ENCOUNTER — TELEPHONE (OUTPATIENT)
Dept: ORTHOPEDIC SURGERY | Age: 69
End: 2024-11-18

## 2024-11-18 NOTE — TELEPHONE ENCOUNTER
----- Message from Debbi SARMIENTO sent at 10/2/2024  1:23 PM EDT -----  Brigido Beckwith  wants to schedule her surgery. She wants you to call her.

## 2025-02-24 DIAGNOSIS — M17.12 PRIMARY OSTEOARTHRITIS OF LEFT KNEE: Primary | ICD-10-CM

## 2025-03-28 ENCOUNTER — PREP FOR PROCEDURE (OUTPATIENT)
Dept: ORTHOPEDIC SURGERY | Age: 70
End: 2025-03-28

## 2025-03-28 DIAGNOSIS — M17.12 PRIMARY OSTEOARTHRITIS OF LEFT KNEE: Primary | ICD-10-CM

## 2025-03-28 RX ORDER — SODIUM CHLORIDE 9 MG/ML
INJECTION, SOLUTION INTRAVENOUS PRN
OUTPATIENT
Start: 2025-03-28

## 2025-03-28 RX ORDER — ACETAMINOPHEN 325 MG/1
1000 TABLET ORAL ONCE
OUTPATIENT
Start: 2025-03-28 | End: 2025-03-28

## 2025-03-28 RX ORDER — SODIUM CHLORIDE 0.9 % (FLUSH) 0.9 %
5-40 SYRINGE (ML) INJECTION EVERY 12 HOURS SCHEDULED
OUTPATIENT
Start: 2025-03-28

## 2025-03-28 RX ORDER — SODIUM CHLORIDE 0.9 % (FLUSH) 0.9 %
5-40 SYRINGE (ML) INJECTION PRN
OUTPATIENT
Start: 2025-03-28

## 2025-04-02 ENCOUNTER — HOSPITAL ENCOUNTER (OUTPATIENT)
Dept: SURGERY | Age: 70
Discharge: HOME | End: 2025-04-05
Payer: MEDICARE

## 2025-04-02 VITALS
DIASTOLIC BLOOD PRESSURE: 73 MMHG | HEART RATE: 72 BPM | TEMPERATURE: 97.2 F | RESPIRATION RATE: 16 BRPM | WEIGHT: 203.2 LBS | HEIGHT: 68 IN | OXYGEN SATURATION: 98 % | SYSTOLIC BLOOD PRESSURE: 129 MMHG | BODY MASS INDEX: 30.8 KG/M2

## 2025-04-02 DIAGNOSIS — M17.12 PRIMARY OSTEOARTHRITIS OF LEFT KNEE: ICD-10-CM

## 2025-04-02 LAB
ALBUMIN SERPL-MCNC: 3.6 G/DL (ref 3.2–4.6)
ALBUMIN/GLOB SERPL: 1 (ref 1–1.9)
ALP SERPL-CCNC: 79 U/L (ref 35–104)
ALT SERPL-CCNC: 13 U/L (ref 8–45)
ANION GAP SERPL CALC-SCNC: 10 MMOL/L (ref 7–16)
AST SERPL-CCNC: 17 U/L (ref 15–37)
BASOPHILS # BLD: 0.03 K/UL (ref 0–0.2)
BASOPHILS NFR BLD: 0.5 % (ref 0–2)
BILIRUB SERPL-MCNC: 0.2 MG/DL (ref 0–1.2)
BUN SERPL-MCNC: 12 MG/DL (ref 8–23)
CALCIUM SERPL-MCNC: 9.6 MG/DL (ref 8.8–10.2)
CHLORIDE SERPL-SCNC: 103 MMOL/L (ref 98–107)
CO2 SERPL-SCNC: 25 MMOL/L (ref 20–29)
CREAT SERPL-MCNC: 0.68 MG/DL (ref 0.6–1.1)
DIFFERENTIAL METHOD BLD: ABNORMAL
EKG ATRIAL RATE: 60 BPM
EKG DIAGNOSIS: NORMAL
EKG P AXIS: 61 DEGREES
EKG P-R INTERVAL: 168 MS
EKG Q-T INTERVAL: 414 MS
EKG QRS DURATION: 86 MS
EKG QTC CALCULATION (BAZETT): 414 MS
EKG R AXIS: 62 DEGREES
EKG T AXIS: 17 DEGREES
EKG VENTRICULAR RATE: 60 BPM
EOSINOPHIL # BLD: 0.09 K/UL (ref 0–0.8)
EOSINOPHIL NFR BLD: 1.4 % (ref 0.5–7.8)
ERYTHROCYTE [DISTWIDTH] IN BLOOD BY AUTOMATED COUNT: 13.2 % (ref 11.9–14.6)
EST. AVERAGE GLUCOSE BLD GHB EST-MCNC: 119 MG/DL
GLOBULIN SER CALC-MCNC: 3.6 G/DL (ref 2.3–3.5)
GLUCOSE SERPL-MCNC: 102 MG/DL (ref 70–99)
HBA1C MFR BLD: 5.8 % (ref 0–5.6)
HCT VFR BLD AUTO: 36.4 % (ref 35.8–46.3)
HGB BLD-MCNC: 11.5 G/DL (ref 11.7–15.4)
IMM GRANULOCYTES # BLD AUTO: 0.01 K/UL (ref 0–0.5)
IMM GRANULOCYTES NFR BLD AUTO: 0.2 % (ref 0–5)
INR PPP: 1.1
LYMPHOCYTES # BLD: 2.06 K/UL (ref 0.5–4.6)
LYMPHOCYTES NFR BLD: 32.5 % (ref 13–44)
MCH RBC QN AUTO: 26.6 PG (ref 26.1–32.9)
MCHC RBC AUTO-ENTMCNC: 31.6 G/DL (ref 31.4–35)
MCV RBC AUTO: 84.3 FL (ref 82–102)
MONOCYTES # BLD: 0.38 K/UL (ref 0.1–1.3)
MONOCYTES NFR BLD: 6 % (ref 4–12)
NEUTS SEG # BLD: 3.77 K/UL (ref 1.7–8.2)
NEUTS SEG NFR BLD: 59.4 % (ref 43–78)
NRBC # BLD: 0 K/UL (ref 0–0.2)
PLATELET # BLD AUTO: 235 K/UL (ref 150–450)
PMV BLD AUTO: 10 FL (ref 9.4–12.3)
POTASSIUM SERPL-SCNC: 3.7 MMOL/L (ref 3.5–5.1)
PROT SERPL-MCNC: 7.3 G/DL (ref 6.3–8.2)
PROTHROMBIN TIME: 14.1 SEC (ref 11.3–14.9)
RBC # BLD AUTO: 4.32 M/UL (ref 4.05–5.2)
SODIUM SERPL-SCNC: 138 MMOL/L (ref 136–145)
WBC # BLD AUTO: 6.3 K/UL (ref 4.3–11.1)

## 2025-04-02 PROCEDURE — 87641 MR-STAPH DNA AMP PROBE: CPT

## 2025-04-02 PROCEDURE — 80053 COMPREHEN METABOLIC PANEL: CPT

## 2025-04-02 PROCEDURE — 83036 HEMOGLOBIN GLYCOSYLATED A1C: CPT

## 2025-04-02 PROCEDURE — 85610 PROTHROMBIN TIME: CPT

## 2025-04-02 PROCEDURE — 93005 ELECTROCARDIOGRAM TRACING: CPT | Performed by: STUDENT IN AN ORGANIZED HEALTH CARE EDUCATION/TRAINING PROGRAM

## 2025-04-02 PROCEDURE — 85025 COMPLETE CBC W/AUTO DIFF WBC: CPT

## 2025-04-02 RX ORDER — OMEPRAZOLE 20 MG/1
20 CAPSULE, DELAYED RELEASE ORAL DAILY PRN
COMMUNITY

## 2025-04-02 ASSESSMENT — PAIN SCALES - GENERAL: PAINLEVEL_OUTOF10: 7

## 2025-04-02 ASSESSMENT — PAIN - FUNCTIONAL ASSESSMENT: PAIN_FUNCTIONAL_ASSESSMENT: PREVENTS OR INTERFERES SOME ACTIVE ACTIVITIES AND ADLS

## 2025-04-02 ASSESSMENT — PAIN DESCRIPTION - DESCRIPTORS: DESCRIPTORS: ACHING

## 2025-04-02 ASSESSMENT — PAIN DESCRIPTION - ORIENTATION: ORIENTATION: LEFT

## 2025-04-02 ASSESSMENT — PAIN DESCRIPTION - LOCATION: LOCATION: KNEE

## 2025-04-02 NOTE — PERIOP NOTE
Patient verified name and .    Order for consent was found in EHR and does match case posting; patient verified.     Type 3 surgery, PAT walk in assessment complete.    Labs per surgeon: CBC,CMP, A1C, PT/INR; results pending.   Labs per anesthesia protocol: no additional lab work needed.  EKG:done today- abnormal; will have anesthesia review. EKG tracing from 3/26/24 found in chart for comparison.     MRSA/MSSA swab collected per policy. MD to consult pharmacy to dose Vanc if appropriate.     Hospital approved surgical skin cleanser and instructions to return bottle on DOS given per hospital policy.    Patient provided with handouts including Guide to Surgery, Pain Management, Preventing Surgical Site Infections, and [facility] Brochure.    Patient answered medical/surgical history questions at their best of ability. All prior to admission medications documented in Epic. Original medication prescription bottle was not visualized during patient appointment.     Patient instructed to hold all vitamins 3 weeks prior to surgery and NSAIDS 5 days prior to surgery.     Patient teach back successful and patient demonstrates knowledge of instruction.

## 2025-04-02 NOTE — PERIOP NOTE
PLEASE CONTINUE TAKING ALL PRESCRIPTION MEDICATIONS UP TO THE DAY OF SURGERY UNLESS OTHERWISE DIRECTED BELOW.    DISCONTINUE all vitamins, herbals and supplements 3 weeks prior to surgery. DISCONTINUE Non-Steroidal Anti-Inflammatory (NSAIDS) such as Advil, Ibuprofen, Motrin, Naproxen and Aleve 5 days prior to surgery.       Home Medications to take  the day of surgery    Omeprazole of needed, claritin if needed           Home Medications to Hold- please continue all other medications except these.    Stop meloxicam five days prior to surgery    Stop vitamins and supplements now      Comments   On the day before surgery please take Acetaminophen 1000mg in the morning and then again before bed. You may substitute for Tylenol 650 mg.      Bring Dynahex wash and Incentive Spirometer with you to hospital on the day of surgery.            Please do not bring home medications with you on the day of surgery unless otherwise directed by your nurse.  If you are instructed to bring home medications, please give them to your nurse as they will be administered by the nursing staff.    If you have any questions, please call [facility] [phone] option [phone].    A copy of this note was provided to the patient for reference.

## 2025-04-03 LAB
MRSA DNA SPEC QL NAA+PROBE: NOT DETECTED
S AUREUS CPE NOSE QL NAA+PROBE: NOT DETECTED

## (undated) DEVICE — SYR 50ML LR LCK 1ML GRAD NSAF --

## (undated) DEVICE — BUTTON SWITCH PENCIL BLADE ELECTRODE, HOLSTER: Brand: EDGE

## (undated) DEVICE — FLEXIBLE YANKAUER,MEDIUM TIP, NO VACUUM CONTROL: Brand: ARGYLE

## (undated) DEVICE — REM POLYHESIVE ADULT PATIENT RETURN ELECTRODE: Brand: VALLEYLAB

## (undated) DEVICE — BLADE SAW PAT RMR PILT H 41MM --

## (undated) DEVICE — PRECISION THIN (5.5 X 0.38 X 18.0MM)

## (undated) DEVICE — SUTURE VCRL SZ 1 L27IN ABSRB UD L36MM CP-1 1/2 CIR REV CUT J268H

## (undated) DEVICE — DRAPE XR C ARM 41X74IN LF --

## (undated) DEVICE — SUT ETHBND 2 30IN LR DA GRN --

## (undated) DEVICE — SUTURE ABS ANTIBACT 1-0 CTX 24IN STRATAFIX PDS+ SXPP1A445

## (undated) DEVICE — STERILE PRESSURE PROTECTOR PAD® FOR DE MAYO UNIVERSAL DISTRACTOR® (10/CASE): Brand: DE MAYO UNIVERSAL DISTRACTOR®

## (undated) DEVICE — KIT PROC KNE TRACKING PK/1 -- VIZADISC MAKO

## (undated) DEVICE — SOLUTION IRRIG 3000ML 0.9% SOD CHL FLX CONT 0797208] ICU MEDICAL INC]

## (undated) DEVICE — KIT DRP FOR RIO ROBOTIC ARM ASST SYS

## (undated) DEVICE — SUTURE STRATAFIX SPRL MCRYL + SZ 4-0 L12IN ABSRB UD PS-2 SXMP1B117

## (undated) DEVICE — SUTURE VCRL SZ 2-0 L18IN ABSRB UD CT-1 L36MM 1/2 CIR J839D

## (undated) DEVICE — 450 ML BOTTLE OF 0.05% CHLORHEXIDINE GLUCONATE IN 99.95% STERILE WATER FOR IRRIGATION, USP AND APPLICATOR.: Brand: IRRISEPT ANTIMICROBIAL WOUND LAVAGE

## (undated) DEVICE — DISPOSABLE DRAPE, STERILE, FOR A CDS-3060 5 FOOT TABLE: Brand: PEDIGO PRODUCTS, INC.

## (undated) DEVICE — STERILE HOOK LOCK LATEX FREE ELASTIC BANDAGE 6INX5YD: Brand: HOOK LOCK™

## (undated) DEVICE — CORD RETRCT SIL

## (undated) DEVICE — SOLUTION IV 1000ML 0.9% SOD CHL

## (undated) DEVICE — 3M™ COBAN™ SELF-ADHERENT WRAP, 1586S, STERILE, 6 IN X 5 YD (15 CM X 4,5 M), 12 ROLLS/CASE: Brand: 3M™ COBAN™

## (undated) DEVICE — BLADE SAW LG BNE 90X19X1.27 MM PARL STRYKR NS EZ BLADE DISP

## (undated) DEVICE — GUIDEPIN ORTHOPEDIC NAVIGATION 4X110 MM 2P SCREW STRL

## (undated) DEVICE — CURITY NON-ADHERENT STRIPS: Brand: CURITY

## (undated) DEVICE — POINTED DRILL BIT

## (undated) DEVICE — Device

## (undated) DEVICE — AMD ANTIMICROBIAL BANDAGE ROLL,6 PLY: Brand: KERLIX

## (undated) DEVICE — SUTURE VCRL SZ 3-0 L18IN ABSRB UD PS-2 L19MM 1/2 CIR J497G

## (undated) DEVICE — SOLUTION IV 250ML 0.9% SOD CHL CLR INJ FLX BG CONT PRT CLSR

## (undated) DEVICE — Z DISCONTINUED USE 2744636  DRESSING AQUACEL 14 IN ALG W3.5XL14IN POLYUR FLM CVR W/ HYDRCOLL

## (undated) DEVICE — BLADE SURG SAW S STL NAR OSC W/ SERR EDGE DISP

## (undated) DEVICE — PODIATRY: Brand: MEDLINE INDUSTRIES, INC.

## (undated) DEVICE — 1010 S-DRAPE TOWEL DRAPE 10/BX: Brand: STERI-DRAPE™

## (undated) DEVICE — GUIDEPIN ORTHOPEDIC NAVIGATION 4X140 MM 2P SCREW STRL

## (undated) DEVICE — BIPOLAR SEALER 23-112-1 AQM 6.0: Brand: AQUAMANTYS ®

## (undated) DEVICE — MASTISOL ADHESIVE LIQ 2/3ML

## (undated) DEVICE — STRETCH BANDAGE ROLL: Brand: DERMACEA

## (undated) DEVICE — KIT INT FIX FEM TIB CKPT MAKOPLASTY

## (undated) DEVICE — TOTAL KNEE DR JENNINGS: Brand: MEDLINE INDUSTRIES, INC.

## (undated) DEVICE — SKIN MARKER,REGULAR TIP WITH RULER AND LABELS: Brand: DEVON

## (undated) DEVICE — SUT VCRL + 3-0 27IN X1 VIO --

## (undated) DEVICE — TRAY PREP DRY W/ PREM GLV 2 APPL 6 SPNG 2 UNDPD 1 OVERWRAP

## (undated) DEVICE — STOCKINETTE: Brand: DEROYAL